# Patient Record
Sex: MALE | Race: WHITE | NOT HISPANIC OR LATINO | Employment: OTHER | URBAN - METROPOLITAN AREA
[De-identification: names, ages, dates, MRNs, and addresses within clinical notes are randomized per-mention and may not be internally consistent; named-entity substitution may affect disease eponyms.]

---

## 2017-02-13 ENCOUNTER — PATIENT MESSAGE (OUTPATIENT)
Dept: RHEUMATOLOGY | Facility: CLINIC | Age: 72
End: 2017-02-13

## 2017-02-15 ENCOUNTER — OFFICE VISIT (OUTPATIENT)
Dept: RHEUMATOLOGY | Facility: CLINIC | Age: 72
End: 2017-02-15
Payer: MEDICARE

## 2017-02-15 VITALS
WEIGHT: 195.38 LBS | DIASTOLIC BLOOD PRESSURE: 82 MMHG | HEIGHT: 65 IN | HEART RATE: 64 BPM | BODY MASS INDEX: 32.55 KG/M2 | SYSTOLIC BLOOD PRESSURE: 137 MMHG

## 2017-02-15 DIAGNOSIS — M62.830 SPASM OF LUMBAR PARASPINOUS MUSCLE: Primary | ICD-10-CM

## 2017-02-15 DIAGNOSIS — Z79.52 ON CORTICOSTEROID THERAPY: ICD-10-CM

## 2017-02-15 DIAGNOSIS — M35.3 POLYMYALGIA RHEUMATICA: ICD-10-CM

## 2017-02-15 PROCEDURE — 3079F DIAST BP 80-89 MM HG: CPT | Mod: S$GLB,,, | Performed by: INTERNAL MEDICINE

## 2017-02-15 PROCEDURE — 3075F SYST BP GE 130 - 139MM HG: CPT | Mod: S$GLB,,, | Performed by: INTERNAL MEDICINE

## 2017-02-15 PROCEDURE — 1157F ADVNC CARE PLAN IN RCRD: CPT | Mod: S$GLB,,, | Performed by: INTERNAL MEDICINE

## 2017-02-15 PROCEDURE — 99999 PR PBB SHADOW E&M-EST. PATIENT-LVL III: CPT | Mod: PBBFAC,,, | Performed by: INTERNAL MEDICINE

## 2017-02-15 PROCEDURE — 99213 OFFICE O/P EST LOW 20 MIN: CPT | Mod: S$GLB,,, | Performed by: INTERNAL MEDICINE

## 2017-02-15 PROCEDURE — 1160F RVW MEDS BY RX/DR IN RCRD: CPT | Mod: S$GLB,,, | Performed by: INTERNAL MEDICINE

## 2017-02-15 PROCEDURE — 1159F MED LIST DOCD IN RCRD: CPT | Mod: S$GLB,,, | Performed by: INTERNAL MEDICINE

## 2017-02-15 PROCEDURE — 1125F AMNT PAIN NOTED PAIN PRSNT: CPT | Mod: S$GLB,,, | Performed by: INTERNAL MEDICINE

## 2017-02-15 RX ORDER — ETODOLAC 400 MG/1
400 TABLET, FILM COATED ORAL 2 TIMES DAILY
Qty: 60 TABLET | Refills: 0 | Status: SHIPPED | OUTPATIENT
Start: 2017-02-15 | End: 2017-04-10

## 2017-02-15 RX ORDER — TIZANIDINE HYDROCHLORIDE 4 MG/1
4 CAPSULE, GELATIN COATED ORAL 2 TIMES DAILY PRN
Qty: 30 CAPSULE | Refills: 1 | Status: SHIPPED | OUTPATIENT
Start: 2017-02-15 | End: 2017-04-10

## 2017-02-15 ASSESSMENT — ROUTINE ASSESSMENT OF PATIENT INDEX DATA (RAPID3)
PAIN SCORE: 8.5
FATIGUE SCORE: 8.5
TOTAL RAPID3 SCORE: 6.72
PSYCHOLOGICAL DISTRESS SCORE: 3.3
AM STIFFNESS SCORE: 1, YES
MDHAQ FUNCTION SCORE: .8
PATIENT GLOBAL ASSESSMENT SCORE: 9

## 2017-02-15 NOTE — MR AVS SNAPSHOT
[facility]  [address]  [facility] [address]  Phone: [phone]  Fax: [phone]                  Sharita Gonzalez   2/15/2017 3:00 PM   Office Visit    Description:  Male : 1945   Provider:  Jd Nguyen MD   Department:  [facility]           Reason for Visit     Disease Management           Diagnoses this Visit        Comments    Spasm of lumbar paraspinous muscle    -  Primary            To Do List           Future Appointments        Provider Department Dept Phone    4/10/2017 8:00 AM Jd Nguyen MD [facility] - Rheumatology [phone]      Goals (5 Years of Data)     None       These Medications        Disp Refills Start End    etodolac (LODINE) 400 MG tablet 60 tablet 0 2/15/2017     Take 1 tablet (400 mg total) by mouth 2 (two) times daily. - Oral    Pharmacy: [facility] [address] [place] Ph #: [phone]       tizanidine 4 mg Cap 30 capsule 1 2/15/2017 3/17/2017    Take 4 mg by mouth 2 (two) times daily as needed (muscle pain). - Oral    Pharmacy: [facility] - [place] [address] [facility] Ph #: [phone]         [facility]     [facility] On Call Nurse Care Line -  Assistance  Registered nurses in the [facility] provide clinical advisement, health education, appointment booking, and other advisory services.  Call for this free service at [phone].             Medications           Message regarding Medications     Verify the changes and/or additions to your medication regime listed below are the same as discussed with your clinician today.  If any of these changes or additions are incorrect, please notify your healthcare provider.        START taking these NEW medications        Refills    etodolac (LODINE) 400 MG tablet 0    Sig: Take 1 tablet (400 mg total) by mouth 2 (two) times daily.    Class: Normal    Route: Oral    tizanidine 4 mg  "Cap 1    Sig: Take 4 mg by mouth 2 (two) times daily as needed (muscle pain).    Class: Normal    Route: Oral           Verify that the below list of medications is an accurate representation of the medications you are currently taking.  If none reported, the list may be blank. If incorrect, please contact your healthcare provider. Carry this list with you in case of emergency.           Current Medications     aspirin 81 mg Tab Take 81 mg by mouth. 1 Tablet Oral Every day    multivitamin (THERAGRAN) per tablet Take by mouth. 1 Tablet Oral Every day    predniSONE (DELTASONE) 5 MG tablet Take 3 tablets (15 mg total) by mouth once daily.    trandolapril (MAVIK) 4 MG Tab     verapamil (CALAN-SR) 240 MG CR tablet Take 240 mg by mouth every evening.    etodolac (LODINE) 400 MG tablet Take 1 tablet (400 mg total) by mouth 2 (two) times daily.    tizanidine 4 mg Cap Take 4 mg by mouth 2 (two) times daily as needed (muscle pain).           Clinical Reference Information           Your Vitals Were     BP Pulse Height Weight BMI    137/82 64 5' 5" (1.651 m) 88.6 kg (195 lb 6.4 oz) 32.52 kg/m2      Blood Pressure          Most Recent Value    BP  137/82      Allergies as of 2/15/2017     Adhesive    Latex, Natural Rubber      Immunizations Administered on Date of Encounter - 2/15/2017     None      Instructions    Take anti-inflammatory (etodolac) and anti-spasm (tizanidine) twice daily  Use heat and stretch gently  Call if not better by next week  Consider additional physical therapy       Smoking Cessation     If you would like to quit smoking:   You may be eligible for free services if you are a [place] resident and started smoking cigarettes before September 1, 1988.  Call the Smoking Cessation Trust (SCT) toll free at (490) 248-6515 or ([phone] 149-5588.   Call 1-921-QUIT-NOW if you do not meet the above criteria.            Language Assistance Services     ATTENTION: Language assistance services are available, free of " charge. Please call [phone].      ATENCIÓN: Si habla español, tiene a pavon disposición servicios gratuitos de asistencia lingüística. Llame al [phone].     CHÚ Ý: N?u b?n nói Ti?ng Vi?t, có các d?ch v? h? tr? ngôn ng? mi?n phí dành cho b?n. G?i s? [phone].         [facility] complies with applicable Federal civil rights laws and does not discriminate on the basis of race, color, national origin, age, disability, or sex.

## 2017-02-15 NOTE — PATIENT INSTRUCTIONS
Take anti-inflammatory (etodolac) and anti-spasm (tizanidine) twice daily  Use heat and stretch gently  Call if not better by next week  Consider additional physical therapy

## 2017-02-15 NOTE — PROGRESS NOTES
"Subjective:       Patient ID: Sharita Gonzalez is a 71 y.o. male.    Chief Complaint: Disease Management    HPI   He is seen urgently because of left lower back pain  Has a history of polymyalgia rheumatica controlled with prednisone 50 mg daily.  For the past few weeks she's had progressive pain in the lumbar spine and left lateral flank that has been disabling at times.  He thought he might have a kidney stone but has had no change in urination and nothing his past.  It is somewhat positional when he is up standing walking or twisting.  It does not bother him at night when he lies down to sleep but it's present when he gets up in the morning    Review of Systems    He denies any change in bowel or bladder habits.  No paresthesias  Objective:     Visit Vitals    /82    Pulse 64    Ht 5' 5" (1.651 m)    Wt 88.6 kg (195 lb 6.4 oz)    BMI 32.52 kg/m2        Physical Exam    Localizes pain to the lumbar spine and left flank.  There is no direct tenderness but there is pain when he actively leans or twists his spine.  Straight leg raise test is negative.  Hip range of motion is painless.  Normal muscle tone.  Assessment:       1. Spasm of lumbar paraspinous muscle        Left lumbar and flank pain that does not look like PMR   PMR that is probably stable on 15 mg prednisone  Plan:     1.  Etodolac 400 twice a day plus tizanidine 4 mg twice a day  2.  Heat and stretch  3.  Call me with a progress report.  If no better then physical therapy  4.  Continue prednisone 15 mg daily for PMR  5.  Keep March appointment        "

## 2017-03-06 DIAGNOSIS — M35.3 POLYMYALGIA RHEUMATICA: ICD-10-CM

## 2017-03-06 RX ORDER — PREDNISONE 5 MG/1
15 TABLET ORAL DAILY
Qty: 90 TABLET | Refills: 2 | Status: SHIPPED | OUTPATIENT
Start: 2017-03-06 | End: 2017-04-10 | Stop reason: SDUPTHER

## 2017-03-06 RX ORDER — PREDNISONE 5 MG/1
TABLET ORAL
Qty: 90 TABLET | Refills: 2 | Status: SHIPPED | OUTPATIENT
Start: 2017-03-06 | End: 2017-03-06 | Stop reason: SDUPTHER

## 2017-04-10 ENCOUNTER — HOSPITAL ENCOUNTER (OUTPATIENT)
Dept: RADIOLOGY | Facility: HOSPITAL | Age: 72
Discharge: HOME | End: 2017-04-10
Attending: INTERNAL MEDICINE
Payer: MEDICARE

## 2017-04-10 ENCOUNTER — OFFICE VISIT (OUTPATIENT)
Dept: RHEUMATOLOGY | Facility: CLINIC | Age: 72
End: 2017-04-10
Payer: MEDICARE

## 2017-04-10 VITALS
BODY MASS INDEX: 31.65 KG/M2 | WEIGHT: 190 LBS | DIASTOLIC BLOOD PRESSURE: 80 MMHG | HEIGHT: 65 IN | HEART RATE: 64 BPM | SYSTOLIC BLOOD PRESSURE: 131 MMHG

## 2017-04-10 DIAGNOSIS — M25.512 ACUTE PAIN OF LEFT SHOULDER: ICD-10-CM

## 2017-04-10 DIAGNOSIS — M79.661 RIGHT CALF PAIN: ICD-10-CM

## 2017-04-10 DIAGNOSIS — M35.3 POLYMYALGIA RHEUMATICA: Primary | ICD-10-CM

## 2017-04-10 DIAGNOSIS — Z79.52 ON CORTICOSTEROID THERAPY: ICD-10-CM

## 2017-04-10 PROCEDURE — 73030 X-RAY EXAM OF SHOULDER: CPT | Mod: 26,LT,, | Performed by: RADIOLOGY

## 2017-04-10 PROCEDURE — 99999 PR PBB SHADOW E&M-EST. PATIENT-LVL III: CPT | Mod: PBBFAC,,, | Performed by: INTERNAL MEDICINE

## 2017-04-10 PROCEDURE — 20610 DRAIN/INJ JOINT/BURSA W/O US: CPT | Mod: LT,S$GLB,, | Performed by: INTERNAL MEDICINE

## 2017-04-10 PROCEDURE — 3075F SYST BP GE 130 - 139MM HG: CPT | Mod: S$GLB,,, | Performed by: INTERNAL MEDICINE

## 2017-04-10 PROCEDURE — 73030 X-RAY EXAM OF SHOULDER: CPT | Mod: TC,LT

## 2017-04-10 PROCEDURE — 3079F DIAST BP 80-89 MM HG: CPT | Mod: S$GLB,,, | Performed by: INTERNAL MEDICINE

## 2017-04-10 PROCEDURE — 99213 OFFICE O/P EST LOW 20 MIN: CPT | Mod: 25,S$GLB,, | Performed by: INTERNAL MEDICINE

## 2017-04-10 PROCEDURE — 1160F RVW MEDS BY RX/DR IN RCRD: CPT | Mod: S$GLB,,, | Performed by: INTERNAL MEDICINE

## 2017-04-10 PROCEDURE — 1159F MED LIST DOCD IN RCRD: CPT | Mod: S$GLB,,, | Performed by: INTERNAL MEDICINE

## 2017-04-10 PROCEDURE — 1125F AMNT PAIN NOTED PAIN PRSNT: CPT | Mod: S$GLB,,, | Performed by: INTERNAL MEDICINE

## 2017-04-10 PROCEDURE — 1157F ADVNC CARE PLAN IN RCRD: CPT | Mod: S$GLB,,, | Performed by: INTERNAL MEDICINE

## 2017-04-10 RX ORDER — PREDNISONE 1 MG/1
4 TABLET ORAL DAILY
Qty: 120 TABLET | Refills: 2 | Status: SHIPPED | OUTPATIENT
Start: 2017-04-10 | End: 2017-05-10

## 2017-04-10 RX ORDER — TRIAMCINOLONE ACETONIDE 40 MG/ML
40 INJECTION, SUSPENSION INTRA-ARTICULAR; INTRAMUSCULAR
Status: DISCONTINUED | OUTPATIENT
Start: 2017-04-10 | End: 2017-04-10 | Stop reason: HOSPADM

## 2017-04-10 RX ORDER — PREDNISONE 5 MG/1
10 TABLET ORAL DAILY
Qty: 90 TABLET | Refills: 2
Start: 2017-04-10 | End: 2017-10-06

## 2017-04-10 RX ADMIN — TRIAMCINOLONE ACETONIDE 40 MG: 40 INJECTION, SUSPENSION INTRA-ARTICULAR; INTRAMUSCULAR at 10:04

## 2017-04-10 ASSESSMENT — ROUTINE ASSESSMENT OF PATIENT INDEX DATA (RAPID3)
PAIN SCORE: 2.5
FATIGUE SCORE: 2
MDHAQ FUNCTION SCORE: .3
PATIENT GLOBAL ASSESSMENT SCORE: 2
TOTAL RAPID3 SCORE: 1.83

## 2017-04-10 NOTE — PROCEDURES
Large Joint Aspiration/Injection  Date/Time: 4/10/2017 10:32 AM  Performed by: ALICIA DUBOSE  Authorized by: ALICIA DUBSOE     Consent Done?:  Yes (Verbal)  Indications:  Pain    Location:  Shoulder  Site:  L subacromial bursa  Prep: Patient was prepped and draped in usual sterile fashion    Ultrasonic Guidance for needle placement: No  Needle size:  22 G  Approach:  Lateral  Medications:  40 mg triamcinolone acetonide 40 mg/mL  Aspirate amount (ml):  0  Patient tolerance:  Patient tolerated the procedure well with no immediate complications

## 2017-04-10 NOTE — MR AVS SNAPSHOT
[facility]  [address]  [facility] [address]  Phone: [phone]  Fax: [phone]                  Sharita Gonzalez   4/10/2017 8:00 AM   Office Visit    Description:  Male : 1945   Provider:  Jd Nguyen MD   Department:  [facility]           Reason for Visit     Follow-up           Diagnoses this Visit        Comments    Polymyalgia rheumatica    -  Primary     Acute pain of left shoulder         On corticosteroid therapy         Right calf pain                To Do List           Future Appointments        Provider Department Dept Phone    4/10/2017  9:30 AM [facility]TH[facility] [address] [facility] [phone]      Goals (5 Years of Data)     None       These Medications        Disp Refills Start End    predniSONE (DELTASONE) 1 MG tablet 120 tablet 2 4/10/2017 5/10/2017    Take 4 tablets (4 mg total) by mouth once daily. - Oral    Pharmacy: [facility] - [place] - [address] [facility] Ph #: [phone]       predniSONE (DELTASONE) 5 MG tablet 90 tablet 2 4/10/2017     Take 2 tablets (10 mg total) by mouth once daily. - Oral    Pharmacy: [facility] - [place] Ph #: [phone]         [facility]     [facility] Nurse Care Line - 24/ Assistance  Unless otherwise directed by your provider, please contact [facility], our nurse care line that is available for / assistance.     Registered nurses in the [facility] provide: appointment scheduling, clinical advisement, health education, and other advisory services.  Call: [phone] (toll free)               Medications           Message regarding Medications     Verify the changes and/or additions to your medication regime listed below are the same as discussed with your clinician today.  If any of these changes or additions are incorrect, please notify your healthcare provider.       "  START taking these NEW medications        Refills    predniSONE (DELTASONE) 1 MG tablet 2    Sig: Take 4 tablets (4 mg total) by mouth once daily.    Class: Normal    Route: Oral      CHANGE how you are taking these medications     Start Taking Instead of    predniSONE (DELTASONE) 5 MG tablet predniSONE (DELTASONE) 5 MG tablet    Dosage:  Take 2 tablets (10 mg total) by mouth once daily. Dosage:  Take 3 tablets (15 mg total) by mouth once daily.    Reason for Change:  Reorder       STOP taking these medications     etodolac (LODINE) 400 MG tablet Take 1 tablet (400 mg total) by mouth 2 (two) times daily.    tizanidine 4 mg Cap Take 4 mg by mouth 2 (two) times daily as needed (muscle pain).           Verify that the below list of medications is an accurate representation of the medications you are currently taking.  If none reported, the list may be blank. If incorrect, please contact your healthcare provider. Carry this list with you in case of emergency.           Current Medications     aspirin 81 mg Tab Take 81 mg by mouth. 1 Tablet Oral Every day    multivitamin (THERAGRAN) per tablet Take by mouth. 1 Tablet Oral Every day    predniSONE (DELTASONE) 5 MG tablet Take 2 tablets (10 mg total) by mouth once daily.    trandolapril (MAVIK) 4 MG Tab     verapamil (CALAN-SR) 240 MG CR tablet Take 240 mg by mouth every evening.    predniSONE (DELTASONE) 1 MG tablet Take 4 tablets (4 mg total) by mouth once daily.           Clinical Reference Information           Your Vitals Were     BP Pulse Height Weight BMI    131/80 (BP Location: Left arm, Patient Position: Sitting, BP Method: Automatic) 64 5' 5" (1.651 m) 86.2 kg (190 lb) 31.62 kg/m2      Blood Pressure          Most Recent Value    BP  131/80      Allergies as of 4/10/2017     Adhesive    Latex, Natural Rubber      Immunizations Administered on Date of Encounter - 4/10/2017     None      Orders Placed During Today's Visit     Future Labs/Procedures Expected by " Expires    X-Ray Shoulder 2 or More Views Left  4/10/2017 4/10/2018    Recurring Lab Work Interval Expires    C-reactive protein  Every 12 Weeks until 4/11/2019 4/11/2019    Sedimentation rate, manual  Every 12 Weeks until 6/9/2018 6/9/2018      Instructions    Lab in June and August and return in September       Smoking Cessation     If you would like to quit smoking:   You may be eligible for free services if you are a [place] resident and started smoking cigarettes before September 1, 1988.  Call the Smoking Cessation Trust ([facility]) toll free at (405) 740-7657 or (573) 962-5224.   Call 4-889-QUIT-NOW if you do not meet the above criteria.   Contact us via email: [email or web address]   View our website for more information: [email or web address]        Language Assistance Services     ATTENTION: Language assistance services are available, free of charge. Please call [phone].      ATENCIÓN: Si habla español, tiene a pavon disposición servicios gratuitos de asistencia lingüística. Llame al [phone].     CHÚ Ý: N?u b?n nói Ti?ng Vi?t, có các d?ch v? h? tr? ngôn ng? mi?n phí dành cho b?n. G?i s? [phone].         [facility] complies with applicable Federal civil rights laws and does not discriminate on the basis of race, color, national origin, age, disability, or sex.

## 2017-04-10 NOTE — PROGRESS NOTES
"Subjective:       Patient ID: Sharita Gonzalez is a 71 y.o. male.    Chief Complaint: Follow-up    HPI     Follow-up polymyalgia rheumatica  History of PMR 2012 that went into remission. Symptoms reoccurred 2015 and pred restarted  Currently Pred 15 mg/d    L shoulder pain gamaliel. Elevating arm; awoke with pain one morning about 3 weeks ago; no antecedent trauma    R calf pain; swollen for 2 weeks; no long bus trips or plane rides; "feels like a kimberly horse  Usually walks a lot  Fishes a lot    Notes a lot of swelling since on prednisone    He notes that his blood pressure is doing well   Had lab at [facility] and all was well  Had screening colonoscopy    No giant cell arteritis symptoms including headache, jaw pain, or diplopia or visual disturbance    Review of Systems   Constitutional: Negative for fatigue, fever and unexpected weight change.   HENT: Negative for mouth sores and trouble swallowing.    Eyes: Negative for redness.   Respiratory: Negative for cough and shortness of breath.    Cardiovascular: Negative for chest pain.   Gastrointestinal: Negative for abdominal pain, constipation and diarrhea.   Genitourinary: Negative for dysuria and genital sores.   Skin: Negative for rash.   Neurological: Negative for headaches.   Hematological: Does not bruise/bleed easily.   Psychiatric/Behavioral: Negative for sleep disturbance.         Objective:     /80 (BP Location: Left arm, Patient Position: Sitting, BP Method: Automatic)  Pulse 64  Ht 5' 5" (1.651 m)  Wt 86.2 kg (190 lb)  BMI 31.62 kg/m2     Physical Exam      UE nl except L shoulder; sl decreased ER and some pain on resisted abduction with supraspinatus sign  LE normal with sl L calf tenderness but no cord, no swelling, no pain on walking on heels or toes       Assessment:       1. Acute pain of left shoulder        PMR controlled ; needs to taper prednisone  Weight gain and edema on prednisone  L shoulder pain c/w supraspinatus tendinitis  R calf " tenderness of uncertain significance  No symptoms of giant cell arteritis  Plan:     1. Xray L shoulder and return   2.  prednisone taper by 1 mg every 2-4 weeks  3. Repeat esr/crp  and Aug and RTC me Sept      later:  X-ray normal with minimal degenerative changes  Impression left shoulder supraspinatus tendinitis  Recommendation: Inject subacromial bursa with 40 mg of Kenalog  Rapid3 Question Responses and Scores 2017   Dress yourself, including tying shoelaces and doing buttons? With some difficulty   Get in and out of bed? Without any difficulty   Lift a full cup or glass to your mouth? Without any difficulty   Walk outdoors on flat ground? Without any difficulty   Wash and dry your entire body? Without any difficulty   Bend down to  clothing from the floor? With some difficulty   Turn regular faucets on and off? Without any difficulty   Get in and out of a car, bus, train, or airplane? Without any difficulty   Walk two miles or three kilometers, if you wish? With some difficulty   Participate in recreational activities and sports Without any difficulty   Get a good nights sleep? With some difficulty   Deal with feelings of anxiety or being nervous? Without any difficulty   Deal with feelings of depression or feeling blue? Without any difficulty   How much pain have you had because of your condition over the past week?  2.5   When you awakened in the morning OVER THE LAST WEEK, did you feel stiff? No   If Yes, please indicate the number of hours until you are as limber as you will be for the day. 1   How much of a problem has UNUSUAL fatigue or tiredness been for you OVER THE PAST WEEK? 2.0   Considering all the ways in which illness and health conditions may effect you at this time, please indication below how you are doin.0   MHAQ Score 0.3   Rapid 3 Functional Status (FN) 1   RAPID 3 Pain Score 5   RAPID 3 Global Health Score 4   Rapid 3 Total Score 6 (Low Severity)   Rapid 3 Final Score  2       Answers for HPI/ROS submitted by the patient on 4/8/2017   swollen glands: No  heartburn: No  tingling: No

## 2017-06-08 ENCOUNTER — LAB VISIT (OUTPATIENT)
Dept: LAB | Facility: HOSPITAL | Age: 72
End: 2017-06-08
Attending: INTERNAL MEDICINE
Payer: MEDICARE

## 2017-06-08 DIAGNOSIS — M35.3 POLYMYALGIA RHEUMATICA: ICD-10-CM

## 2017-06-08 LAB
CRP SERPL-MCNC: 2 MG/L
ERYTHROCYTE [SEDIMENTATION RATE] IN BLOOD BY WESTERGREN METHOD: 16 MM/HR

## 2017-06-08 PROCEDURE — 86140 C-REACTIVE PROTEIN: CPT

## 2017-06-08 PROCEDURE — 85651 RBC SED RATE NONAUTOMATED: CPT

## 2017-06-08 PROCEDURE — 36415 COLL VENOUS BLD VENIPUNCTURE: CPT | Mod: PO

## 2017-06-26 ENCOUNTER — HOSPITAL ENCOUNTER (OUTPATIENT)
Dept: RADIOLOGY | Facility: OTHER | Age: 72
Discharge: HOME | End: 2017-06-26
Attending: ORTHOPAEDIC SURGERY
Payer: MEDICARE

## 2017-06-26 ENCOUNTER — OFFICE VISIT (OUTPATIENT)
Dept: ORTHOPEDICS | Facility: CLINIC | Age: 72
End: 2017-06-26
Payer: MEDICARE

## 2017-06-26 VITALS
RESPIRATION RATE: 18 BRPM | HEART RATE: 72 BPM | SYSTOLIC BLOOD PRESSURE: 112 MMHG | DIASTOLIC BLOOD PRESSURE: 59 MMHG | HEIGHT: 65 IN | BODY MASS INDEX: 31.65 KG/M2 | WEIGHT: 190 LBS

## 2017-06-26 DIAGNOSIS — S46.111A STRAIN OF MUSCLE, FASCIA AND TENDON OF LONG HEAD OF BICEPS, RIGHT ARM, INITIAL ENCOUNTER: ICD-10-CM

## 2017-06-26 DIAGNOSIS — S49.91XA: ICD-10-CM

## 2017-06-26 DIAGNOSIS — S46.111A STRAIN OF MUSCLE, FASCIA AND TENDON OF LONG HEAD OF BICEPS, RIGHT ARM, INITIAL ENCOUNTER: Primary | ICD-10-CM

## 2017-06-26 PROCEDURE — 1159F MED LIST DOCD IN RCRD: CPT | Mod: S$GLB,,, | Performed by: PHYSICIAN ASSISTANT

## 2017-06-26 PROCEDURE — 73080 X-RAY EXAM OF ELBOW: CPT | Mod: 26,RT,, | Performed by: RADIOLOGY

## 2017-06-26 PROCEDURE — 99999 PR PBB SHADOW E&M-EST. PATIENT-LVL IV: CPT | Mod: PBBFAC,,, | Performed by: PHYSICIAN ASSISTANT

## 2017-06-26 PROCEDURE — 73060 X-RAY EXAM OF HUMERUS: CPT | Mod: 26,RT,, | Performed by: RADIOLOGY

## 2017-06-26 PROCEDURE — 73060 X-RAY EXAM OF HUMERUS: CPT | Mod: TC,RT

## 2017-06-26 PROCEDURE — 1126F AMNT PAIN NOTED NONE PRSNT: CPT | Mod: S$GLB,,, | Performed by: PHYSICIAN ASSISTANT

## 2017-06-26 PROCEDURE — 73080 X-RAY EXAM OF ELBOW: CPT | Mod: TC,RT

## 2017-06-26 PROCEDURE — 73030 X-RAY EXAM OF SHOULDER: CPT | Mod: 26,RT,, | Performed by: RADIOLOGY

## 2017-06-26 PROCEDURE — 99214 OFFICE O/P EST MOD 30 MIN: CPT | Mod: S$GLB,,, | Performed by: PHYSICIAN ASSISTANT

## 2017-06-26 PROCEDURE — 73030 X-RAY EXAM OF SHOULDER: CPT | Mod: TC,RT

## 2017-06-26 NOTE — PROGRESS NOTES
"Subjective:      Patient ID: Sharita Gonzalez is a 71 y.o. male.    Chief Complaint: Pain of the Left Shoulder      HPI  Sharita Gonzalez is a right hand dominant 71 y.o. male presenting today for evaluation of right upper arm pain.  There was a history of trauma- 6 weeks ago he was carrying a cooler in his right hand, lost is footing and "felt a lurch" of the right arm.  He says that the injury was similar to his injury 5 years ago when he tore his biceps tendon.  He has since been experiencing "discomfort and weakness" when he performs repetitive activities, such as fishing.  He says that some days he even has problems holding a coffee cup in the right hand due to the weakness.  He is unable to fish for more than a few hours due to arm fatigue/weakness and the discomfort.  He says that the discomfort "goes from the shoulder to the elbow."  He currently has no pain (0/10), but on occasion the discomfort ranges from mild to moderate.  It is alleviated by rest.  He is status post right proximal biceps tendon tenodesis, right shoulder mass excision, and PRP injection by Dr. Simpson in 2012.  He is also being treated for his left shoulder pain in Rheumatology, he had a kenalog injection in 4/2017.      Review of patient's allergies indicates:   Allergen Reactions    Adhesive Rash    Latex, natural rubber Rash         Current Outpatient Prescriptions   Medication Sig Dispense Refill    aspirin 81 mg Tab Take 81 mg by mouth. 1 Tablet Oral Every day      multivitamin (THERAGRAN) per tablet Take by mouth. 1 Tablet Oral Every day      predniSONE (DELTASONE) 5 MG tablet Take 2 tablets (10 mg total) by mouth once daily. 90 tablet 2    trandolapril (MAVIK) 4 MG Tab       verapamil (CALAN-SR) 240 MG CR tablet Take 240 mg by mouth every evening.       No current facility-administered medications for this visit.        Past Medical History:   Diagnosis Date    Hypertension     Polymyalgia     Prostate cancer        Past " "Surgical History:   Procedure Laterality Date    BRAIN SURGERY  age 27    subarrachnoid brain hemorhage repair    CATARACT EXTRACTION      FINGER SURGERY  9-17-13    left TFR    HEMORRHOID SURGERY      SHOULDER SURGERY           Review of Systems:  Review of Systems   Constitution: Negative for chills and fever.   HENT: Negative for headaches.    Skin: Negative for rash and suspicious lesions.   Musculoskeletal:        See HPI   Neurological: Negative for dizziness, light-headedness, numbness and paresthesias.   Psychiatric/Behavioral: Negative for depression. The patient is not nervous/anxious.          OBJECTIVE:     PHYSICAL EXAM:  Height: 5' 5" (165.1 cm) Weight: 86.2 kg (190 lb)     Vitals:    06/26/17 1329   BP: (!) 112/59   Pulse: 72   Resp: 18     General    Vitals reviewed.  Constitutional: He is oriented to person, place, and time. He appears well-developed and well-nourished.   HENT:   Head: Normocephalic and atraumatic.   Neck: Normal range of motion.   Cardiovascular: Normal rate.    Pulmonary/Chest: Effort normal. No respiratory distress.   Neurological: He is alert and oriented to person, place, and time.   Psychiatric: He has a normal mood and affect. His behavior is normal. Judgment and thought content normal.           Musculoskeletal: Mild edema of medial aspect of distal right upper arm. Scars noted on the right upper arm and shoulder. No ecchymosis. Negative prince's sign, negative reverse prince's sign.  Normal Hook's test. Tender to palpation over the biceps; tenderness noted both proximally and distally, but more significant proximally. Slight decrease in strength of right upper arm with flexion, supination, pronation against resistance. Full symmetrical ROM of shoulders, elbows, wrists, fingers.      RADIOGRAPHS:  Right Elbow X-Ray, 6/26/17  Findings: AP, lateral and oblique radiographs of the right elbow demonstrate a tiny enthesophyte at the lateral epicondyles.  Otherwise, the " osseous structures, soft tissues and joint spaces are normal.   Impression    Small lateral epicondyle enthesophyte otherwise normal exam.     Right Humerus X-Ray, 6/26/17  Findings: AP and lateral radiographs of the right humerus as well as Y-view, internal and external rotation radiographs of the right shoulder demonstrate a lucent lesion in the surgical neck of the right humerus which corresponds to previous postsurgical change as evidenced on the prior MRI.  Otherwise, osseous structures, soft tissues and joint spaces are significant for osteophyte formation at the acromioclavicular joint.  The visualized portions of the right chest are normal.   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  Otherwise normal evaluation of the right shoulder and humerus.     Right Shoulder X-Ray, 6/26/17  Findings: AP and lateral radiographs of the right humerus as well as Y-view, internal and external rotation radiographs of the right shoulder demonstrate a lucent lesion in the surgical neck of the right humerus which corresponds to previous postsurgical change as evidenced on the prior MRI.  Otherwise, osseous structures, soft tissues and joint spaces are significant for osteophyte formation at the acromioclavicular joint.  The visualized portions of the right chest are normal.   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  Otherwise normal evaluation of the right shoulder and humerus.       Comments: I have personally reviewed the imaging and I agree with the above radiologist's report.    ASSESSMENT/PLAN:   Sharita was seen today for pain.    Diagnoses and all orders for this visit:    Strain of muscle, fascia and tendon of long head of biceps, right arm, initial encounter  -     MRI Upper Extremity Joint Without Contrast Right; Future  -     Basic metabolic panel; Future    Injury of arm, right, initial encounter           - We  talked at length about the anatomy and pathophysiology of   Encounter Diagnoses   Name Primary?    Strain of muscle, fascia and tendon of long head of biceps, right arm, initial encounter Yes    Injury of arm, right, initial encounter        - Right shoulder, humerus, and elbow X-Ray  - Right upper extremity MRI   - BMP for MRI  - Therapy was discussed - will wait for MRI results  - Recommended Medications: NSAIDs for pain/discomfort  - Follow up after MRI.

## 2017-07-07 ENCOUNTER — TELEPHONE (OUTPATIENT)
Dept: ORTHOPEDICS | Facility: CLINIC | Age: 72
End: 2017-07-07

## 2017-07-07 NOTE — TELEPHONE ENCOUNTER
----- Message from Enrique Flores sent at 7/7/2017 11:02 AM CDT -----  Contact: Ellen with [facility]  _ 1st Request   _ 2nd Request   _ 3rd Request     Who: POONAM SHARPE [[id number]]    Why: Ellen is requesting a call back in reference to the orders for MRI    What Number to Call Back: [phone]    When to Expect a call back: (Before the end of the day)   -- if call after 3:00 call back will be tomorrow.

## 2017-07-11 ENCOUNTER — HOSPITAL ENCOUNTER (OUTPATIENT)
Dept: RADIOLOGY | Facility: HOSPITAL | Age: 72
Discharge: HOME | End: 2017-07-11
Attending: ORTHOPAEDIC SURGERY
Payer: MEDICARE

## 2017-07-11 DIAGNOSIS — S46.111A STRAIN OF MUSCLE, FASCIA AND TENDON OF LONG HEAD OF BICEPS, RIGHT ARM, INITIAL ENCOUNTER: ICD-10-CM

## 2017-07-11 PROCEDURE — 73218 MRI UPPER EXTREMITY W/O DYE: CPT | Mod: 26,RT,, | Performed by: RADIOLOGY

## 2017-07-11 PROCEDURE — 73218 MRI UPPER EXTREMITY W/O DYE: CPT | Mod: TC,RT

## 2017-07-13 ENCOUNTER — PATIENT MESSAGE (OUTPATIENT)
Dept: ORTHOPEDICS | Facility: CLINIC | Age: 72
End: 2017-07-13

## 2017-07-19 ENCOUNTER — TELEPHONE (OUTPATIENT)
Dept: ORTHOPEDICS | Facility: CLINIC | Age: 72
End: 2017-07-19

## 2017-07-19 NOTE — TELEPHONE ENCOUNTER
----- Message from CARMELITA Hendricks sent at 7/19/2017  1:20 PM CDT -----  Regarding: Follow up appt  I noticed today that he wasn't able to make his appt with Dr. Cutler to discuss his MRI results.  I had purposefully put him on Dr. Cutler's schedule to discuss the results and his pain. The phone room scheduled him to see me about his shoulder (which I assume is the same issue not a new one).  He needs to be on Dr. Cutler's schedule to discuss his MRI and the pain in his upper arm/shoulder, the appointment with me alone would be a wasted appointment.  Please call him to reschedule.    Thank you,  Laila

## 2017-07-25 ENCOUNTER — OFFICE VISIT (OUTPATIENT)
Dept: ORTHOPEDICS | Facility: CLINIC | Age: 72
End: 2017-07-25
Payer: MEDICARE

## 2017-07-25 VITALS
RESPIRATION RATE: 18 BRPM | HEIGHT: 65 IN | HEART RATE: 65 BPM | SYSTOLIC BLOOD PRESSURE: 116 MMHG | BODY MASS INDEX: 31.65 KG/M2 | DIASTOLIC BLOOD PRESSURE: 73 MMHG | WEIGHT: 190 LBS

## 2017-07-25 DIAGNOSIS — S46.111A STRAIN OF MUSCLE, FASCIA AND TENDON OF LONG HEAD OF BICEPS, RIGHT ARM, INITIAL ENCOUNTER: Primary | ICD-10-CM

## 2017-07-25 PROCEDURE — 1126F AMNT PAIN NOTED NONE PRSNT: CPT | Mod: S$GLB,,, | Performed by: ORTHOPAEDIC SURGERY

## 2017-07-25 PROCEDURE — 99212 OFFICE O/P EST SF 10 MIN: CPT | Mod: S$GLB,,, | Performed by: ORTHOPAEDIC SURGERY

## 2017-07-25 PROCEDURE — 1159F MED LIST DOCD IN RCRD: CPT | Mod: S$GLB,,, | Performed by: ORTHOPAEDIC SURGERY

## 2017-07-25 PROCEDURE — 99999 PR PBB SHADOW E&M-EST. PATIENT-LVL III: CPT | Mod: PBBFAC,,, | Performed by: ORTHOPAEDIC SURGERY

## 2017-07-25 PROCEDURE — 3008F BODY MASS INDEX DOCD: CPT | Mod: S$GLB,,, | Performed by: ORTHOPAEDIC SURGERY

## 2017-07-25 NOTE — PROGRESS NOTES
I have personally taken the history and examined the patient. I agree with the Hand Surgery PA's note. The plan will be PT. MRI reviewed with pt. Pt TTP over biceps muscle belly but only mild pain. Pt will start PT fro biceps strain

## 2017-07-25 NOTE — PROGRESS NOTES
"Subjective:      Patient ID: Sharita Gonzalez is a 71 y.o. male.    Chief Complaint: Pain of the Right Shoulder      HPI  Sharita Gonzalez is a right hand dominant 71 y.o. male presenting today for follow up of right upper arm pain and review of his recent MRI.  There was a history of trauma- 10 weeks ago he was carrying a cooler in his right hand, lost is footing and "felt a lurch" of the right arm- patient reports the injury was similar to his injury 5 years ago when he tore his biceps tendon.  He continues to have pain after 2-3 hours of fishing.  He says that he has no pain today due to resting that arm for the past 4 days.    He is status post right proximal biceps tendon tenodesis, right shoulder mass excision, and PRP injection by Dr. Simpson in 2012.  He is also being treated for his left shoulder pain in Rheumatology, he had a kenalog injection in 4/2017.  He is also on oral Prednisone for polymyalgia for the past 1-1.5 years, being followed by Dr. Nguyen.      Review of patient's allergies indicates:   Allergen Reactions    Adhesive Rash    Latex, natural rubber Rash         Current Outpatient Prescriptions   Medication Sig Dispense Refill    aspirin 81 mg Tab Take 81 mg by mouth. 1 Tablet Oral Every day      multivitamin (THERAGRAN) per tablet Take by mouth. 1 Tablet Oral Every day      predniSONE (DELTASONE) 5 MG tablet Take 2 tablets (10 mg total) by mouth once daily. 90 tablet 2    trandolapril (MAVIK) 4 MG Tab       verapamil (CALAN-SR) 240 MG CR tablet Take 240 mg by mouth every evening.       No current facility-administered medications for this visit.        Past Medical History:   Diagnosis Date    Hypertension     Polymyalgia     Prostate cancer        Past Surgical History:   Procedure Laterality Date    BRAIN SURGERY  age 27    subarrachnoid brain hemorhage repair    CATARACT EXTRACTION      FINGER SURGERY  9-17-13    left TFR    HEMORRHOID SURGERY      SHOULDER SURGERY   " "        Review of Systems:  Review of Systems   Constitution: Negative for chills and fever.   HENT: Negative for headaches.    Skin: Negative for rash and suspicious lesions.   Musculoskeletal:        See HPI   Neurological: Negative for dizziness, light-headedness, numbness and paresthesias.   Psychiatric/Behavioral: Negative for depression. The patient is not nervous/anxious.          OBJECTIVE:     PHYSICAL EXAM:  Height: 5' 5" (165.1 cm) Weight: 86.2 kg (190 lb)     Vitals:    07/25/17 1322   BP: 116/73   Pulse: 65   Resp: 18     General    Vitals reviewed.  Constitutional: He is oriented to person, place, and time. He appears well-developed and well-nourished.   HENT:   Head: Normocephalic and atraumatic.   Neck: Normal range of motion.   Cardiovascular: Normal rate.    Pulmonary/Chest: Effort normal. No respiratory distress.   Neurological: He is alert and oriented to person, place, and time.   Psychiatric: He has a normal mood and affect. His behavior is normal. Judgment and thought content normal.           Musculoskeletal: Mild edema of medial aspect of distal right upper arm. Scars noted on the right upper arm and shoulder. No ecchymosis.  Mildly tender to palpation over the biceps.. Slight decrease in strength of right upper arm with flexion, supination, pronation against resistance. Full symmetrical ROM of shoulders, elbows, wrists, fingers.  Negative prince's sign, negative reverse prince's sign.  Normal Hook's test.      RADIOGRAPHS:  Right Upper Extremity MRI, 7/11/17:  Findings: Status post biceps tenodesis.  Proximal biceps tendon is not well evaluated on large field-of-view examination.  Biceps muscle is unremarkable.  No evidence for strain or myotendinous injury.  The distal biceps tendon is intact to the level of the elbow joint.  Insertional tendon is not evaluated.  No fluid collections or masses.  Neurovascular structures are unremarkable.  Bone marrow signal is maintained.   Impression "    Status post biceps tenodesis.  No evidence for biceps muscle injury.  Distal insertion not visualized on MRI of humerus.         Right Elbow X-Ray, 6/26/17  Findings: AP, lateral and oblique radiographs of the right elbow demonstrate a tiny enthesophyte at the lateral epicondyles.  Otherwise, the osseous structures, soft tissues and joint spaces are normal.   Impression    Small lateral epicondyle enthesophyte otherwise normal exam.     Right Humerus X-Ray, 6/26/17  Findings: AP and lateral radiographs of the right humerus as well as Y-view, internal and external rotation radiographs of the right shoulder demonstrate a lucent lesion in the surgical neck of the right humerus which corresponds to previous postsurgical change as evidenced on the prior MRI.  Otherwise, osseous structures, soft tissues and joint spaces are significant for osteophyte formation at the acromioclavicular joint.  The visualized portions of the right chest are normal.   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  Otherwise normal evaluation of the right shoulder and humerus.     Right Shoulder X-Ray, 6/26/17  Findings: AP and lateral radiographs of the right humerus as well as Y-view, internal and external rotation radiographs of the right shoulder demonstrate a lucent lesion in the surgical neck of the right humerus which corresponds to previous postsurgical change as evidenced on the prior MRI.  Otherwise, osseous structures, soft tissues and joint spaces are significant for osteophyte formation at the acromioclavicular joint.  The visualized portions of the right chest are normal.   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  Otherwise normal evaluation of the right shoulder and humerus.           ASSESSMENT/PLAN:   Sharita was seen today for pain.    Diagnoses and all orders for this visit:    Strain of muscle, fascia and tendon of long head  of biceps, right arm, initial encounter  -     Ambulatory Referral to Physical/Occupational Therapy           - We talked at length about the anatomy and pathophysiology of   Encounter Diagnosis   Name Primary?    Strain of muscle, fascia and tendon of long head of biceps, right arm, initial encounter Yes       - reviewed MRI results with the patient  - Therapy orders placed  - Recommended Medications: NSAIDs for pain/discomfort  - Follow up after completion of PT if symptoms have not resolved

## 2017-08-08 ENCOUNTER — LAB VISIT (OUTPATIENT)
Dept: LAB | Facility: HOSPITAL | Age: 72
End: 2017-08-08
Attending: INTERNAL MEDICINE
Payer: MEDICARE

## 2017-08-08 DIAGNOSIS — M35.3 POLYMYALGIA RHEUMATICA: ICD-10-CM

## 2017-08-08 LAB
CRP SERPL-MCNC: 5.4 MG/L
ERYTHROCYTE [SEDIMENTATION RATE] IN BLOOD BY WESTERGREN METHOD: 15 MM/HR

## 2017-08-08 PROCEDURE — 85651 RBC SED RATE NONAUTOMATED: CPT

## 2017-08-08 PROCEDURE — 36415 COLL VENOUS BLD VENIPUNCTURE: CPT | Mod: PO

## 2017-08-08 PROCEDURE — 86140 C-REACTIVE PROTEIN: CPT

## 2017-08-22 ENCOUNTER — CLINICAL SUPPORT (OUTPATIENT)
Dept: REHABILITATION | Facility: HOSPITAL | Age: 72
End: 2017-08-22
Attending: ORTHOPAEDIC SURGERY
Payer: MEDICARE

## 2017-08-22 DIAGNOSIS — M79.601 RIGHT ARM PAIN: ICD-10-CM

## 2017-08-22 DIAGNOSIS — S46.111A STRAIN OF MUSCLE, FASCIA AND TENDON OF LONG HEAD OF BICEPS, RIGHT ARM, INITIAL ENCOUNTER: Primary | ICD-10-CM

## 2017-08-22 PROCEDURE — 97165 OT EVAL LOW COMPLEX 30 MIN: CPT | Mod: PN

## 2017-08-22 PROCEDURE — G8987 SELF CARE CURRENT STATUS: HCPCS | Mod: CK,PN

## 2017-08-22 PROCEDURE — G8988 SELF CARE GOAL STATUS: HCPCS | Mod: CJ,PN

## 2017-08-22 NOTE — PROGRESS NOTES
Occupational Therapy Evaluation    Patient: Sharita Gonzalez   [facility] #: [id number]  Date of evaluation: 08/22/2017     Referring Physician: Christine Simpson, *  Diagnosis:   Encounter Diagnoses   Name Primary?    Strain of muscle, fascia and tendon of long head of biceps, right arm, initial encounter Yes    Right arm pain      Referral Orders: Eval and Treat  Age: 71 y.o.  Sex: male          Hand dominance: Right    Time In: 8:00 am  Time Out: 8:50 am     1 of 20 visits exp 12/31/17    Occupation: retired  Working presently: No  Last time worked: 9 years  Workmen's Compensation: No    Date of onset: May 2017  Involved area: right  Mechanism of Injury: Pt reports that he was carrying a butane tank in his hand and tripped and jolted his right arm.    Past Medical History:   Diagnosis Date    Hypertension     Polymyalgia     Prostate cancer     previous proximal bicep tendon repair    Precautions:   Current Outpatient Prescriptions   Medication Sig    aspirin 81 mg Tab Take 81 mg by mouth. 1 Tablet Oral Every day    multivitamin (THERAGRAN) per tablet Take by mouth. 1 Tablet Oral Every day    predniSONE (DELTASONE) 5 MG tablet Take 2 tablets (10 mg total) by mouth once daily.    trandolapril (MAVIK) 4 MG Tab     verapamil (CALAN-SR) 240 MG CR tablet Take 240 mg by mouth every evening.     No current facility-administered medications for this visit.      Review of patient's allergies indicates:   Allergen Reactions    Adhesive Rash    Latex, natural rubber Rash     X-Rays/Tests: Findings: AP, lateral and oblique radiographs of the right elbow demonstrate a tiny enthesophyte at the lateral epicondyles.  Otherwise, the osseous structures, soft tissues and joint spaces are normal.    MRI: Findings: Status post biceps tenodesis.  Proximal biceps tendon is not well evaluated on large field-of-view examination.  Biceps muscle is unremarkable.  No evidence for strain or myotendinous injury.  The distal biceps  tendon is intact to the level of the elbow joint.  Insertional tendon is not evaluated.  No fluid collections or masses.  Neurovascular structures are unremarkable.  Bone marrow signal is maintained.    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  More fatigue then pain    Pain:  During no work: 2/10  While workin-2/10  Sleepin-2/10  Location of pain: right bicep/tricep areas and shoulder joint    Sharita's goals for therapy are: Get my arm to function normal and not have it fatigue    Objective:  Sensation Test: Patient denies any numbness/tingling, sensation is grossly intact    Observation/Inspection   Left Right   Anatomic Symmetry normal normal   Bony normal normal   Suparspinatus normal normal   Infraspinatus normal normal   Rhomboid normal normal     Observation/Inspection:  Good posture, no dyskinesia in scapula noted      Range of Motion:   Right: IR 65 to waist  20 h. Adduction  All others WFL, slight crepitus with end range abduction, feels bicep with supination  Left: IR 75 to mid back  30 h. adduction    Painful Arc:   Patient demonstrates no painful arc in shoulder flexion or abduction      Strength  Shoulder Flexion RUE: 4+/5   Shoulder Extension RUE: 4+/5   Shoulder Abduction RUE:  3/5   Shoulder Adduction RUE:  4+/5   Internal Rotation RUE: 4-/5   External Rotation RUE: 3+/5   Elbow flexion/ ext RUE: 4+/5 / 4+/5   Shoulder Flexion LUE: 4+/5   Shoulder Extension LUE: 4+/5   Shoulder Abduction LUE: 3+/5   Shoulder Adduction LUE: 4+/5   Internal Rotation LUE:  4+/5   External Rotation LUE:  4-/5   Elbow flexion/ extension  4+/5  / 4-/5       Pull Tests: n/t  Abduction:   Ext. Rotation:     Special Tests:  Positive: Empty can test  Negative: Neer Impingement and Speed's Test      Palpation: (for pain)     Positive: Bicipital Groove - slight     Negative: Lateral Subacromial Space, Anterior Subacromial Space, Posterior Subacromial Space, Infraspinatus  Region, AC joint and Supraspinatus Region    Cultural/Spiritual/Education Needs: none noted or reported  Barriers to Learning: none noted or reported    Limitations of Functional Status:   Self Care: no difficulty with any self care tasks  Work: able to lift and carry groceries, able to cut grass, has difficulty with the weedeater at times, has to switch hands with blowing the grass, right arm fatigues with cleaning off boat.  Able to carry ice chests  Leisure: arm fatigues with casting during bass [employer]    Treatment included: OT evaluation, the following exercises (HEP) were instructed and Sharita was able to demonstrate them prior to the end of the session. HEP are as follows:   theraband scapular retraction(horizontal rows), adduction and ER.  Pt provided with non latex theraband yellow and written instructions.  Education re: shoulder structure and pathology      Assessment  This 71 y.o. male referred to Outpatient Occupational Therapy with diagnosis of   Encounter Diagnoses   Name Primary?    Strain of muscle, fascia and tendon of long head of biceps, right arm, initial encounter Yes    Right arm pain     presents with limitations as described in problem list. Patient can benefit from Occupational Therapy services for Ultrasound, moist heat, ice, strengthening, Theraband Ex and UBE. The following goals were discussed with the patient and he is in agreement with them as to be addressed in the treatment plan.     Problem List:   Decreased function of Right UE, Increased pain, Decreased strength and difficulty with leisure activiites    Goals to be met in 8 weeks:  1) Pt will be independent and report performing HEP to maximize (R) shoulder functional capacity.  2) Pt will increase right shoulder strength shoulder to 4+/5 to engage in leisure tasks  3) Pt will report use of home modalities for pain management.  4) Pt will report 0 out of 10 with use.      G CODE TOOL: FOTO  Self care  Current Score  = 60 or 40%  impaired   Goal at Discharge Score 72 or 28% impaired   Discharge status Score =  or % impaired     Score interpretation is as follows:     TEST SCORE  Modifier  Impairment Limitation Restriction    0%  CH  0 % impaired, limited or restricted    1-19%  CI  @ least 1% but less than 20% impaired, limited or restricted    20-39%  CJ  @ least 20%<40% impaired, limited or restricted    40-59%  CK  @ least 40%<60% impaired, limited or restricted    60-79%  CL  @ least 60% <80% impaired, limited or restricted    80-99%  CM  @ least 80%<100% impaired limited or restricted    100%  CN  100% impaired, limited or restricted     History Examination Decision Making Complexity Score   Occupational Profile:   Retired     Medical and Therapy History:   Hx of bicep repair of right bicep, polymyalgia, no therapy for present condition    Brief               Performance Deficits     Physical  Weakness of right shoulder, fatigue reported, difficulty with leisure and home tasks      Cognitive intact      Psychosocial:  Pt actively engaged in leisure and care of his wife       FOTO score 40% limitation    Pt will benefit from strengthening of RUE, modalities to promote healing, progressive HEP  1 x week x 8 weeks    Pt was provided with HEP today to begin cuff strengthening.    LOW       Plan  Sharita to be treated by Occupational Therapy 1 times per week for 8weeks to achieve the established goals during certification period of 8/22/17 to 9/17/17.   Treatment to include strengthening, manual therapy, progressive HEP, modalities prn as well as any other treatments deemed necessary based on the patient's needs or progress.         I certify the need for these services furnished under this plan of treatment and while under my care    ____________________________________  Physician/Referring Practitioner    _______________  Date of Signature

## 2017-08-29 ENCOUNTER — CLINICAL SUPPORT (OUTPATIENT)
Dept: REHABILITATION | Facility: HOSPITAL | Age: 72
End: 2017-08-29
Attending: ORTHOPAEDIC SURGERY
Payer: MEDICARE

## 2017-08-29 DIAGNOSIS — S46.111A STRAIN OF MUSCLE, FASCIA AND TENDON OF LONG HEAD OF BICEPS, RIGHT ARM, INITIAL ENCOUNTER: Primary | ICD-10-CM

## 2017-08-29 DIAGNOSIS — M79.601 RIGHT ARM PAIN: ICD-10-CM

## 2017-08-29 PROCEDURE — 97110 THERAPEUTIC EXERCISES: CPT | Mod: PN

## 2017-08-29 PROCEDURE — 97035 APP MDLTY 1+ULTRASOUND EA 15: CPT | Mod: PN

## 2017-08-29 NOTE — PROGRESS NOTES
Patient:  Sharita Gonzalez  [facility] #:  [id number]   Date of Note: 08/29/2017   Referring Physician:  Christine Simpson, *  Diagnosis:    Encounter Diagnoses   Name Primary?    Strain of muscle, fascia and tendon of long head of biceps, right arm, initial encounter Yes    Right arm pain         Start Time: 7:45 am  End Time: 8:30 am  Total Time: 45 min  Group Time: -      Subjective:   My arm was sore after the first therapy session but I did go fishing after that visit  Pain: 2 out of 10     Objective:   Patient seen by OT this session. Treatment  consist of the following:  Ultrasound and Therapeutic exercises    Treatment: Ultrasound x 8 min and Therapeutic exercises x 37 min   Patient receives ultrasound  for pain control and increased inflammation @ continuous duty cycle, 1.0 Mhz, applied to right anterior and posterior shoudler, intensity = 1.0 w/cm2 for 8 minutes.  Soft tissue massage reveals no tenderness of shoulder tissue. Pt rec'd stretching of right shoulder into all planes with soft end feel noted with all motions.  Pt c/o eccentric pain from flexion.   Prone 2# extension with ER x 2 sets of 10 reps f/b prone rows with 2# resistance x 2 sets of 10 reps. Vertical rows with cable cross and 3# resistance, assistance with multiple reps for proper performance x 3 min.   ER with yellow theraband performed x 2 sets of 10 reps.   Ice applied to right shoulder after ex.  Pt instructed to perform his theraband ex with 1 set of orange band and 1 set with yellow.  Pt expressed understanding.    Plan of care rec'd from Dr. Simpson for care from 8/22/17 to 10/12/17    Assessment:  Pt with continues with discomfort in shoulder.  Some difficulty with scapular stabilization during vertical rows. Pt will continue to benefit from skilled OT intervention. Medical necessity is demonstrated by: Pt continues to be limited in functional and leisurely pursuits. Pain limits patients participation in ADL's. Pt is not able to  carryout necessary vocational tasks. Patient continues to requires cues and skilled supervision to complete HEP    Plan:  Continue with plan of care 1 x week x 8weeks  during the certification period from   8/22/17 to 10/12/17  in pursuit of  OT goals.

## 2017-09-05 ENCOUNTER — CLINICAL SUPPORT (OUTPATIENT)
Dept: REHABILITATION | Facility: HOSPITAL | Age: 72
End: 2017-09-05
Attending: ORTHOPAEDIC SURGERY
Payer: MEDICARE

## 2017-09-05 DIAGNOSIS — S46.111A STRAIN OF MUSCLE, FASCIA AND TENDON OF LONG HEAD OF BICEPS, RIGHT ARM, INITIAL ENCOUNTER: Primary | ICD-10-CM

## 2017-09-05 DIAGNOSIS — M79.601 RIGHT ARM PAIN: ICD-10-CM

## 2017-09-05 PROCEDURE — 97110 THERAPEUTIC EXERCISES: CPT | Mod: PN

## 2017-09-05 PROCEDURE — 97035 APP MDLTY 1+ULTRASOUND EA 15: CPT | Mod: PN

## 2017-09-05 NOTE — PROGRESS NOTES
Patient:  Sharita Gonzalez  [facility] #:  [id number]   Date of Note: 09/05/2017   Referring Physician:  Christine Simpson, *  Diagnosis:    Encounter Diagnoses   Name Primary?    Strain of muscle, fascia and tendon of long head of biceps, right arm, initial encounter Yes    Right arm pain         Start Time: 8:55 am  End Time: 8:30 am  Total Time: 35 min  Group Time: -    2 of 20 visits expires 12/31/17    Subjective:   I tried to trim trees and that didn't go too well, I just couldn't do it.  Pain: 0 out of 10     Objective:   Patient seen by OT this session. Treatment  consist of the following:  Ultrasound and Therapeutic exercises    Treatment: Ultrasound x 8 min and Therapeutic exercises x 27 min   Patient receives ultrasound  for pain control and increased inflammation @ continuous duty cycle, 1.0 Mhz, applied to right anterior and posterior shoudler, intensity = 1.0 w/cm2 for 8 minutes.   Pt rec'd stretching of right shoulder into all planes with soft end feel pain with eccentric pain from flexion.   Pt engaged in supine tricep curls x 20 reps with muscle trembling throughout, supine scapular protraction with hands on assist x 20 reps.  Sidelying ER x 2 sets of 10 reps with ease.  Pt engaged in gator chomp with RUE x 2 sets of 10 reps, scapular winging noted during the initiation phase of motion.  Horizontall rows with matrix using 15#resistance x 2 sets of 10 reps, verbal and manual cues to engage only in retraction and not elevation.  Pt encouraged to continue with his HEP.    Plan of care rec'd from Dr. Simpson for care from 8/22/17 to 10/12/17    Assessment:  Pt with proximal stability of RUE during tricep curls and gator chomps. Pt will continue to benefit from skilled OT intervention. Medical necessity is demonstrated by: Pt continues to be limited in functional and leisurely pursuits. Pain limits patients participation in ADL's. Pt is not able to carryout necessary vocational tasks. Patient continues  to requires cues and skilled supervision to complete HEP    Plan:  Continue with plan of care 1 x week x 8weeks  during the certification period from   8/22/17 to 10/12/17  in pursuit of  OT goals.

## 2017-09-11 ENCOUNTER — CLINICAL SUPPORT (OUTPATIENT)
Dept: REHABILITATION | Facility: HOSPITAL | Age: 72
End: 2017-09-11
Attending: ORTHOPAEDIC SURGERY
Payer: MEDICARE

## 2017-09-11 DIAGNOSIS — S46.111A STRAIN OF MUSCLE, FASCIA AND TENDON OF LONG HEAD OF BICEPS, RIGHT ARM, INITIAL ENCOUNTER: ICD-10-CM

## 2017-09-11 DIAGNOSIS — M79.601 RIGHT ARM PAIN: Primary | ICD-10-CM

## 2017-09-11 PROCEDURE — 97035 APP MDLTY 1+ULTRASOUND EA 15: CPT | Mod: PN

## 2017-09-11 PROCEDURE — 97110 THERAPEUTIC EXERCISES: CPT | Mod: PN

## 2017-09-11 NOTE — PROGRESS NOTES
Patient:  Sharita Gonzalez  [facility] #:  [id number]   Date of Note: 09/11/2017   Referring Physician:  Christine Simpson, *  Diagnosis:    Encounter Diagnoses   Name Primary?    Right arm pain Yes    Strain of muscle, fascia and tendon of long head of biceps, right arm, initial encounter         Start Time: 8:50 am  End Time: 9:30 am  Total Time: 40 min  Group Time: -    4 of 20 visits expires 12/31/17    Subjective:   I went fishing last week and didn't feel as weak as the previous time I fished.  I am still having some catching in the shoulder with movement.  Pain: 3  out of 10 pre terapy    Objective:   Patient seen by OT this session. Treatment  consist of the following:  Ultrasound and Therapeutic exercises    Treatment: Ultrasound x 8 min and Therapeutic exercises x 27 min   Patient receives ultrasound  for pain control and increased inflammation @ continuous duty cycle, 1.0 Mhz, applied to right anterior and posterior shoudler, intensity = 1.0 w/cm2 for 8 minutes.    Sidelying ER x 20 reps.  Pt engaged in gator chomp with RUE x 20 reps, scapular winging noted with eccentric and less with concentric. Supine horizontal adduction x 20 reps with RUE f/b orange theraband resisted horizontal abduction x 2 sets of 10 reps. Horizontall rows with matrix using 25#resistance x 1 sets of 10 reps f/b 30# x 10 reps.  Seated shoulder flexion with better eccentric control. Tricep curls on matrix with 40# resistance x 20 reps.  Vertical rows with 3# resistance on cable cross x 3 min interval.  Pt reported slight tiredness upon completion but no increases in pain. Pt encouraged to continue with his HEP.    Plan of care rec'd from Dr. Simpson for care from 8/22/17 to 10/12/17    Assessment:  Pt with improved control of scapula with strengthening exercises today.  Pt will continue to benefit from skilled OT intervention. Medical necessity is demonstrated by: Pt continues to be limited in functional and leisurely pursuits. Pain  limits patients participation in ADL's. Pt is not able to carryout necessary vocational tasks. Patient continues to requires cues and skilled supervision to complete HEP    Plan:  Continue with plan of care 1 x week x 8weeks  during the certification period from   8/22/17 to 10/12/17  in pursuit of  OT goals.

## 2017-09-19 ENCOUNTER — CLINICAL SUPPORT (OUTPATIENT)
Dept: REHABILITATION | Facility: HOSPITAL | Age: 72
End: 2017-09-19
Attending: ORTHOPAEDIC SURGERY
Payer: MEDICARE

## 2017-09-19 DIAGNOSIS — M79.601 RIGHT ARM PAIN: Primary | ICD-10-CM

## 2017-09-19 DIAGNOSIS — S46.111A STRAIN OF MUSCLE, FASCIA AND TENDON OF LONG HEAD OF BICEPS, RIGHT ARM, INITIAL ENCOUNTER: ICD-10-CM

## 2017-09-19 PROCEDURE — 97110 THERAPEUTIC EXERCISES: CPT | Mod: PN

## 2017-09-19 PROCEDURE — 97035 APP MDLTY 1+ULTRASOUND EA 15: CPT | Mod: PN

## 2017-09-19 PROCEDURE — G8988 SELF CARE GOAL STATUS: HCPCS | Mod: CJ,PN

## 2017-09-19 PROCEDURE — G8987 SELF CARE CURRENT STATUS: HCPCS | Mod: CJ,PN

## 2017-09-19 NOTE — PROGRESS NOTES
Progress Note    Patient:  Sharita Gonzalez  [facility] #:  [id number]   Date of Note: 2017   Referring Physician:  Christine Simpson, *  Diagnosis:    Encounter Diagnoses   Name Primary?    Right arm pain Yes    Strain of muscle, fascia and tendon of long head of biceps, right arm, initial encounter         Start Time: 7:45 am  End Time: 8:35 am  Total Time: 50 min  Group Time: -    5 of 20 visits expires 17    Subjective:   I went fishing and changed how I casted and my arm really didn't hurt.  We caught a lot of fish. Most of the time my arm doesn't hurt at all.  Pain:  During no work: -2/10  While workin-2/10  Sleepin/10    Objective:   Patient seen by OT this session. Treatment  consist of the following:  Ultrasound and Therapeutic exercises    Treatment: Ultrasound x 8 min and Therapeutic exercises x 34 min, ice x 8 min    Range of Motion:   Right: IR 70(+5) to T-12,   30(+10) h. Adduction  All others WFL, slight crepitus with end range abduction, feels bicep with supination  Left: IR 75 to mid back  30 h. adduction    Painful Arc:   Patient demonstrates no painful arc in shoulder flexion or abduction      Strength  Shoulder Flexion RUE: 4+/5   Shoulder Extension RUE: 4+/5   Shoulder Abduction RUE:  3+/5   Shoulder Adduction RUE:  4+/5   Internal Rotation RUE: 4+/5   External Rotation RUE: 4/5   Elbow flexion/ ext RUE: 4+/5 / 4+/5   Shoulder Flexion LUE: 4+/5   Shoulder Extension LUE: 4+/5   Shoulder Abduction LUE: 3+/5   Shoulder Adduction LUE: 4+/5   Internal Rotation LUE:  4+/5   External Rotation LUE:  4-/5   Elbow flexion/ extension  4+/5  / 4-/5        Patient receives ultrasound  for pain control and increased inflammation @ continuous duty cycle, 1.0 Mhz, applied to right anterior and posterior shoudler, intensity = 1.0 w/cm2 for 8 minutes.  Supine scapular protraction x 20 reps.   Sidelying ER x 20 reps.  2# resisted supine tricep curls x 2 sets of 10 reps.  Sidelying ER with 1#  resistance x 2 sets of 10 reps.  Pt engaged in gator chomp with RUE x 2 sets 10 reps minimal winging noted.  Prone h.abduction performed with RUE x 20 reps.  Prone extension with ER using 2# resistance x 20 rep.  Horizontal rows with matrix using 25#resistance x 2 sets of 10 reps .  Tricep curls on matrix with 40# resistance x 20 reps.  Pt encouraged to continue with his HEP.  Ice applied to right shoulder x 8 min post therapy.    Plan of care rec'd from Dr. Noel for care from 8/22/17 to 10/12/17    Assessment:  Pt is demonstrating increased AROM of right shoulder as well as improvement in right shoulder strength.  Less overall scapular winging noted with active motion..     Goals to be met in 8 weeks:  1) Pt will be independent and report performing HEP to maximize (R) shoulder functional capacity-continuous  2) Pt will increase right shoulder strength shoulder to 4+/5 to engage in leisure tasks- progressing  3) Pt will report use of home modalities for pain management - continues  4) Pt will report 0 out of 10 with use - improvment    G CODE TOOL: FOTO  Self care  Current Score  = 65 or 35% impaired    Goal at Discharge Score 72 or 28% impaired  progressing  Discharge status Score =  or % impaired      Pt will continue to benefit from skilled OT intervention. Medical necessity is demonstrated by: Pt continues to be limited in functional and leisurely pursuits. Pain limits patients participation in ADL's. Pt is not able to carryout necessary vocational tasks. Patient continues to requires cues and skilled supervision to complete HEP    Plan:  Continue with plan of care 1 x week x 8weeks  during the certification period from   8/22/17 to 10/12/17  in pursuit of  OT goals.

## 2017-09-29 ENCOUNTER — CLINICAL SUPPORT (OUTPATIENT)
Dept: REHABILITATION | Facility: HOSPITAL | Age: 72
End: 2017-09-29
Attending: ORTHOPAEDIC SURGERY
Payer: MEDICARE

## 2017-09-29 DIAGNOSIS — S46.111A STRAIN OF MUSCLE, FASCIA AND TENDON OF LONG HEAD OF BICEPS, RIGHT ARM, INITIAL ENCOUNTER: ICD-10-CM

## 2017-09-29 DIAGNOSIS — M79.601 RIGHT ARM PAIN: Primary | ICD-10-CM

## 2017-09-29 PROCEDURE — 97110 THERAPEUTIC EXERCISES: CPT | Mod: PN

## 2017-09-29 NOTE — PROGRESS NOTES
Progress Note    Patient:  Sharita Gonzalez  [facility] #:  [id number]   Date of Note: 09/29/2017   Referring Physician:  Christine Simpson, *  Diagnosis:    Encounter Diagnoses   Name Primary?    Right arm pain Yes    Strain of muscle, fascia and tendon of long head of biceps, right arm, initial encounter         Start Time: 8:40 am  End Time: 9:30 am  Total Time: 50 min  Group Time: -    6 of 20 visits expires 12/31/17    Subjective:   Pt states he went fishing with his son in a tournament and his arm was sore at night but more fatigued.   Pain: 0 out of 10     Objective:   Patient seen by OT this session. Treatment  consist of the following:  Ultrasound and Therapeutic exercises    Treatment:  Therapeutic exercises x 40 min, ice x 10 min      Pt engaged in sci fit with BUE maintaining speed and verbal cues for directional changes.  Doorway stretch for ER x 10 reps holding x 5 seconds each.  Matrix equipment ex as follows:  Chest press with 10# resistance x 2 sets of 10 reps.   Horizontal rows with 30# resistance x 3 sets of 10 reps.  Tricep curls 40#  x 2 sets of 10 reps.  Prabha yellow theraband loop wall step outs 3 positions alternating arms x 5 cycles.   Rebounder chest pass x 30 reps.  Red theratubing with ER step outs x 10 reps.  Pt attempted scapular protraction with theraband and on wall with difficulty.  Ice applied to right shoulder x 8 min post therapy. Pt encouraged to continue with his HEP.    Plan of care rec'd from Dr. Simpson for care from 8/22/17 to 10/12/17    Assessment:  Pt with poor scapular control in standing with and without resistance.  Pt will continue to benefit from skilled OT intervention. Medical necessity is demonstrated by: Pt continues to be limited in functional and leisurely pursuits. Pain limits patients participation in ADL's. Pt is not able to carryout necessary vocational tasks. Patient continues to requires cues and skilled supervision to complete HEP    Plan:  Continue  with plan of care 1 x week x 8weeks  during the certification period from   8/22/17 to 10/12/17  in pursuit of  OT goals.

## 2017-10-02 ENCOUNTER — CLINICAL SUPPORT (OUTPATIENT)
Dept: REHABILITATION | Facility: HOSPITAL | Age: 72
End: 2017-10-02
Attending: ORTHOPAEDIC SURGERY
Payer: MEDICARE

## 2017-10-02 ENCOUNTER — TELEPHONE (OUTPATIENT)
Dept: FAMILY MEDICINE | Facility: CLINIC | Age: 72
End: 2017-10-02

## 2017-10-02 ENCOUNTER — CLINICAL SUPPORT (OUTPATIENT)
Dept: FAMILY MEDICINE | Facility: CLINIC | Age: 72
End: 2017-10-02
Payer: MEDICARE

## 2017-10-02 DIAGNOSIS — Z23 NEED FOR INFLUENZA VACCINATION: Primary | ICD-10-CM

## 2017-10-02 DIAGNOSIS — M79.601 RIGHT ARM PAIN: Primary | ICD-10-CM

## 2017-10-02 DIAGNOSIS — S46.111A STRAIN OF MUSCLE, FASCIA AND TENDON OF LONG HEAD OF BICEPS, RIGHT ARM, INITIAL ENCOUNTER: ICD-10-CM

## 2017-10-02 PROCEDURE — 97110 THERAPEUTIC EXERCISES: CPT | Mod: PN

## 2017-10-02 PROCEDURE — 90662 IIV NO PRSV INCREASED AG IM: CPT | Mod: S$GLB,,, | Performed by: INTERNAL MEDICINE

## 2017-10-02 PROCEDURE — G0008 ADMIN INFLUENZA VIRUS VAC: HCPCS | Mod: S$GLB,,, | Performed by: INTERNAL MEDICINE

## 2017-10-02 NOTE — PROGRESS NOTES
Influenza vaccine administered, dalton well. Instructed to wait 15mins for observation, no reaction noted @ time of discharge.

## 2017-10-06 ENCOUNTER — OFFICE VISIT (OUTPATIENT)
Dept: PODIATRY | Facility: CLINIC | Age: 72
End: 2017-10-06
Payer: MEDICARE

## 2017-10-06 VITALS
WEIGHT: 190 LBS | HEART RATE: 67 BPM | SYSTOLIC BLOOD PRESSURE: 126 MMHG | BODY MASS INDEX: 31.65 KG/M2 | HEIGHT: 65 IN | DIASTOLIC BLOOD PRESSURE: 78 MMHG

## 2017-10-06 DIAGNOSIS — L60.0 INGROWN NAIL: Primary | ICD-10-CM

## 2017-10-06 PROCEDURE — 99999 PR PBB SHADOW E&M-EST. PATIENT-LVL III: CPT | Mod: PBBFAC,,, | Performed by: PODIATRIST

## 2017-10-06 PROCEDURE — 99202 OFFICE O/P NEW SF 15 MIN: CPT | Mod: S$GLB,,, | Performed by: PODIATRIST

## 2017-10-06 RX ORDER — CEPHALEXIN 500 MG/1
500 CAPSULE ORAL EVERY 12 HOURS
Qty: 10 CAPSULE | Refills: 0 | Status: SHIPPED | OUTPATIENT
Start: 2017-10-06 | End: 2017-10-16

## 2017-10-06 NOTE — PATIENT INSTRUCTIONS
Ingrown Toenail (Excised)  An ingrown toenail occurs when the nail grows sideways into the skin next to the nail. This can cause pain and may lead to an infection with redness, swelling, and sometimes drainage.  The most common cause of an ingrown toenail is trimming your toenails wrong. Most people trim the nails too close to the skin and try to round the nail too tightly around the shape of the toe. When you do this, the nail can grow into the skin of the toe. While it may look nice, your toenail can grow into the skin and cause infection. It is safer to trim the nail to end in a straight line rather than a curve.  Other causes include injury or wearing shoes that are too short or tight. This can cause the same problem that happens when trimming your toenails. Sometimes you are born with a toenail that grows too large for your toe.  The most common symptoms of an ingrown toenail include:  · Pain  · Redness  · Swelling  · Drainage  Treatment  It's important to treat an ingrown toenail as soon as you notice there is a problem. If the infection is mild, you may be able to take care of it at home. Home care includes:  · Frequent warm water soaks  · Keeping the nail clean  · Wearing loose, comfortable shoes or open toe sandals  Another method to help the toe heal is to use a small piece of cotton or waxed dental floss to gently lift the corner of the problem nail. Change the cotton or floss frequently, especially if it gets dirty.  If your infection is mild but home care isn't working, or the toenail is getting worse, see your healthcare provider. Signs of worsening infection include:  · Swelling  · Redness   · Pus drainage  In some cases, part of the toenail needs to be removed by your healthcare provider so that the infection can be drained.  If there is a lot of redness and swelling, then an antibiotic may also be used. The redness and pain should go away within 48 hours. It will take about 2 weeks for the exposed  nail bed to become dry and for the swelling to go down.  If only the side of the nail was removed, it will begin to grow back in a few months. To prevent recurrence, sometimes the side of the nail bed may be treated with a strong chemical to prevent the nail from growing back.  Home care  Wound care  · Twice a day for the first 3 days, clean and soak the toe as follows:  ¨ Soak your foot in a tub of warm water for 5 minutes. Or, hold your toe under a faucet of warm running water for 5 minutes.  ¨ Clean any remaining crust away with soap and water using a cotton swab.  ¨ Put a small amount of antibiotic ointment on the infected area.  ¨ Cover with a bandage until the exposed nail bed is dry and there is no more drainage.  · Change the dressing or bandage every time you soak or clean it, or whenever it becomes wet or dirty.  · If you were prescribed antibiotics, take them as directed until they are all gone.  · While your toe is healing wear comfortable shoes with a lot of toe room. Or wear open-toe sandals.  Medicines  · You can take over-the-counter medicine for pain, unless you were given a different pain medicine to use. Note: Talk with your healthcare provider before using these medicines if you have chronic liver or kidney disease, have ever had a stomach ulcer or GI (gastrointestinal) bleeding, or are taking blood thinner medicines.  · If you were given antibiotics, take them until they are all gone. It is important to finish the antibiotics even if the wound looks better. This ensures that the infection clears.  Prevention  To prevent ingrown toenails:  · Wear shoes that fit well. Avoid shoes that pinch the toes together.  · When you trim your toenails, dont cut them too short. Cut straight across at the top and dont round the edges.  · Dont use a sharp object to clean under your nail since this might cause an infection.  · If the toenail starts to grow into the skin again, put a small piece of cotton under  that side of the nail to help it grow out straight.  Follow-up care  Follow up as advised by your healthcare provider. If the ingrown toenail recurs, follow up with a foot specialist (podiatrist) for nail bed ablation.  When to seek medical care  Call your healthcare provider right away if any of these occur:  · Increasing redness, pain, or swelling of the toe  · Red streaks in the skin leading away from the wound  · Continued pus or fluid drainage for more than 24 hours  · Fever of 100.4°F (38°C) or higher, or as directed by your provider  Date Last Reviewed: 11/1/2016 © 2000-2017 [email or web address]. [address] [place] [address], [place] [address]. All rights reserved. This information is not intended as a substitute for professional medical care. Always follow your healthcare professional's instructions.        Understanding Ingrown Toenails    An ingrown nail is the result of a nail growing into the skin that surrounds it. This often occurs at either edge of the big toe. Ingrown nails may be caused by improper trimming, inherited nail deformities, injuries, fungal infections, or pressure.  Symptoms  Ingrown nails may cause pain at the tip of the toe or all the way to the base of the toe. The pain is often worse while walking. An ingrown nail may also lead to infection, inflammation, or a more serious condition. If its infected, you might see pus or redness.  Evaluation  To determine the extent of your problem, your healthcare provider examines and possibly presses the painful area. If other problems are suspected, blood tests, cultures, and X-rays may be done as well.  Treatment  If the nail isnt infected, your healthcare provider may trim the corner of it to help relieve your symptoms. He or she may need to remove one side of your nail back to the cuticle. The base of the nail may then be treated with a chemical to keep the ingrown part from growing back. Severe infections or ingrown nails may require  antibiotics and temporary or permanent removal of a portion of the nail. To prevent pain, a local anesthetic may be used in these procedures. This treatment is usually done at your healthcare provider's office.  Prevention  Many nail problems can be prevented by wearing the right shoes and trimming your nails properly. To help avoid infection, keep your feet clean and dry. If you have diabetes, talk with your healthcare provider before doing any foot self-care.  · The right shoes: Get your feet measured (your size may change as you age). Wear shoes that are supportive and roomy enough for your toes to wiggle. Look for shoes made of natural materials such as leather, which allow your feet to breathe.  · Proper trimming: To avoid problems, trim your toenails straight across without cutting down into the corners. If you cant trim your own nails, ask your healthcare provider to do so for you.  Date Last Reviewed: 10/1/2016  © 2222-0488 [employer]. [address] [place] [address], [place] [address]. All rights reserved. This information is not intended as a substitute for professional medical care. Always follow your healthcare professional's instructions.

## 2017-10-06 NOTE — PROCEDURES
Nail Removal  Date/Time: 10/7/2017 11:32 AM  Performed by: ANÍBAL RIZZO  Authorized by: ANÍBAL RIZZO     Consent Done?:  Yes (Written)    Location:  Left foot  Location detail:  Left big toe  Anesthesia:  Digital block  Local anesthetic: lidocaine 2% without epinephrine  Anesthetic total (ml):  3  Preparation:  Skin prepped with alcohol    Amount removed:  1/5  Side:  Radial  Wedge excision of skin of nail fold: No    Nail bed sutured?: No    Nail matrix removed:  None  Removed nail replaced and anchored: No    Dressing applied:  4x4, antibiotic ointment and dressing applied  Patient tolerance:  Patient tolerated the procedure well with no immediate complications

## 2017-10-06 NOTE — PROGRESS NOTES
Subjective:      Patient ID: Sharita Gonzalez is a 72 y.o. male.    Chief Complaint: PCP (nevin 09/2017) and Ingrown Toenail    Sharita is a 72 y.o. male who presents to the clinic complaining of painful ingrown toenail on the left foot. Denies injury or signs of infection.  Pain worsening for about 2 months.     Review of Systems   Constitution: Negative for chills, fever, weakness, malaise/fatigue and night sweats.   Cardiovascular: Negative for chest pain, leg swelling, orthopnea and palpitations.   Respiratory: Negative for cough, shortness of breath and wheezing.    Skin: Negative for color change, itching, poor wound healing and rash.   Musculoskeletal: Negative for arthritis, gout, joint pain, joint swelling, muscle weakness and myalgias.   Gastrointestinal: Negative for abdominal pain, constipation and nausea.   Neurological: Negative for disturbances in coordination, dizziness, focal weakness, numbness and tremors.           Objective:      Physical Exam   Constitutional: He is oriented to person, place, and time. Vital signs are normal. He appears well-developed. He is cooperative. No distress.   Cardiovascular: Intact distal pulses.    Pulses:       Dorsalis pedis pulses are 2+ on the right side, and 2+ on the left side.        Posterior tibial pulses are 2+ on the right side, and 2+ on the left side.   Musculoskeletal:        Right ankle: Normal.        Left ankle: Normal.        Right foot: There is normal range of motion, no bony tenderness, normal capillary refill, no crepitus and no deformity.        Left foot: There is tenderness. There is normal range of motion, no bony tenderness, normal capillary refill, no crepitus and no deformity.          Neurological: He is alert and oriented to person, place, and time. He has normal strength. No sensory deficit. He exhibits normal muscle tone. Gait normal.   Reflex Scores:       Achilles reflexes are 2+ on the right side and 2+ on the left side.  Skin: Skin is  intact. Capillary refill takes 2 to 3 seconds. No abrasion, no ecchymosis, no lesion and no rash noted. No erythema. Nails show no clubbing.   Inflamed cryptotic border of nail with no drainage, pus nor malodor but mild erythema and edema of the associated nail fold, medial border, left hallux             Assessment:       Encounter Diagnosis   Name Primary?    Ingrown nail - Left Foot Yes         Plan:       Sharita was seen today for pcp and ingrown toenail.    Diagnoses and all orders for this visit:    Ingrown nail - Left Foot  -     cephALEXin (KEFLEX) 500 MG capsule; Take 1 capsule (500 mg total) by mouth every 12 (twelve) hours.      I counseled the patient on his conditions, their implications and medical management.    Discussed conservative vs surgical treatment of recurrent painful ingrown toenails with associated risks and benefits.    Discussed importance of supportive shoes with accommodative toe box to reduce pressure and irritation to forefoot    Ingrown nail plate removed but nail matrix left intact. See procedure note.  .

## 2017-10-07 PROCEDURE — 11730 AVULSION NAIL PLATE SIMPLE 1: CPT | Mod: TA,S$GLB,, | Performed by: PODIATRIST

## 2017-10-09 ENCOUNTER — CLINICAL SUPPORT (OUTPATIENT)
Dept: REHABILITATION | Facility: HOSPITAL | Age: 72
End: 2017-10-09
Attending: ORTHOPAEDIC SURGERY
Payer: MEDICARE

## 2017-10-09 DIAGNOSIS — S46.111A STRAIN OF MUSCLE, FASCIA AND TENDON OF LONG HEAD OF BICEPS, RIGHT ARM, INITIAL ENCOUNTER: ICD-10-CM

## 2017-10-09 DIAGNOSIS — M79.601 RIGHT ARM PAIN: Primary | ICD-10-CM

## 2017-10-09 PROCEDURE — 97110 THERAPEUTIC EXERCISES: CPT | Mod: PN
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
## 2017-10-16 ENCOUNTER — CLINICAL SUPPORT (OUTPATIENT)
Dept: REHABILITATION | Facility: HOSPITAL | Age: 72
End: 2017-10-16
Attending: ORTHOPAEDIC SURGERY
Payer: MEDICARE

## 2017-10-16 DIAGNOSIS — S46.111A STRAIN OF MUSCLE, FASCIA AND TENDON OF LONG HEAD OF BICEPS, RIGHT ARM, INITIAL ENCOUNTER: ICD-10-CM

## 2017-10-16 DIAGNOSIS — M79.601 RIGHT ARM PAIN: Primary | ICD-10-CM

## 2017-10-16 PROCEDURE — 97110 THERAPEUTIC EXERCISES: CPT | Mod: PN

## 2017-10-16 NOTE — PROGRESS NOTES
Patient:  Sharita Gonzalez  [facility] #:  [id number]   Date of Note: 10/16/2017   Referring Physician:  Christine Simpson, *  Diagnosis:    Encounter Diagnoses   Name Primary?    Right arm pain Yes    Strain of muscle, fascia and tendon of long head of biceps, right arm, initial encounter         Start Time: 9:00 am  End Time: 9;45 am  Total Time: 45 min  35 direct care  Group Time: -    9 of 20 visits expires 12/31/17    Subjective:   Pt states he went fishing on Thursday, cut the grass at his house and his neighbors on Friday and felt fine but then slept bad on Saturday night and soreness on Sunday while eating lunch.   Pain: 0 out of 10 pre therapy    Objective:   Patient seen by OT this session. Treatment  consist of the following:  Therapeutic exercises    Treatment:  Therapeutic exercises x 35 min, ice x 10 min      Pt engaged in sci fit with BUE maintaining speed and verbal cues for directional changes.  Doorway stretch for ER x 10 reps holding x 5 seconds each.  Pt also performed IR stretch with non resistive pulley x 10 reps holding x 5 seconds.  Pt engaged in varinder scapular stabilization with step outs on wall  3 positions x 10 cycles.  Matrix tricep curls with 40# x 2 sets of 10 reps- good eccentric control.   Varinder ball on wall scapular stabilization CCW/CW rotations x 2 cycles of 10 reps.   Matrix equipment ex as follows:  Horizontal rows with 30# resistance x 2 sets of 10 reps.  Chest press with 10# resistance x 2 sets of 10 reps.   Ice applied to right shoulder x 8 min post therapy.  Pt encouraged to continue with her HEP.    Plan of care rec'd from Dr. Simpson for care from 10/9/17 to 11/20/17    Assessment:  Pt with good quality motion of during resistive ex.  Pt will continue to benefit from skilled OT intervention. Medical necessity is demonstrated by: Pt continues to be limited in functional and leisurely pursuits. Pain limits patients participation in ADL's. Pt is not able to carryout  necessary vocational tasks. Patient continues to requires cues and skilled supervision to complete HEP    Plan:  Continue with plan of care 1 x week x 8weeks  during the certification period from   8/22/17 to 10/12/17  in pursuit of  OT goals.

## 2017-10-23 ENCOUNTER — CLINICAL SUPPORT (OUTPATIENT)
Dept: REHABILITATION | Facility: HOSPITAL | Age: 72
End: 2017-10-23
Attending: ORTHOPAEDIC SURGERY
Payer: MEDICARE

## 2017-10-23 DIAGNOSIS — S46.111A STRAIN OF MUSCLE, FASCIA AND TENDON OF LONG HEAD OF BICEPS, RIGHT ARM, INITIAL ENCOUNTER: ICD-10-CM

## 2017-10-23 DIAGNOSIS — M79.601 RIGHT ARM PAIN: Primary | ICD-10-CM

## 2017-10-23 PROCEDURE — 97110 THERAPEUTIC EXERCISES: CPT | Mod: PN

## 2017-10-23 PROCEDURE — G8988 SELF CARE GOAL STATUS: HCPCS | Mod: CJ,PN

## 2017-10-23 PROCEDURE — G8989 SELF CARE D/C STATUS: HCPCS | Mod: CK,PN

## 2017-10-23 NOTE — PROGRESS NOTES
Progress Note/ D/C Summary    Patient:  Sharita Gonzalez  [facility] #:  [id number]   Date of Note: 10/23/2017   Referring Physician:  Christine Simpson, *  Diagnosis:    Encounter Diagnoses   Name Primary?    Right arm pain Yes    Strain of muscle, fascia and tendon of long head of biceps, right arm, initial encounter       Start Time: 8:55 am  End Time: 9:40 am  Total Time: 45 min  37 min direct care  Group Time: -    10 of 20 visits expires 12/31/17    Subjective:   Pt reports he feels like it is just not getting better.  Pt states he went fishing last week and his arm was sore for 2 days.   Pain: 3-4 out of 10  When it hurts, sometimes sleeping, sometimes after use, or just sitting    Objective:   Patient seen by OT this session. Treatment  consist of the following:  Ultrasound and Therapeutic exercises    Treatment:  Therapeutic exercises x 37 min, ice x 8 min      Pt engaged in sci fit with BUE maintaining speed and verbal cues for directional changes.  Re-Assessment as follows:    Range of Motion:   Right: IR 65(=) to waist  h. Adduction 20 (=)  All others WFL, slight crepitus eccentric abduction  Left: IR 75 to mid back  30 h. adduction    Strength- only RUE retested.  Shoulder Flexion RUE: 4+/5   Shoulder Extension RUE: 4+/5   Shoulder Abduction RUE:  3+/5 unchange   Shoulder Adduction RUE:  4+/5   Internal Rotation RUE: 4/5   External Rotation RUE: 4-/5 unchanged   Elbow flexion/ ext RUE: 4+/5 / 4+/5   Shoulder Flexion LUE: 4+/5   Shoulder Extension LUE: 4+/5   Shoulder Abduction LUE: 3+/5   Shoulder Adduction LUE: 4+/5   Internal Rotation LUE:  4+/5   External Rotation LUE:  4-/5   Elbow flexion/ extension  4+/5  / 4-/5         Matrix equipment chest press with 10# resistance and horizontal rows with 35# resistance alternating with 2 sets of chest press and 3 sets of rows. Matrix Tricep curls with 50# resistance x 2 sets of 10 reps. Pt engaged in varinder scapular stabilization with step outs on wall x 2  sets of 10 steps. [place] ball on wall vertical step up and down with ER x 10 reps, increase muscle burning with performance.  Pt engaged in rebounder chest pass with 4# ball x 30 reps, unilateral right throwing 2# ball x 20 reps.     Ice applied to right shoulder x 8 min post therapy.    Patient is a 72 y.o. male referred to Occupational Therapy by  with a referral for eval and treat.  Patient received initial evaluation on 8/22/17 and returned for 9 treatment sessions. Patient had 1 missed visits. Patient's treatment included education re: shoulder structure and pathology, US, ice,  stretching, strengthening of upper quadrant, and Home ex program     Assessment:  Pt is demonstrating small changes in strength of right shoulder and overall decrease in shoulder pain. Pt has intermittent pain with heavy use and  Nightly.     Goals to be met in 8 weeks:  1) Pt will be independent and report performing HEP to maximize (R) shoulder functional capacity - progressing.  2) Pt will increase right shoulder strength shoulder to 4+/5 to engage in leisure tasks-not met  3) Pt will report use of home modalities for pain management-prn  4) Pt will report 0 out of 10 with use- not met at all times  G CODE TOOL: FOTO  Self care    Goal at Discharge Score 72 or 28% impaired  not met  Discharge status Score =  60 or 40 % impaired      Pt will continue to benefit from skilled OT intervention. Medical necessity is demonstrated by: Pt continues to be limited in functional and leisurely pursuits. Pain limits patients participation in ADL's. Pt is not able to carryout necessary vocational tasks. Patient continues to requires cues and skilled supervision to complete HEP    Plan:  Pt will be d/c from formal occupation therapy at this time. Pt will return to doctor for further medical assessment.

## 2017-11-09 ENCOUNTER — OFFICE VISIT (OUTPATIENT)
Dept: ORTHOPEDICS | Facility: CLINIC | Age: 72
End: 2017-11-09
Payer: MEDICARE

## 2017-11-09 VITALS
HEIGHT: 65 IN | DIASTOLIC BLOOD PRESSURE: 89 MMHG | RESPIRATION RATE: 18 BRPM | BODY MASS INDEX: 30.16 KG/M2 | WEIGHT: 181 LBS | SYSTOLIC BLOOD PRESSURE: 153 MMHG | HEART RATE: 72 BPM

## 2017-11-09 DIAGNOSIS — M75.100 TEAR OF ROTATOR CUFF, UNSPECIFIED LATERALITY, UNSPECIFIED TEAR EXTENT: Primary | ICD-10-CM

## 2017-11-09 DIAGNOSIS — M25.511 RIGHT SHOULDER PAIN, UNSPECIFIED CHRONICITY: ICD-10-CM

## 2017-11-09 PROCEDURE — 99999 PR PBB SHADOW E&M-EST. PATIENT-LVL III: CPT | Mod: PBBFAC,,, | Performed by: ORTHOPAEDIC SURGERY

## 2017-11-09 PROCEDURE — 99214 OFFICE O/P EST MOD 30 MIN: CPT | Mod: S$GLB,,, | Performed by: ORTHOPAEDIC SURGERY

## 2017-11-09 RX ORDER — AMOXICILLIN 500 MG
2 CAPSULE ORAL DAILY
Status: ON HOLD | COMMUNITY
End: 2018-01-03 | Stop reason: HOSPADM

## 2017-11-09 RX ORDER — DICLOFENAC SODIUM 10 MG/G
2 GEL TOPICAL DAILY
Qty: 1 TUBE | Refills: 2 | Status: CANCELLED | OUTPATIENT
Start: 2017-11-09

## 2017-11-09 NOTE — PROGRESS NOTES
CHIEF COMPLAINT: Right Shoulder pain.                                             HISTORY OF PRESENT ILLNESS:  The patient is a 72 y.o. male  who presents for evaluation of his right shoulder pain. This all started after picking up a butane tank and stepping off a curb funny, causing his arm to jerk. Last time he was here we prescribed PT/OT and had an MRI of his biceps. He has pain in his right shoulder when he sleeps and when he does overhead activities. He also sometimes feels a catching sensation when he brings his arm down. He goes fishing a lot and finds that his right arm tires more quickly than it used to. His pain is over the AC joint and posterior shoulder. He occasionally has pain over the distal end of his biceps. The pain is mostly constant dull, achy pain (5/10), but occasionally is sharp (8/10). Physical therapy has improved his strength, but he continues to have shoulder pain. He has been taking aleve for the past 3-4 weeks which has helped some at night. He has had an injection in his left shoulder, but not his left.    PAST MEDICAL HISTORY:   Past Medical History:   Diagnosis Date    Hypertension     Polymyalgia     Prostate cancer      PAST SURGICAL HISTORY:   Past Surgical History:   Procedure Laterality Date    BRAIN SURGERY  age 27    subarrachnoid brain hemorhage repair    CATARACT EXTRACTION      FINGER SURGERY  9-17-13    left TFR    HEMORRHOID SURGERY      SHOULDER SURGERY       FAMILY HISTORY:   Family History   Problem Relation Age of Onset    Anesthesia problems Neg Hx     Broken bones Neg Hx     Cancer Neg Hx     Clotting disorder Neg Hx     Collagen disease Neg Hx     Diabetes Neg Hx     Dislocations Neg Hx     Osteoporosis Neg Hx     Rheumatologic disease Neg Hx     Scoliosis Neg Hx     Severe sprains Neg Hx      SOCIAL HISTORY:   Social History     Social History    Marital status:      Spouse name: N/A    Number of children: N/A    Years of education:  "N/A     Occupational History    Not on file.     Social History Main Topics    Smoking status: Light Tobacco Smoker     Types: Cigars    Smokeless tobacco: Never Used      Comment: smokes a cigar once a month    Alcohol use No    Drug use: No    Sexual activity: Not on file     Other Topics Concern    Not on file     Social History Narrative    No narrative on file       MEDICATIONS:   Current Outpatient Prescriptions:     aspirin 81 mg Tab, Take 81 mg by mouth. 1 Tablet Oral Every day, Disp: , Rfl:     fish oil-omega-3 fatty acids 300-1,000 mg capsule, Take 2 g by mouth once daily., Disp: , Rfl:     multivitamin (THERAGRAN) per tablet, Take by mouth. 1 Tablet Oral Every day, Disp: , Rfl:     trandolapril (MAVIK) 4 MG Tab, , Disp: , Rfl:     verapamil (CALAN-SR) 240 MG CR tablet, Take 240 mg by mouth every evening., Disp: , Rfl:   ALLERGIES:   Review of patient's allergies indicates:   Allergen Reactions    Adhesive Rash    Latex, natural rubber Rash       VITAL SIGNS: BP (!) 153/89   Pulse 72   Resp 18   Ht 5' 5" (1.651 m)   Wt 82.1 kg (181 lb)   BMI 30.12 kg/m²      Review of Systems   Constitution: Negative for chills, fever, and weight loss.   HENT: Negative for congestion.   Cardiovascular: Negative for chest pain and dyspnea on exertion.   Respiratory: Negative for cough and shortness of breath.   Hematologic/Lymphatic: Does not bruise/bleed easily.   Skin: Negative for rash and suspicious lesions.   Musculoskeletal: see HPI  Gastrointestinal: Negative for bowel incontinence, constipation,diarrhea, vomiting.   Genitourinary: Negative for bladder incontinence.   Neurological: Negative for numbness, paresthesias and sensory change.       PHYSICAL EXAMINATION:  General:  The patient is alert and oriented x 3.  Mood is pleasant.  Observation of ears, eyes and nose reveal no gross abnormalities.  No labored breathing observed.  Gait is coordinated.       right Shoulder / Upper Extremity " Exam    OBSERVATION:     Swelling  none  Deformity  none   Discoloration  none   Scapular winging none   Scars   none  Atrophy  none    TENDERNESS / CREPITUS (T/C):  No tenderness or cerpitus        ROM: Full ROM, but pain with FF and abduction    STRENGTH:       RIGHT SHOULDER  LEFT SHOULDER   SCAPTION   5/5    5/5    IR    5/5    5/5   ER    5/5    5/5   BICEPS   5/5    5/5   Deltoid    5/5    5/5     SIGNS:  Right side      Positive Speed's   Negative Neer's    XRAYS:  X-rays reviewed    ASSESSMENT:   R shoulder pain    PLAN:  MRI right shoulder  F/u after MRI

## 2017-11-10 NOTE — PROGRESS NOTES
I have personally taken the history and examined this patient. I agree with the resident's note as stated above. Pt now has shoulder pain- pt has pain with motion. No pain at rest. Pt has 5/5 strength when examin ing rotator cuff muscles, pain with speeds but no impingement. Plan for MRI of shoulder

## 2017-11-15 ENCOUNTER — HOSPITAL ENCOUNTER (OUTPATIENT)
Dept: RADIOLOGY | Facility: HOSPITAL | Age: 72
Discharge: HOME | End: 2017-11-15
Attending: ORTHOPAEDIC SURGERY
Payer: MEDICARE

## 2017-11-15 DIAGNOSIS — M75.100 TEAR OF ROTATOR CUFF, UNSPECIFIED LATERALITY, UNSPECIFIED TEAR EXTENT: ICD-10-CM

## 2017-11-15 PROCEDURE — 73221 MRI JOINT UPR EXTREM W/O DYE: CPT | Mod: TC,RT

## 2017-11-15 PROCEDURE — 73221 MRI JOINT UPR EXTREM W/O DYE: CPT | Mod: 26,RT,, | Performed by: RADIOLOGY

## 2017-11-23 ENCOUNTER — PATIENT MESSAGE (OUTPATIENT)
Dept: ORTHOPEDICS | Facility: CLINIC | Age: 72
End: 2017-11-23

## 2017-12-12 ENCOUNTER — OFFICE VISIT (OUTPATIENT)
Dept: ORTHOPEDICS | Facility: CLINIC | Age: 72
End: 2017-12-12
Payer: MEDICARE

## 2017-12-12 VITALS
HEIGHT: 65 IN | WEIGHT: 181 LBS | SYSTOLIC BLOOD PRESSURE: 134 MMHG | RESPIRATION RATE: 18 BRPM | DIASTOLIC BLOOD PRESSURE: 83 MMHG | HEART RATE: 67 BPM | BODY MASS INDEX: 30.16 KG/M2

## 2017-12-12 DIAGNOSIS — M75.100 TEAR OF ROTATOR CUFF, UNSPECIFIED LATERALITY, UNSPECIFIED TEAR EXTENT: Primary | ICD-10-CM

## 2017-12-12 PROCEDURE — 99214 OFFICE O/P EST MOD 30 MIN: CPT | Mod: S$GLB,,, | Performed by: ORTHOPAEDIC SURGERY

## 2017-12-12 PROCEDURE — 99999 PR PBB SHADOW E&M-EST. PATIENT-LVL III: CPT | Mod: PBBFAC,,, | Performed by: ORTHOPAEDIC SURGERY

## 2017-12-12 NOTE — PROGRESS NOTES
The patient is here for MRI results. The patient does complain of continued shoulder pain and functional loss..    The MRI ispositive for RTC tear.    Plan:  Conservative versus surgical treatment was discussed. The patient is electing for surgery. The patient is not electing for conservative treatment.

## 2017-12-18 ENCOUNTER — INITIAL CONSULT (OUTPATIENT)
Dept: INTERNAL MEDICINE | Facility: CLINIC | Age: 72
End: 2017-12-18
Payer: MEDICARE

## 2017-12-18 VITALS
HEIGHT: 65 IN | HEART RATE: 71 BPM | OXYGEN SATURATION: 97 % | DIASTOLIC BLOOD PRESSURE: 82 MMHG | WEIGHT: 187.5 LBS | SYSTOLIC BLOOD PRESSURE: 137 MMHG | TEMPERATURE: 98 F | BODY MASS INDEX: 31.24 KG/M2

## 2017-12-18 DIAGNOSIS — R06.83 SNORING: ICD-10-CM

## 2017-12-18 DIAGNOSIS — I60.9 SUBARACHNOID HEMORRHAGE: ICD-10-CM

## 2017-12-18 DIAGNOSIS — C61 PROSTATE CANCER: ICD-10-CM

## 2017-12-18 DIAGNOSIS — I10 HYPERTENSION, UNSPECIFIED TYPE: ICD-10-CM

## 2017-12-18 DIAGNOSIS — Z72.0 TOBACCO USE: ICD-10-CM

## 2017-12-18 DIAGNOSIS — M35.3 POLYMYALGIA RHEUMATICA: ICD-10-CM

## 2017-12-18 DIAGNOSIS — Z01.818 PREOP EXAMINATION: Primary | ICD-10-CM

## 2017-12-18 DIAGNOSIS — R60.9 EDEMA, UNSPECIFIED TYPE: ICD-10-CM

## 2017-12-18 DIAGNOSIS — E66.9 CLASS 1 OBESITY WITH BODY MASS INDEX (BMI) OF 31.0 TO 31.9 IN ADULT, UNSPECIFIED OBESITY TYPE, UNSPECIFIED WHETHER SERIOUS COMORBIDITY PRESENT: ICD-10-CM

## 2017-12-18 DIAGNOSIS — M75.101 TEAR OF RIGHT ROTATOR CUFF, UNSPECIFIED TEAR EXTENT: Primary | ICD-10-CM

## 2017-12-18 DIAGNOSIS — R01.1 CARDIAC MURMUR: ICD-10-CM

## 2017-12-18 PROBLEM — E66.811 CLASS 1 OBESITY WITH BODY MASS INDEX (BMI) OF 31.0 TO 31.9 IN ADULT: Status: ACTIVE | Noted: 2017-12-18

## 2017-12-18 PROCEDURE — 99999 PR PBB SHADOW E&M-EST. PATIENT-LVL IV: CPT | Mod: PBBFAC,,, | Performed by: HOSPITALIST

## 2017-12-18 PROCEDURE — 99204 OFFICE O/P NEW MOD 45 MIN: CPT | Mod: S$GLB,,, | Performed by: HOSPITALIST

## 2017-12-18 RX ORDER — DICLOFENAC SODIUM 10 MG/G
GEL TOPICAL DAILY PRN
Status: ON HOLD | COMMUNITY
Start: 2017-11-09 | End: 2018-01-03 | Stop reason: HOSPADM

## 2017-12-18 RX ORDER — CHOLECALCIFEROL (VITAMIN D3) 125 MCG
CAPSULE ORAL NIGHTLY PRN
Status: ON HOLD | COMMUNITY
End: 2018-01-03 | Stop reason: HOSPADM

## 2017-12-18 NOTE — LETTER
December 18, 2017      Christine Simpson MD  [address]  [address]  [facility] [place] [address]           [facility] - Pre Op Consult  [address] [facility] [address]  Phone: [phone]          Patient: Sharita Gonzalez   MR Number: [id number]   YOB: 1945   Date of Visit: 12/18/2017       Dear Dr. Christine Simpson:    Thank you for referring Sharita Gonzalez to me for evaluation. Attached you will find relevant portions of my assessment and plan of care.    If you have questions, please do not hesitate to call me. I look forward to following Sharita Gonzalez along with you.    Sincerely,    Radha Clark MD    Enclosure  CC:  MD Jd Rayo MD Heather Michelle Taillac, MD    If you would like to receive this communication electronically, please contact [email or web address] or [phone] to request more information on [employer] access.    For providers and/or their staff who would like to refer a patient to [facility], please contact us through our one-stop-shop provider referral line, [facility], at [phone].    If you feel you have received this communication in error or would no longer like to receive these types of communications, please e-mail [email or web address]

## 2017-12-18 NOTE — HPI
History of present illness- I had the pleasure of meeting this pleasant 72 y.o. gentleman in the pre op clinic prior to his elective Orthopedic surgery. The patient is new to me .     I have obtained the history by speaking to the patient and by reviewing the electronic health records.    Events leading up to surgery / History of presenting illness -    He has been troubled with moderate-severe  Rt shoulder  pain for about 7 months . Pain increases with lifting arm up  and activity and decreases with resting.   This all started after picking up a butane tank and stepping off a curb funny, causing his arm to jerk   Had a similar incident when had biceps injury 5-6 years ago     Relevant health conditions of significance for the perioperative period/ History of presenting illness -    Subjectively describes health as pretty good   Stays active     Hypertension ,On medication  Home  machine readings -  120-130/80's    Polymyalgia rheumatica  History of PMR 2012 that went into remission. Symptoms reoccurred 2015 and pred restarted   Took Prednisone for 1.5 years   Went into remission earlier earlier 2017      Obesity   Lost 12 pounds since off of Steroid 4 months ago    ASA- prevention    Not known to have heart disease , Diabetes Mellitus, Lung disease , Liver disease

## 2017-12-18 NOTE — OUTPATIENT SUBJECTIVE & OBJECTIVE
Outpatient Subjective & Objective     Chief complaint-Preoperative evaluation, Perioperative Medical management, complication reduction plan     Active cardiac conditions- none    Revised cardiac risk index predictors- none    Functional capacity -Examples of physical activity, lives in a raised house , 9 feet off the ground , has 12 steps to get up, does walking with his cat ,  can take 1 flight of stairs and cutting the grass with a mower----- He can undertake all the above activities without  chest pain,chest tightness, Shortness of breath ,dizziness,lightheadedness making his exercise tolerance more  than 4 Mets.       Review of Systems   Constitutional: Negative for chills and fever.   HENT:        Snoring   Witnessed stopping of breathing   Elevated BP  Age over 50 years  Neck size over 40 CM  Male gender   Eyes:        No unusual vision changes   Respiratory:        Cough   Clear Phlegm   Going through sinus problem   Does not feel sick   No fever   No hemoptysis    Cardiovascular:        As noted   Gastrointestinal:        Bowels- Regular   No overt GI/ blood losses   Endocrine:        Recent steroid use, prednisone- None   Genitourinary: Negative for dysuria.        Urinary frequency- drinks water before going to bed    Musculoskeletal:        As above      Skin: Negative for rash.   Neurological: Negative for syncope.        No unilateral weakness   Hematological:        Current use of Anticoagulants- none   Psychiatric/Behavioral:        No Depression  Anxiety     No vascular stenting   No past medical history pertinent negatives.    Past Surgical History:   Procedure Laterality Date    BRAIN SURGERY  age 27    subarrachnoid brain hemorhage repair    CATARACT EXTRACTION      FINGER SURGERY  9-17-13    left TFR    HEMORRHOID SURGERY      SHOULDER SURGERY         No anesthesia, bleeding, cardiac problems, PONV with previous surgeries/procedures.  Medications and Allergies reviewed in epic.   FH- No  "anesthesia, thrombosis  , early onset heart disease in family   Lives with wife , help is available     Physical Exam  Blood pressure 137/82, pulse 71, temperature 97.7 °F (36.5 °C), temperature source Oral, height 5' 5" (1.651 m), weight 85 kg (187 lb 8 oz), SpO2 97 %.    Physical Exam  Constitutional- Vitals - Body mass index is 31.2 kg/m².,   Vitals:    12/18/17 0851   BP: 137/82   Pulse: 71   Temp: 97.7 °F (36.5 °C)     General appearance-Conscious,Coherent  Eyes- No conjunctival icterus,pupils  round  and  bilateral intra ocular lenses  ENT-Oral cavity- moist  , Hearing grossly normal   Neck- No thyromegaly ,Trachea -central, No jugular venous distension,   No Carotid Bruit   Cardiovascular -Heart Sounds-Sitting - Aortic murmur, Reclining- aortic,Pulmonary,  Normal ,  systolic murmur aortic area  and  systolic murmur pulmonary area   , No gallop rhythm   Respiratory - Normal Respiratory Effort, Normal breath sounds, crepitations bases,  no wheeze  and  no forced expiratory wheeze  - follow up suggested  Peripheral pitting pedal edema-- mild and  bilateral lower extremity telangiectasia , no calf pain   Gastrointestinal -Soft abdomen, No palpable masses, Non Tender,Liver,Spleen not palpable. No-- free fluid and shifting dullness  Musculoskeletal- No finger Clubbing. Strength grossly normal   Lymphatic-No Palpable cervical, axillary,Inguinal lymphadenopathy   Psychiatric - normal effect,Orientation  Rt Dorsalis pedis pulses-palpable    Lt Dorsalis pedis pulses- palpable   Rt Posterior tibial pulses -palpable   Left posterior tibial pulses -palpable   Miscellaneous -  no asterixis,  no dupuytren's contracture and  no renal bruit    Investigations  Lab and Imaging have been reviewed in [facility].    Review of Medicine tests    EKG- I had independently reviewed the EKG from--3/28/2017   It was reported to be showing     Normal sinus rhythm  Normal ECG    Review of clinical lab tests-Date---6/26/2017  " Creatinine-1.2  Date--3/2/2016Hemoglobin-N- Platelet count--n   Discussed with patient about labs-deferred at this time      Review of old records- Was done and information gathered regards to events leading to surgery and health conditions of significance in the perioperative period.    Outpatient Subjective & Objective

## 2017-12-18 NOTE — ASSESSMENT & PLAN NOTE
No suggestion of severe valve disease based on symptoms, auscultation   Suggested looking out of exertional Chest pain, SOB, Passing out   Offered Cardiac US that  He deferred for now

## 2017-12-18 NOTE — PATIENT INSTRUCTIONS
Your surgery has been scheduled for:___Wed 1/3___     You should report to:  __X__[facility], located on the [place] side of the first floor of the [facility] ([phone])  ______[facility], located on the [facility] side of the Second floor of the [facility] ([phone])  ______3rd Floor [facility] located on the [facility] side of the [facility] [phone]    Please Note  -Tell your doctors if you take asprin, products containing aspirin, herbal medications or blood thinners, such as Coumadin, Ticlid, or Plavix. (Consult your provider regarding holding or stopping before surgery).  -Arrange for someone to drive you home following surgery. You will not be allowed to leave the surgical facility alone or drive yourself home following sedation and anesthesia.      Outpatient Encounter Prescriptions as of 12/18/2017   Medication Sig Note Dispense Refill    aspirin 81 mg Tab Take 81 mg by mouth. 1 Tablet Oral Every day 12/18/2017: Hold 1 wk prior to surgery      fish oil-omega-3 fatty acids 300-1,000 mg capsule Take 2 g by mouth once daily. 12/18/2017: Hold 1 wk prior to surgery      multivitamin (THERAGRAN) per tablet Take by mouth. 1 Tablet Oral Every day 12/18/2017: Hold 1 wk prior to surgery      naproxen sodium (ALEVE) 220 mg Cap Take by mouth nightly as needed. 12/18/2017: Hold 1 wk prior to surgery      trandolapril (MAVIK) 4 MG Tab 4 mg every morning.  12/18/2017: Hold and bring AM of surgery      verapamil (CALAN-SR) 240 MG CR tablet Take 240 mg by mouth every morning.  12/18/2017: Take AM of surgery      VOLTAREN 1 % Gel daily as needed. 12/18/2017: Hold 1 wk prior to surgery       No facility-administered encounter medications on file as of 12/18/2017.          Please review the instructions regarding your above listed medications.    Before Surgery  -Stop taking all herbal medications 14 days prior to  surgery  -Stop taking asprin, products containing asprin 7 days before surgery  -Stop taking blood thinners   days before surgery  -Refrain from drinking alcoholic beverages for 24 hours before and after surgery  -Stop or limit smoking   days before surgery    Night before Surgery  -DO NOT EAT OR DRINK ANYTHING AFTER MIDNIGHT, INCLUDING GUM, HARD CANDY, MINTS, OR CHEWING TOBACCO  -Take a shower or bath (shower is recommended). Bathe with Hibiciens soap or an antibacterial soap from the neck down. If not supplied by your surgeon, Hibiciens soap will need to be purchased over the counter in the pharmacy. Rinse soap off thoroughly.  -Shampoo your hair with your regular shampoo    The Day of Surgery  -Take another bath or shower with Hibiciens or any antibacterial soap, to reduce the chance of infection.  -Take heart and blood pressure medications with a small sip of water, as advised by the perioperative team.  -Do not take fluid pills  -You may brush your teeth and rinse your mouth, but do not swallow any additional water.  -Do not apply perfumes, powder, body lotions, or deodorant on the day of surgery.  -Nail polish should be removed  -Do not wear makeup or moisturizer  -Wear comfortable clothes, such as a button front shirt and loose fitting pants.  -Leave all jewelry, including body piercings, and valuables at home.  -Bring any devices you will need after surgery such as crutches or canes.  -If you have sleep apnea, please bring your CPAP machine    In the event of your physical conditions including the onset of a cold or respiratory illness, or if you have to delay or cancel your surgery, please notify your surgeon.     If you have any questions or concerns, please don't hesitate to call.    Sincerely,    Leilani [phone]

## 2017-12-18 NOTE — ASSESSMENT & PLAN NOTE
Tobacco use-I  Informed about risk of wound healing problem ,infection,lung complications,thrombosis with tobacco use   I suggested to consider stopping  smoking tobacco for its benefits in the narcisa operative period and in the long term

## 2017-12-18 NOTE — ASSESSMENT & PLAN NOTE
Possible sleep apnea- I suggested a sleep study and suggest caution with usage of medication that can cause respiratory suppression in the perioperative period  potential ramifications of untreated sleep apnea, which could include daytime sleepiness, hypertension, heart disease and stroke were discussed

## 2017-12-18 NOTE — ASSESSMENT & PLAN NOTE
Edema- I suggested avoidance of added salt,avoidance of NSAID's, except ASA and suggested Limb elevation and clyde hose use

## 2017-12-18 NOTE — ASSESSMENT & PLAN NOTE
At age 27 years , while weeding his garden ( Not strenuous )   No injury   Resolved spontaneously ( no clipping , coiling , aneurysm history )

## 2017-12-18 NOTE — ASSESSMENT & PLAN NOTE
Aged about 60's   Had Brachy therapy   subjectively feel like he is emptying well   No rectal/ urinary bleeding

## 2018-01-02 ENCOUNTER — TELEPHONE (OUTPATIENT)
Dept: ORTHOPEDICS | Facility: CLINIC | Age: 73
End: 2018-01-02

## 2018-01-02 NOTE — TELEPHONE ENCOUNTER
Sharita Gonzalez's wife notified of arrival time 1030 for surgery on 1/3/18 with Dr. LUZ Simpson. At [facility]. Post Op appointment made, slip in mail.

## 2018-01-03 ENCOUNTER — ANESTHESIA EVENT (OUTPATIENT)
Dept: SURGERY | Facility: HOSPITAL | Age: 73
End: 2018-01-03
Payer: MEDICARE

## 2018-01-03 ENCOUNTER — HOSPITAL ENCOUNTER (OUTPATIENT)
Facility: HOSPITAL | Age: 73
Discharge: HOME | End: 2018-01-03
Attending: ORTHOPAEDIC SURGERY | Admitting: ORTHOPAEDIC SURGERY
Payer: MEDICARE

## 2018-01-03 ENCOUNTER — ANESTHESIA (OUTPATIENT)
Dept: SURGERY | Facility: HOSPITAL | Age: 73
End: 2018-01-03
Payer: MEDICARE

## 2018-01-03 VITALS
HEART RATE: 78 BPM | BODY MASS INDEX: 30.16 KG/M2 | SYSTOLIC BLOOD PRESSURE: 118 MMHG | RESPIRATION RATE: 16 BRPM | OXYGEN SATURATION: 99 % | TEMPERATURE: 98 F | HEIGHT: 65 IN | DIASTOLIC BLOOD PRESSURE: 67 MMHG | WEIGHT: 181 LBS

## 2018-01-03 DIAGNOSIS — M12.811 ROTATOR CUFF ARTHROPATHY, RIGHT: Primary | ICD-10-CM

## 2018-01-03 PROCEDURE — 63600175 PHARM REV CODE 636 W HCPCS

## 2018-01-03 PROCEDURE — 25000003 PHARM REV CODE 250: Performed by: ORTHOPAEDIC SURGERY

## 2018-01-03 PROCEDURE — 63600175 PHARM REV CODE 636 W HCPCS: Performed by: STUDENT IN AN ORGANIZED HEALTH CARE EDUCATION/TRAINING PROGRAM

## 2018-01-03 PROCEDURE — 63600175 PHARM REV CODE 636 W HCPCS: Performed by: ANESTHESIOLOGY

## 2018-01-03 PROCEDURE — 25000003 PHARM REV CODE 250: Performed by: NURSE ANESTHETIST, CERTIFIED REGISTERED

## 2018-01-03 PROCEDURE — 36000710: Performed by: ORTHOPAEDIC SURGERY

## 2018-01-03 PROCEDURE — 29827 SHO ARTHRS SRG RT8TR CUF RPR: CPT | Mod: RT,,, | Performed by: ORTHOPAEDIC SURGERY

## 2018-01-03 PROCEDURE — C1713 ANCHOR/SCREW BN/BN,TIS/BN: HCPCS | Performed by: ORTHOPAEDIC SURGERY

## 2018-01-03 PROCEDURE — 63600175 PHARM REV CODE 636 W HCPCS: Performed by: NURSE ANESTHETIST, CERTIFIED REGISTERED

## 2018-01-03 PROCEDURE — S0077 INJECTION, CLINDAMYCIN PHOSP: HCPCS | Performed by: NURSE ANESTHETIST, CERTIFIED REGISTERED

## 2018-01-03 PROCEDURE — 27201423 OPTIME MED/SURG SUP & DEVICES STERILE SUPPLY: Performed by: ORTHOPAEDIC SURGERY

## 2018-01-03 PROCEDURE — 71000015 HC POSTOP RECOV 1ST HR: Performed by: ORTHOPAEDIC SURGERY

## 2018-01-03 PROCEDURE — 37000009 HC ANESTHESIA EA ADD 15 MINS: Performed by: ORTHOPAEDIC SURGERY

## 2018-01-03 PROCEDURE — 76942 ECHO GUIDE FOR BIOPSY: CPT | Mod: 26,,, | Performed by: ANESTHESIOLOGY

## 2018-01-03 PROCEDURE — D9220A PRA ANESTHESIA: Mod: ANES,,, | Performed by: ANESTHESIOLOGY

## 2018-01-03 PROCEDURE — 37000008 HC ANESTHESIA 1ST 15 MINUTES: Performed by: ORTHOPAEDIC SURGERY

## 2018-01-03 PROCEDURE — 71000044 HC DOSC ROUTINE RECOVERY FIRST HOUR: Performed by: ORTHOPAEDIC SURGERY

## 2018-01-03 PROCEDURE — D9220A PRA ANESTHESIA: Mod: CRNA,,, | Performed by: NURSE ANESTHETIST, CERTIFIED REGISTERED

## 2018-01-03 PROCEDURE — 71000016 HC POSTOP RECOV ADDL HR: Performed by: ORTHOPAEDIC SURGERY

## 2018-01-03 PROCEDURE — 63600175 PHARM REV CODE 636 W HCPCS: Performed by: ORTHOPAEDIC SURGERY

## 2018-01-03 PROCEDURE — 27800517 HC TRAY,EPIDURAL-CONTINUOUS: Performed by: ANESTHESIOLOGY

## 2018-01-03 PROCEDURE — 71000045 HC DOSC ROUTINE RECOVERY EA ADD'L HR: Performed by: ORTHOPAEDIC SURGERY

## 2018-01-03 PROCEDURE — 64416 NJX AA&/STRD BRCH PL NFS IMG: CPT | Performed by: ANESTHESIOLOGY

## 2018-01-03 PROCEDURE — 36000711: Performed by: ORTHOPAEDIC SURGERY

## 2018-01-03 DEVICE — ALLOPATCH HD THCK 4 X 8: Type: IMPLANTABLE DEVICE | Site: SHOULDER | Status: FUNCTIONAL

## 2018-01-03 DEVICE — ANCHOR SUT BC CRKSCR 4.5X14MM: Type: IMPLANTABLE DEVICE | Site: SHOULDER | Status: FUNCTIONAL

## 2018-01-03 DEVICE — ANCHOR SUT BIOCOM 5.5X19.1MM: Type: IMPLANTABLE DEVICE | Site: SHOULDER | Status: FUNCTIONAL

## 2018-01-03 RX ORDER — BACITRACIN 500 [USP'U]/G
OINTMENT TOPICAL
Status: DISCONTINUED
Start: 2018-01-03 | End: 2018-01-03 | Stop reason: HOSPADM

## 2018-01-03 RX ORDER — MIDAZOLAM HYDROCHLORIDE 1 MG/ML
0.5 INJECTION INTRAMUSCULAR; INTRAVENOUS EVERY 5 MIN PRN
Status: DISCONTINUED | OUTPATIENT
Start: 2018-01-03 | End: 2018-01-03 | Stop reason: HOSPADM

## 2018-01-03 RX ORDER — ONDANSETRON 2 MG/ML
4 INJECTION INTRAMUSCULAR; INTRAVENOUS DAILY PRN
Status: DISCONTINUED | OUTPATIENT
Start: 2018-01-03 | End: 2018-01-03 | Stop reason: HOSPADM

## 2018-01-03 RX ORDER — SODIUM CHLORIDE 0.9 % (FLUSH) 0.9 %
3 SYRINGE (ML) INJECTION
Status: DISCONTINUED | OUTPATIENT
Start: 2018-01-03 | End: 2018-01-03 | Stop reason: HOSPADM

## 2018-01-03 RX ORDER — ROCURONIUM BROMIDE 10 MG/ML
INJECTION, SOLUTION INTRAVENOUS
Status: DISCONTINUED | OUTPATIENT
Start: 2018-01-03 | End: 2018-01-03

## 2018-01-03 RX ORDER — OXYCODONE AND ACETAMINOPHEN 10; 325 MG/1; MG/1
1 TABLET ORAL EVERY 4 HOURS PRN
Status: DISCONTINUED | OUTPATIENT
Start: 2018-01-03 | End: 2018-01-03 | Stop reason: HOSPADM

## 2018-01-03 RX ORDER — MIDAZOLAM HYDROCHLORIDE 1 MG/ML
INJECTION INTRAMUSCULAR; INTRAVENOUS
Status: DISCONTINUED | OUTPATIENT
Start: 2018-01-03 | End: 2018-01-03

## 2018-01-03 RX ORDER — BACITRACIN ZINC 500 UNIT/G
OINTMENT (GRAM) TOPICAL
Status: DISCONTINUED | OUTPATIENT
Start: 2018-01-03 | End: 2018-01-03 | Stop reason: HOSPADM

## 2018-01-03 RX ORDER — FENTANYL CITRATE 50 UG/ML
25 INJECTION, SOLUTION INTRAMUSCULAR; INTRAVENOUS EVERY 5 MIN PRN
Status: DISCONTINUED | OUTPATIENT
Start: 2018-01-03 | End: 2018-01-03 | Stop reason: HOSPADM

## 2018-01-03 RX ORDER — PHENYLEPHRINE HYDROCHLORIDE 10 MG/ML
INJECTION INTRAVENOUS
Status: DISCONTINUED | OUTPATIENT
Start: 2018-01-03 | End: 2018-01-03

## 2018-01-03 RX ORDER — GLYCOPYRROLATE 0.2 MG/ML
INJECTION INTRAMUSCULAR; INTRAVENOUS
Status: DISCONTINUED | OUTPATIENT
Start: 2018-01-03 | End: 2018-01-03

## 2018-01-03 RX ORDER — NEOSTIGMINE METHYLSULFATE 1 MG/ML
INJECTION, SOLUTION INTRAVENOUS
Status: DISCONTINUED | OUTPATIENT
Start: 2018-01-03 | End: 2018-01-03

## 2018-01-03 RX ORDER — HYDROMORPHONE HYDROCHLORIDE 2 MG/ML
0.2 INJECTION, SOLUTION INTRAMUSCULAR; INTRAVENOUS; SUBCUTANEOUS EVERY 5 MIN PRN
Status: DISCONTINUED | OUTPATIENT
Start: 2018-01-03 | End: 2018-01-03 | Stop reason: HOSPADM

## 2018-01-03 RX ORDER — OXYCODONE AND ACETAMINOPHEN 10; 325 MG/1; MG/1
1 TABLET ORAL EVERY 4 HOURS PRN
Qty: 90 TABLET | Refills: 0 | Status: SHIPPED | OUTPATIENT
Start: 2018-01-03 | End: 2018-01-19

## 2018-01-03 RX ORDER — FENTANYL CITRATE 50 UG/ML
INJECTION, SOLUTION INTRAMUSCULAR; INTRAVENOUS
Status: DISCONTINUED | OUTPATIENT
Start: 2018-01-03 | End: 2018-01-03

## 2018-01-03 RX ORDER — FENTANYL CITRATE 50 UG/ML
INJECTION, SOLUTION INTRAMUSCULAR; INTRAVENOUS
Status: COMPLETED
Start: 2018-01-03 | End: 2018-01-03

## 2018-01-03 RX ORDER — LIDOCAINE HCL/PF 100 MG/5ML
SYRINGE (ML) INTRAVENOUS
Status: DISCONTINUED | OUTPATIENT
Start: 2018-01-03 | End: 2018-01-03

## 2018-01-03 RX ORDER — CEFAZOLIN SODIUM 1 G/3ML
2 INJECTION, POWDER, FOR SOLUTION INTRAMUSCULAR; INTRAVENOUS
Status: DISCONTINUED | OUTPATIENT
Start: 2018-01-03 | End: 2018-01-03 | Stop reason: HOSPADM

## 2018-01-03 RX ORDER — EPINEPHRINE 1 MG/ML
INJECTION INTRAMUSCULAR; INTRAVENOUS; SUBCUTANEOUS
Status: DISCONTINUED
Start: 2018-01-03 | End: 2018-01-03 | Stop reason: HOSPADM

## 2018-01-03 RX ORDER — MIDAZOLAM HYDROCHLORIDE 1 MG/ML
INJECTION INTRAMUSCULAR; INTRAVENOUS
Status: COMPLETED
Start: 2018-01-03 | End: 2018-01-03

## 2018-01-03 RX ORDER — ONDANSETRON HYDROCHLORIDE 8 MG/1
8 TABLET, FILM COATED ORAL EVERY 8 HOURS PRN
Qty: 30 TABLET | Refills: 0 | Status: SHIPPED | OUTPATIENT
Start: 2018-01-03 | End: 2018-03-28

## 2018-01-03 RX ORDER — ONDANSETRON HYDROCHLORIDE 2 MG/ML
INJECTION, SOLUTION INTRAMUSCULAR; INTRAVENOUS
Status: DISCONTINUED | OUTPATIENT
Start: 2018-01-03 | End: 2018-01-03

## 2018-01-03 RX ORDER — EPINEPHRINE 1 MG/ML
INJECTION, SOLUTION INTRACARDIAC; INTRAMUSCULAR; INTRAVENOUS; SUBCUTANEOUS
Status: DISCONTINUED | OUTPATIENT
Start: 2018-01-03 | End: 2018-01-03 | Stop reason: HOSPADM

## 2018-01-03 RX ORDER — PROPOFOL 10 MG/ML
VIAL (ML) INTRAVENOUS
Status: DISCONTINUED | OUTPATIENT
Start: 2018-01-03 | End: 2018-01-03

## 2018-01-03 RX ORDER — SODIUM CHLORIDE 9 MG/ML
INJECTION, SOLUTION INTRAVENOUS CONTINUOUS PRN
Status: DISCONTINUED | OUTPATIENT
Start: 2018-01-03 | End: 2018-01-03

## 2018-01-03 RX ORDER — VASOPRESSIN 20 [USP'U]/ML
INJECTION, SOLUTION INTRAMUSCULAR; SUBCUTANEOUS
Status: DISCONTINUED | OUTPATIENT
Start: 2018-01-03 | End: 2018-01-03

## 2018-01-03 RX ORDER — ASPIRIN 325 MG
325 TABLET, DELAYED RELEASE (ENTERIC COATED) ORAL 2 TIMES DAILY
Qty: 60 TABLET | Refills: 0 | Status: SHIPPED | OUTPATIENT
Start: 2018-01-03 | End: 2018-01-03 | Stop reason: HOSPADM

## 2018-01-03 RX ORDER — CLINDAMYCIN PHOSPHATE 900 MG/50ML
INJECTION, SOLUTION INTRAVENOUS
Status: DISCONTINUED | OUTPATIENT
Start: 2018-01-03 | End: 2018-01-03

## 2018-01-03 RX ORDER — DOCUSATE SODIUM 100 MG/1
100 CAPSULE, LIQUID FILLED ORAL 2 TIMES DAILY
Qty: 60 CAPSULE | Refills: 0 | Status: SHIPPED | OUTPATIENT
Start: 2018-01-03 | End: 2018-03-28

## 2018-01-03 RX ADMIN — PHENYLEPHRINE HYDROCHLORIDE 200 MCG: 10 INJECTION INTRAVENOUS at 01:01

## 2018-01-03 RX ADMIN — FENTANYL CITRATE 100 MCG: 50 INJECTION, SOLUTION INTRAMUSCULAR; INTRAVENOUS at 12:01

## 2018-01-03 RX ADMIN — PROPOFOL 150 MG: 10 INJECTION, EMULSION INTRAVENOUS at 01:01

## 2018-01-03 RX ADMIN — VASOPRESSIN 2 UNITS: 20 INJECTION INTRAVENOUS at 01:01

## 2018-01-03 RX ADMIN — FENTANYL CITRATE 25 MCG: 50 INJECTION, SOLUTION INTRAMUSCULAR; INTRAVENOUS at 03:01

## 2018-01-03 RX ADMIN — CEFAZOLIN 2 G: 330 INJECTION, POWDER, FOR SOLUTION INTRAMUSCULAR; INTRAVENOUS at 01:01

## 2018-01-03 RX ADMIN — MIDAZOLAM HYDROCHLORIDE 2 MG: 1 INJECTION, SOLUTION INTRAMUSCULAR; INTRAVENOUS at 01:01

## 2018-01-03 RX ADMIN — GLYCOPYRROLATE 0.6 MG: 0.2 INJECTION, SOLUTION INTRAMUSCULAR; INTRAVENOUS at 03:01

## 2018-01-03 RX ADMIN — NEOSTIGMINE METHYLSULFATE 5 MG: 1 INJECTION INTRAVENOUS at 03:01

## 2018-01-03 RX ADMIN — ROCURONIUM BROMIDE 10 MG: 10 INJECTION, SOLUTION INTRAVENOUS at 01:01

## 2018-01-03 RX ADMIN — VASOPRESSIN 7 UNITS: 20 INJECTION INTRAVENOUS at 01:01

## 2018-01-03 RX ADMIN — CLINDAMYCIN PHOSPHATE 900 MG: 18 INJECTION, SOLUTION INTRAVENOUS at 01:01

## 2018-01-03 RX ADMIN — ONDANSETRON 4 MG: 2 INJECTION, SOLUTION INTRAMUSCULAR; INTRAVENOUS at 02:01

## 2018-01-03 RX ADMIN — SODIUM CHLORIDE, SODIUM GLUCONATE, SODIUM ACETATE, POTASSIUM CHLORIDE, MAGNESIUM CHLORIDE, SODIUM PHOSPHATE, DIBASIC, AND POTASSIUM PHOSPHATE: .53; .5; .37; .037; .03; .012; .00082 INJECTION, SOLUTION INTRAVENOUS at 01:01

## 2018-01-03 RX ADMIN — SODIUM CHLORIDE: 0.9 INJECTION, SOLUTION INTRAVENOUS at 01:01

## 2018-01-03 RX ADMIN — ROCURONIUM BROMIDE 40 MG: 10 INJECTION, SOLUTION INTRAVENOUS at 01:01

## 2018-01-03 RX ADMIN — ROPIVACAINE HYDROCHLORIDE: 2 INJECTION, SOLUTION EPIDURAL; INFILTRATION at 04:01

## 2018-01-03 RX ADMIN — GLYCOPYRROLATE 0.2 MG: 0.2 INJECTION, SOLUTION INTRAMUSCULAR; INTRAVENOUS at 01:01

## 2018-01-03 RX ADMIN — VASOPRESSIN 3 UNITS: 20 INJECTION INTRAVENOUS at 01:01

## 2018-01-03 RX ADMIN — EPHEDRINE SULFATE 25 MG: 50 INJECTION, SOLUTION INTRAMUSCULAR; INTRAVENOUS; SUBCUTANEOUS at 01:01

## 2018-01-03 RX ADMIN — FENTANYL CITRATE 50 MCG: 50 INJECTION, SOLUTION INTRAMUSCULAR; INTRAVENOUS at 01:01

## 2018-01-03 RX ADMIN — LIDOCAINE HYDROCHLORIDE 100 MG: 20 INJECTION, SOLUTION INTRAVENOUS at 01:01

## 2018-01-03 RX ADMIN — MIDAZOLAM HYDROCHLORIDE 2 MG: 1 INJECTION, SOLUTION INTRAMUSCULAR; INTRAVENOUS at 12:01

## 2018-01-03 RX ADMIN — VASOPRESSIN 5 UNITS: 20 INJECTION INTRAVENOUS at 01:01

## 2018-01-03 NOTE — ANESTHESIA PROCEDURE NOTES
Interscalene Brachial Plexus Catheter    Patient location during procedure: pre-op   Block not for primary anesthetic.  Reason for block: at surgeon's request and post-op pain management   Post-op Pain Location: right rotator cuff tear  Start time: 1/3/2018 12:23 PM  Timeout: 1/3/2018 12:22 PM   End time: 1/3/2018 12:48 PM  Staffing  Anesthesiologist: REKHA JUAREZ  Resident/CRNA: EMMA BRIONES  Performed: anesthesiologist and resident/CRNA   Preanesthetic Checklist  Completed: patient identified, site marked, surgical consent, pre-op evaluation, timeout performed, IV checked, risks and benefits discussed and monitors and equipment checked  Peripheral Block  Patient position: sitting  Prep: ChloraPrep and site prepped and draped  Patient monitoring: heart rate, cardiac monitor, continuous pulse ox, continuous capnometry and frequent blood pressure checks  Block type: interscalene  Laterality: right  Injection technique: continuous  Needle  Needle type: Tuohy   Needle gauge: 18 G  Needle length: 2 in  Needle localization: anatomical landmarks and ultrasound guidance  Catheter type: non-stimulating  Catheter size: 20 G  Catheter at skin depth: 5 cm  Test dose: lidocaine 1.5% with Epi 1-to-200,000 and negative   -ultrasound image captured on disc.  Assessment  Injection assessment: negative aspiration, negative parasthesia and local visualized surrounding nerve  Paresthesia pain: none  Heart rate change: no  Slow fractionated injection: yes  Medications:  Bolus administered: 30 mL of 0.25 ropivacaine  Epinephrine added: 3.75 mcg/mL (1/300,000)  Additional Notes  VSS.  [facility] RN monitoring vitals throughout procedure.  Patient tolerated procedure well.

## 2018-01-03 NOTE — DISCHARGE INSTRUCTIONS
· After surgery, rest your arm and relax for the rest of day.  · If you had general anesthesia (were put to sleep for the procedure), dont operate power tools or machinery, drink alcohol, or make any major decisions for at least 24 hours after surgery.  · Wear your sling, brace, or immobilizer, as directed.  · Dont drive a car until your doctor says its OK. And never drive while taking opioid pain medicine.  · Flex your wrist and wiggle your fingers often to help blood flow.  ·   Incision care  · Check your incision daily for redness, tenderness, or drainage.  · Dont soak in a bathtub, hot tub, or pool until your doctor says its OK.  · Your incision was closed using sutures, staples, or strips of tape. If you have sutures or staples, they may need to be removed up to 2 to 3 weeks after surgery. Allow the strips of tape to fall off on their own.  Home care  · Use pain medicine as directed by your doctor.  · Apply an ice pack or bag of frozen peas--or something similar--wrapped in a thin towel on your shoulder to reduce swelling for the first 48 hours. Leave the ice pack on for 20 minutes; then take it off for 20 minutes. Repeat as needed.  · Take your temperature daily for 7 days after your surgery. Report a fever above 100.4°F (38°C)  to your doctor. Fever may be a sign of infection.  Follow-up care  Follow up with your healthcare provider, or as advised.      When to call your healthcare provider  Call 911 right away if you have any of the following:  · Chest pain  · Shortness of breath  Otherwise, call your healthcare provider right away if you have any of these:  · Increasing shoulder pain or pain not relieved by medicine  · Pain or swelling in the arm on the side of your surgery  · Numbness, tingling, coolness, or blue-gray color of your arm or fingers on the side of your surgery  · Fever above 100.4°F (38°C), or as advised  · Shaking chills  · Drainage or oozing, redness, or warmth at the  incision  · Nausea or vomiting   Date Last Reviewed: 7/1/2016  © 1579-0412 [employer]. [address] [place] [address], [place] [address]. All rights reserved. This information is not intended as a substitute for professional medical care. Always follow your healthcare professional's instructions.

## 2018-01-03 NOTE — PROGRESS NOTES
Qball connected to nerve block catheter. Aspirate prior to connection negative. Patient AAOx4, family at bedside. Patient denies pain or nausea, tolerating po.

## 2018-01-03 NOTE — BRIEF OP NOTE
[facility]  Brief Operative Note     SUMMARY     Surgery Date: 1/3/2018     Surgeon(s) and Role:     * Christine Simpson MD - Primary     * Otis Cummins MD - Resident - Assisting     * Sacha Arriaga MD - Resident - Assisting        Pre-op Diagnosis:  Tear of right rotator cuff, unspecified tear extent [M75.101]    Post-op Diagnosis:  Post-Op Diagnosis Codes:     * Tear of right rotator cuff, unspecified tear extent [M75.101]    Procedure(s) (LRB):  REPAIR ROTATOR CUFF ARTHROSCOPIC right (Right)    Anesthesia: General    Description of the findings of the procedure: see op note    Findings/Key Components: see op note      Estimated Blood Loss: * No values recorded between 1/3/2018  1:47 PM and 1/3/2018  3:17 PM *         Specimens:   Specimen (12h ago through future)    None          Discharge Note    SUMMARY     Admit Date: 1/3/2018    Discharge Date and Time:  01/03/2018 3:28 PM    Hospital Course (synopsis of major diagnoses, care, treatment, and services provided during the course of the hospital stay): Patient presented for outpatient procedure, tolerated well, and was discharged home after appropriate recovery from anesthesia.      Final Diagnosis: Post-Op Diagnosis Codes:     * Tear of right rotator cuff, unspecified tear extent [M75.101]    Disposition: Home or Self Care    Follow Up/Patient Instructions:     Medications:  Reconciled Home Medications:   Current Discharge Medication List      CONTINUE these medications which have NOT CHANGED    Details   trandolapril (MAVIK) 4 MG Tab 4 mg every morning.       verapamil (CALAN-SR) 240 MG CR tablet Take 240 mg by mouth every morning.          STOP taking these medications       aspirin 81 mg Tab Comments:   Reason for Stopping:         fish oil-omega-3 fatty acids 300-1,000 mg capsule Comments:   Reason for Stopping:         multivitamin (THERAGRAN) per tablet Comments:   Reason for Stopping:         naproxen sodium (ALEVE)  220 mg Cap Comments:   Reason for Stopping:         VOLTAREN 1 % Gel Comments:   Reason for Stopping:               Discharge Procedure Orders  Call MD for:  temperature >100.4     Call MD for:  persistent nausea and vomiting or diarrhea     Call MD for:  redness, tenderness, or signs of infection (pain, swelling, redness, odor or green/yellow discharge around incision site)     Call MD for:  difficulty breathing or increased cough     Call MD for:  severe persistent headache     Call MD for:  worsening rash     Call MD for:  persistent dizziness, light-headedness, or visual disturbances     Call MD for:  increased confusion or weakness     Leave dressing on - Keep it clean, dry, and intact until clinic visit     Weight bearing restrictions (specify)   Order Comments: Nonweight bearing right upper extremity     Sponge bath only until clinic visit       Follow-up Information     La Gallegos PA-C. Go on 1/17/2018.    Specialties:  Hand Surgery, Orthopedic Surgery  Why:  For wound re-check  Contact information:  [address]  [facility] [address]  [phone]

## 2018-01-03 NOTE — H&P (VIEW-ONLY)
[facility] - Pre Op Consult  Progress Note    Patient Name: Sharita Gonzalez  MRN: [id number]  Date of Evaluation- 12/18/2017  PCP- Dawood Leo MD        HPI:  History of present illness- I had the pleasure of meeting this pleasant 72 y.o. gentleman in the pre op clinic prior to his elective Orthopedic surgery. The patient is new to me .     I have obtained the history by speaking to the patient and by reviewing the electronic health records.    Events leading up to surgery / History of presenting illness -    He has been troubled with moderate-severe  Rt shoulder  pain for about 7 months . Pain increases with lifting arm up  and activity and decreases with resting.   This all started after picking up a butane tank and stepping off a curb funny, causing his arm to jerk   Had a similar incident when had biceps injury 5-6 years ago     Relevant health conditions of significance for the perioperative period/ History of presenting illness -    Subjectively describes health as pretty good   Stays active     Hypertension ,On medication  Home  machine readings -  120-130/80's    Polymyalgia rheumatica  History of PMR 2012 that went into remission. Symptoms reoccurred 2015 and pred restarted   Took Prednisone for 1.5 years   Went into remission earlier earlier 2017      Obesity   Lost 12 pounds since off of Steroid 4 months ago    ASA- prevention    Not known to have heart disease , Diabetes Mellitus, Lung disease           Subjective/ Objective:          Chief complaint-Preoperative evaluation, Perioperative Medical management, complication reduction plan     Active cardiac conditions- none    Revised cardiac risk index predictors- none    Functional capacity -Examples of physical activity, lives in a raised house , 9 feet off the ground , has 12 steps to get up, does walking with his cat ,  can take 1 flight of stairs and cutting the grass with a mower----- He can undertake all the above activities without  chest pain,chest  "tightness, Shortness of breath ,dizziness,lightheadedness making his exercise tolerance more  than 4 Mets.       Review of Systems   Constitutional: Negative for chills and fever.   HENT:        Snoring   Witnessed stopping of breathing   Elevated BP  Age over 50 years  Neck size over 40 CM  Male gender   Eyes:        No unusual vision changes   Respiratory:        Cough   Clear Phlegm   Going through sinus problem   Does not feel sick   No fever   No hemoptysis    Cardiovascular:        As noted   Gastrointestinal:        Bowels- Regular   No overt GI/ blood losses   Endocrine:        Recent steroid use, prednisone- None   Genitourinary: Negative for dysuria.        Urinary frequency- drinks water before going to bed    Musculoskeletal:        As above      Skin: Negative for rash.   Neurological: Negative for syncope.        No unilateral weakness   Hematological:        Current use of Anticoagulants- none   Psychiatric/Behavioral:        No Depression  Anxiety     No vascular stenting   No past medical history pertinent negatives.    Past Surgical History:   Procedure Laterality Date    BRAIN SURGERY  age 27    subarrachnoid brain hemorhage repair    CATARACT EXTRACTION      FINGER SURGERY  9-17-13    left TFR    HEMORRHOID SURGERY      SHOULDER SURGERY         No anesthesia, bleeding, cardiac problems, PONV with previous surgeries/procedures.  Medications and Allergies reviewed in epic.   FH- No anesthesia, thrombosis  , early onset heart disease in family   Lives with wife , help is available     Physical Exam  Blood pressure 137/82, pulse 71, temperature 97.7 °F (36.5 °C), temperature source Oral, height 5' 5" (1.651 m), weight 85 kg (187 lb 8 oz), SpO2 97 %.      Investigations  Lab and Imaging have been reviewed in [facility].    Review of Medicine tests    EKG- I had independently reviewed the EKG from--3/28/2017   It was reported to be showing     Normal sinus rhythm  Normal ECG    Review of clinical lab " "tests-Date---6/26/2017  Creatinine-1.2  Date--3/2/2016Hemoglobin-N- Platelet count--n   Discussed with patient about labs-deferred at this time      Review of old records- Was done and information gathered regards to events leading to surgery and health conditions of significance in the perioperative period.        Preoperative cardiac risk assessment-  The patient does not have any active cardiac conditions . Revised cardiac risk index predictors-1 ---.Functional capacity is more than 4 Mets. He will be undergoing a Orthopedic procedure that carries a intermediate risk     The estimated risk of the rate of adverse cardiac outcomes  0.4%    No further cardiac work up is indicated prior to proceeding with the surgery          American Society of Anesthesiologists Physical status classification ( ASA ) class: 3     Postoperative pulmonary complication risk assessment:    /82 Comment: lt  Pulse 71   Temp 97.7 °F (36.5 °C) (Oral)   Ht 5' 5" (1.651 m)   Wt 85 kg (187 lb 8 oz)   SpO2 97%   BMI 31.20 kg/m²      ARISCAT ( Canet) risk index- risk class -  Low, if duration of surgery is under 3 hours, intermediate, if duration of surgery is over 3 hours        Assessment/Plan:     Polymyalgia rheumatica  In Remission    Hypertension  Hypertension-  Blood pressure is acceptable . I suggest continuation of -Verapamil- during the entire perioperative period. I suggest holding Trandolapril  on the morning of the surgery and can continue that  post operatively under blood pressure, electrolyte and renal function monitoring as long as they are acceptable.I suggest addressing pain control as uncontrolled pain can increased blood pressure     Class 1 obesity with body mass index (BMI) of 31.0 to 31.9 in adult  Encouraged on going weight loss    Prostate cancer  Aged about 60's   Had Brachy therapy   subjectively feel like he is emptying well   No rectal/ urinary bleeding     Subarachnoid hemorrhage  At age 27 years , " while weeding his garden ( Not strenuous )   No injury   Resolved spontaneously ( no clipping , coiling , aneurysm history )    Snoring    Possible sleep apnea- I suggested a sleep study and suggest caution with usage of medication that can cause respiratory suppression in the perioperative period  potential ramifications of untreated sleep apnea, which could include daytime sleepiness, hypertension, heart disease and stroke were discussed          Tobacco use  Tobacco use-I  Informed about risk of wound healing problem ,infection,lung complications,thrombosis with tobacco use   I suggested to consider stopping  smoking tobacco for its benefits in the narcisa operative period and in the long term        Preventive perioperative care    Thromboembolic prophylaxis:  His risk factors for thrombosis include obesity and surgical procedure.I suggest  thromboembolic prophylaxis ( mechanical/pharmacological, weighing the risk benefits of pharmacological agent use considering narcisa procedural bleeding )  during the perioperative period.I suggested being active in the post operative period.      Postoperative pulmonary complication prophylaxis-Risk factors for post operative pulmonary complications include obesity, Possible sleep apnea  age over 65 years, ASA class >2 and proximity of the surgical site to the lungs- I suggest incentive spirometry use, early ambulation, end tidal carbon dioxide monitoring and pain control so as to avoid diaphragmatic splinting , oral care , head end of bed elevation      Renal complication prophylaxis-Risk factors for renal complications include hypertension . I suggest keeping him well hydrated .I suggested drinking 2 litre's of water a day      Surgical site Infection Prophylaxis-I  suggest appropriate antibiotic for Prophylaxis against Surgical site infections     In view of urinary frequency ,the patient  is at risk of postoperative urinary retention.  I suggest avoidance / minimizing the of   Benzodiazepines,Anticholinergic medication,antihistamines ( Benadryl) , if possible in the perioperative period. I suggest using the minimum possible use of opioids for the minimum period of time in the perioperative period. Benadryl avoidance suggested      This visit was focused on Preoperative evaluation, Perioperative Medical management, complication reduction plans. I suggest that the patient follows up with primary care or relevant sub specialists for ongoing health care.    I appreciate the opportunity to be involved in this patients care. Please feel free to contact me if there were any questions about this consultation.    Patient is optimized    Radha Clark MD  Perioperative Medicine  [facility]   Pager [phone]    Patient was instructed to call and update me about any changes to health, changes to medication, office visits , hospitalizations between now and surgery   -----    1/2- 1900    Called to follow up  Spoke to patient   Doing fine , ready for surgery  No changes to health and Medication  ASA - for prevention reasons- holding 1 week pre op  Holding Mavik - mis understanding - held 1 week pre op   Suggested to take Mavik now  Does not feel like BP is up   No headache , lightheadedness

## 2018-01-03 NOTE — INTERVAL H&P NOTE
The patient has been examined and the H&P has been reviewed:    I concur with the findings and no changes have occurred since H&P was written.    Anesthesia/Surgery risks, benefits and alternative options discussed and understood by patient/family.          Active Hospital Problems    Diagnosis  POA    Rotator cuff arthropathy, right [M12.811]  Yes      Resolved Hospital Problems    Diagnosis Date Resolved POA   No resolved problems to display.

## 2018-01-03 NOTE — H&P
CHIEF COMPLAINT: Right Shoulder pain.                                              HISTORY OF PRESENT ILLNESS:  The patient is a 72 y.o. male  who presents for evaluation of his right shoulder pain. This all started after picking up a butane tank and stepping off a curb funny, causing his arm to jerk. Last time he was here we prescribed PT/OT and had an MRI of his biceps. He has pain in his right shoulder when he sleeps and when he does overhead activities. He also sometimes feels a catching sensation when he brings his arm down. He goes fishing a lot and finds that his right arm tires more quickly than it used to. His pain is over the AC joint and posterior shoulder. He occasionally has pain over the distal end of his biceps. The pain is mostly constant dull, achy pain (5/10), but occasionally is sharp (8/10). Physical therapy has improved his strength, but he continues to have shoulder pain. He has been taking aleve for the past 3-4 weeks which has helped some at night. He has had an injection in his left shoulder, but not his left.     PAST MEDICAL HISTORY:        Past Medical History:   Diagnosis Date    Hypertension      Polymyalgia      Prostate cancer        PAST SURGICAL HISTORY:         Past Surgical History:   Procedure Laterality Date    BRAIN SURGERY   age 27     subarrachnoid brain hemorhage repair    CATARACT EXTRACTION        FINGER SURGERY   9-17-13     left TFR    HEMORRHOID SURGERY        SHOULDER SURGERY          FAMILY HISTORY:         Family History   Problem Relation Age of Onset    Anesthesia problems Neg Hx      Broken bones Neg Hx      Cancer Neg Hx      Clotting disorder Neg Hx      Collagen disease Neg Hx      Diabetes Neg Hx      Dislocations Neg Hx      Osteoporosis Neg Hx      Rheumatologic disease Neg Hx      Scoliosis Neg Hx      Severe sprains Neg Hx        SOCIAL HISTORY:   Social History   Social History            Social History    Marital status:         "Spouse name: N/A    Number of children: N/A    Years of education: N/A          Occupational History    Not on file.             Social History Main Topics    Smoking status: Light Tobacco Smoker       Types: Cigars    Smokeless tobacco: Never Used         Comment: smokes a cigar once a month    Alcohol use No    Drug use: No    Sexual activity: Not on file           Other Topics Concern    Not on file          Social History Narrative    No narrative on file            MEDICATIONS:   Current Outpatient Prescriptions:     aspirin 81 mg Tab, Take 81 mg by mouth. 1 Tablet Oral Every day, Disp: , Rfl:     fish oil-omega-3 fatty acids 300-1,000 mg capsule, Take 2 g by mouth once daily., Disp: , Rfl:     multivitamin (THERAGRAN) per tablet, Take by mouth. 1 Tablet Oral Every day, Disp: , Rfl:     trandolapril (MAVIK) 4 MG Tab, , Disp: , Rfl:     verapamil (CALAN-SR) 240 MG CR tablet, Take 240 mg by mouth every evening., Disp: , Rfl:   ALLERGIES:        Review of patient's allergies indicates:   Allergen Reactions    Adhesive Rash    Latex, natural rubber Rash         VITAL SIGNS: BP (!) 153/89   Pulse 72   Resp 18   Ht 5' 5" (1.651 m)   Wt 82.1 kg (181 lb)   BMI 30.12 kg/m²       Review of Systems   Constitution: Negative for chills, fever, and weight loss.   HENT: Negative for congestion.   Cardiovascular: Negative for chest pain and dyspnea on exertion.   Respiratory: Negative for cough and shortness of breath.   Hematologic/Lymphatic: Does not bruise/bleed easily.   Skin: Negative for rash and suspicious lesions.   Musculoskeletal: see HPI  Gastrointestinal: Negative for bowel incontinence, constipation,diarrhea, vomiting.   Genitourinary: Negative for bladder incontinence.   Neurological: Negative for numbness, paresthesias and sensory change.         Physical Exam   Constitutional: Oriented to person, place, and time. Appears well-developed and well-nourished.   Head: Normocephalic and " atraumatic.   Nose: Nose normal.   Eyes: No scleral icterus.   Neck: Normal range of motion. Neck supple.   Cardiovascular: Normal rate and regular rhythm.    Pulses:Radial pulses/ DP are 2+ on the right side, and 2+ on the left side.   Pulmonary/Chest: Effort normal and breath sounds normal.   Abdominal: Soft.   Neurological: Alert and oriented to person, place, and time.   Skin: Skin is warm.   Psychiatric: Normal mood and affect.   Musc:        RIGHT SHOULDER / UPPER EXTREMITY EXAM       OBSERVATION:    Swelling                       none                Deformity                     none  Discoloration               none                Scapular winging        none  Scars                           none                Atrophy                        none     TENDERNESS / CREPITUS (T/C):  No tenderness or cerpitus                                        ROM:   Full ROM, but pain with FF and abduction     STRENGTH:                RIGHT SHOULDER               LEFT SHOULDER  SCAPTION                              5/5                                           5/5         IR                                             5/5                                           5/5  ER                                           5/5                                           5/5  BICEPS                                   5/5                                           5/5  Deltoid                                     5/5                                           5/5  SIGNS:  Right side                                           Positive Speed's              Negative Neer's     XRAYS:  X-rays reviewed     ASSESSMENT:   R shoulder pain     PLAN:  Plan for right shoulder arthroscopic.    This procedure has been fully reviewed with the patient and written informed consent has been obtained.

## 2018-01-04 ENCOUNTER — NURSE TRIAGE (OUTPATIENT)
Dept: ADMINISTRATIVE | Facility: CLINIC | Age: 73
End: 2018-01-04

## 2018-01-04 NOTE — ANESTHESIA POST-OP PAIN MANAGEMENT
Regional Progress Note  Called Mr. Gonzalez to check on how he was doing post-op. Stated that pain was well controlled with On-Q infusion running. Pain ball is decreasing in size, as expected. No complaints/concerns at this time. Will call tomorrow to ensure that catheter was removed without difficulty.

## 2018-01-04 NOTE — ANESTHESIA POSTPROCEDURE EVALUATION
"Anesthesia Post Evaluation    Patient: Sharita Gonzalez    Procedure(s) Performed: Procedure(s) (LRB):  REPAIR ROTATOR CUFF ARTHROSCOPIC right (Right)    Final Anesthesia Type: general  Patient location during evaluation: PACU  Patient participation: Yes- Able to Participate  Level of consciousness: awake and alert  Post-procedure vital signs: reviewed and stable  Pain management: adequate  Airway patency: patent  PONV status at discharge: No PONV  Anesthetic complications: no      Cardiovascular status: blood pressure returned to baseline  Respiratory status: unassisted, spontaneous ventilation and room air  Hydration status: euvolemic  Follow-up not needed.        Visit Vitals  /67   Pulse 78   Temp 36.7 °C (98 °F) (Temporal)   Resp 16   Ht 5' 5" (1.651 m)   Wt 82.1 kg (181 lb)   SpO2 99%   BMI 30.12 kg/m²       Pain/Felicita Score: Pain Assessment Performed: Yes (1/3/2018  5:55 PM)  Presence of Pain: denies (1/3/2018  5:55 PM)  Pain Rating Prior to Med Admin: 0 (1/3/2018  4:33 PM)  Felicita Score: 10 (1/3/2018  5:55 PM)      "

## 2018-01-05 ENCOUNTER — PATIENT MESSAGE (OUTPATIENT)
Dept: ORTHOPEDICS | Facility: CLINIC | Age: 73
End: 2018-01-05

## 2018-01-05 NOTE — PROGRESS NOTES
Called the patient via telephone number provided. Patient reports that his pain is well controlled. Patient expressed his understanding the the On Q catheter is to be removed today. All questions answered.     Yanelis Starr MD  PGY-4  Anesthesiology

## 2018-01-05 NOTE — TELEPHONE ENCOUNTER
"  Reason for Disposition   Fever > 100.5 F (38.1 C)    Answer Assessment - Initial Assessment Questions  1. SYMPTOM: "What's the main symptom you're concerned about?" (e.g., pain, fever, vomiting)      ^temp   2. ONSET: "When did ________  start?"      This evening   3. SURGERY: "What surgery was performed?"      Rotary cuff repair   4. DATE of SURGERY: "When was surgery performed?"       1/3  5. ANESTHESIA: " What type of anesthesia did you have?" (e.g., general, spinal, epidural, local)      General   6. PAIN: "Is there any pain?" If so, ask: "How bad is it?"  (Scale 1-10; or mild, moderate, severe)      No   7. FEVER: "Do you have a fever?" If so, ask: "What is your temperature, how was it measured, and when did it start?"      100.6 repeated 100.8   8. VOMITING: "Is there any vomiting?" If yes, ask: "How many times?"      No   9. BLEEDING: "Is there any bleeding?" If so, ask: "How much?" and "Where?"      No   10. OTHER SYMPTOMS: "Do you have any other symptoms?" (e.g., drainage from wound, painful urination, constipation)        No    Protocols used: ST POST-OP SYMPTOMS AND [id number]    "

## 2018-01-05 NOTE — TELEPHONE ENCOUNTER
Additional Information   Commented on: Fever > 100.5 F (38.1 C)     On-call provider paged for notification/advisement.    Protocols used: ST POST-OP SYMPTOMS AND [id number]

## 2018-01-17 NOTE — PROGRESS NOTES
DISCHARGE PLANNING - CARE MANAGEMENT     Discharge to post-acute care or home with appropriate resources Progressing        GASTROINTESTINAL - ADULT     Minimal or absence of nausea and/or vomiting Progressing     Maintains or returns to baseline bowel function Progressing     Maintains adequate nutritional intake Progressing        METABOLIC, FLUID AND ELECTROLYTES - ADULT     Electrolytes maintained within normal limits Progressing     Fluid balance maintained Progressing        Nutrition/Hydration-ADULT     Nutrient/Hydration intake appropriate for improving, restoring or maintaining nutritional needs Progressing        PAIN - ADULT     Verbalizes/displays adequate comfort level or baseline comfort level Progressing Progress Note    Patient:  Sharita Gonzalez  [facility] #:  [id number]   Date of Note: 10/02/2017   Referring Physician:  Christine Simpson, *  Diagnosis:    Encounter Diagnoses   Name Primary?    Right arm pain Yes    Strain of muscle, fascia and tendon of long head of biceps, right arm, initial encounter         Start Time: 9:00 am  End Time: 9:40 am  Total Time: 40 min  Group Time: -    7 of 20 visits expires 12/31/17    Subjective:   Pt states his doing okay still with some soreness in his arm.   Pain: 2 out of 10     Objective:   Patient seen by OT this session. Treatment  consist of the following:  Ultrasound and Therapeutic exercises    Treatment:  Therapeutic exercises x 30 min, ice x 10 min      Pt engaged in sci fit with BUE maintaining speed and verbal cues for directional changes.  Doorway stretch for ER x 5 reps holding x 10 seconds each.  Pt engaged in varinder scapular stabilization with step outs on wall x 2 sets  5 cycles. Pt engaged in scapular stabilization with wall lifts x 10 reps. Blue theraband horizontal rows for scapular strengthening x 2 sets of 10 reps.  [place] ball on wall scapular stabilization CCW/CW rotations x 2 cycles of 10 reps.   Matrix equipment ex as follows:  Tricep curls 40#  x 20 reps, Chest press with 10# resistance x 2 sets of 10 reps.   Ice applied to right shoulder x 8 min post therapy.  Education re: how to progress his exercises at home.    Plan of care rec'd from Dr. Simpson for care from 8/22/17 to 10/12/17    Assessment:  Pt performed ex well, with appropriate tiredness after ex.  Pt will continue to benefit from skilled OT intervention. Medical necessity is demonstrated by: Pt continues to be limited in functional and leisurely pursuits. Pain limits patients participation in ADL's. Pt is not able to carryout necessary vocational tasks. Patient continues to requires cues and skilled supervision to complete HEP    Plan:  Continue with plan of care 1 x week x 8weeks   during the certification period from   8/22/17 to 10/12/17  in pursuit of  OT goals.

## 2018-01-19 ENCOUNTER — OFFICE VISIT (OUTPATIENT)
Dept: ORTHOPEDICS | Facility: CLINIC | Age: 73
End: 2018-01-19
Payer: MEDICARE

## 2018-01-19 VITALS
WEIGHT: 181 LBS | SYSTOLIC BLOOD PRESSURE: 121 MMHG | BODY MASS INDEX: 30.16 KG/M2 | HEART RATE: 76 BPM | HEIGHT: 65 IN | DIASTOLIC BLOOD PRESSURE: 72 MMHG | RESPIRATION RATE: 18 BRPM

## 2018-01-19 DIAGNOSIS — M75.101 TEAR OF RIGHT ROTATOR CUFF, UNSPECIFIED TEAR EXTENT: Primary | ICD-10-CM

## 2018-01-19 PROCEDURE — 99999 PR PBB SHADOW E&M-EST. PATIENT-LVL III: CPT | Mod: PBBFAC,,, | Performed by: PHYSICIAN ASSISTANT

## 2018-01-19 PROCEDURE — 99024 POSTOP FOLLOW-UP VISIT: CPT | Mod: S$GLB,,, | Performed by: PHYSICIAN ASSISTANT

## 2018-01-19 NOTE — PROGRESS NOTES
"Mr. Gonzalez is here today for a post-operative visit.  He is 16 days status post right shoulder arthroscopy, rotator cuff repair, and debridement of the rotator cuff by Dr. Simpson on 1/3/18. He reports that he is doing well.  Pain is minimal.  Percocet caused rash, he is taking Tylenol prn.  He denies fever, chills, and sweats since the time of the surgery.     Physical exam:    Vitals:    01/19/18 1313   BP: 121/72   Pulse: 76   Resp: 18   Weight: 82.1 kg (181 lb)   Height: 5' 5" (1.651 m)   PainSc:   2     Vital signs are stable, patient is afebrile.  Patient is well dressed and well groomed, no acute distress.  Alert and oriented to person, place, and time.  Post op dressing taken down.  Incision is clean, dry and intact.  There is no erythema or exudate.  There is no sign of any infection. He is NVI. Sutures removed without difficulty. Sling and pillow in place. Good ROM of the elbow, wrist, and fingers.     Assessment: 16 days status post right shoulder arthroscopy, rotator cuff repair, and debridement of the rotator cuff    Plan:  Sharita was seen today for post-op evaluation.    Diagnoses and all orders for this visit:    Tear of right rotator cuff, unspecified tear extent  -     Ambulatory Referral to Physical/Occupational Therapy      -PO instruction reviewed and provided to patient  -PT ordered  -Full time use of sling and pillow for the next 4 weeks  -RTC 4 weeks      La Gallegos PA-C  [facility]   [facility]    "

## 2018-01-29 ENCOUNTER — CLINICAL SUPPORT (OUTPATIENT)
Dept: REHABILITATION | Facility: HOSPITAL | Age: 73
End: 2018-01-29
Payer: MEDICARE

## 2018-01-29 DIAGNOSIS — Z98.890 S/P RIGHT ROTATOR CUFF REPAIR: ICD-10-CM

## 2018-01-29 DIAGNOSIS — M25.60 RANGE OF MOTION DEFICIT: ICD-10-CM

## 2018-01-29 DIAGNOSIS — M79.601 RIGHT ARM PAIN: Primary | ICD-10-CM

## 2018-01-29 PROCEDURE — G8987 SELF CARE CURRENT STATUS: HCPCS | Mod: CL,PN

## 2018-01-29 PROCEDURE — 97165 OT EVAL LOW COMPLEX 30 MIN: CPT | Mod: PN

## 2018-01-29 PROCEDURE — G8988 SELF CARE GOAL STATUS: HCPCS | Mod: CK,PN

## 2018-01-29 NOTE — PATIENT INSTRUCTIONS
ROM: Pendulum (Flexion / Extension)        NO SWINGING ARM OR CASTING WITH FISHING POLE    Let right arm hang by your side with left hand supported on table or sofa/chair, lean over from waist allowing arm to dangle.  Repeat __10__ times per set. Do __3__ sessions per day.     [email or web address]     Copyright © I. All rights reserved.

## 2018-01-30 NOTE — PLAN OF CARE
Occupational Therapy Evaluation    Patient: Sharita Gonzalez   [facility] #: [id number]  Date of evaluation: 2018     Referring Physician: La Gallegos PA-C  Diagnosis:   Encounter Diagnoses   Name Primary?    Right arm pain Yes    S/P right rotator cuff repair     Range of motion deficit      Referral Orders: Eval and Treat  Age: 72 y.o.  Sex: male          Hand dominance: Right    Time In: 2:10 pm  Time Out: 2:55 pm    Occupation: retired  Working presently: No  Last time worked:   Workmen's Compensation: No    Date of onset: surgery 18  Involved area: right  Mechanism of Injury: pt lifted a butane bottle in .    Past Medical History:   Diagnosis Date    Hypertension     Polymyalgia     Prostate cancer      Precautions:   Current Outpatient Prescriptions   Medication Sig    docusate sodium (COLACE) 100 MG capsule Take 1 capsule (100 mg total) by mouth 2 (two) times daily.    ondansetron (ZOFRAN) 8 MG tablet Take 1 tablet (8 mg total) by mouth every 8 (eight) hours as needed for Nausea.    trandolapril (MAVIK) 4 MG Tab 4 mg every morning.     verapamil (CALAN-SR) 240 MG CR tablet Take 240 mg by mouth every morning.      No current facility-administered medications for this visit.      Review of patient's allergies indicates:   Allergen Reactions    Statins-hmg-coa reductase inhibitors      Back ache with Lipitor     Adhesive Rash     Pulls skin off    Latex, natural rubber Rash    Percocet [oxycodone-acetaminophen] Rash     X-Rays/Tests: Complete tear of the supraspinatus tendon.  High-grade tear of the anterior and mid fibers of the infraspinatus.  Prior biceps tenodesis.    Subjective:  I am doing okay,  Sleeping is some difficult.  I have been doing my dangling ex.  Pain:  During no work: 0/10  While workin-3/10  Sleepin/10  Location of pain: lateral shoulder    Sharita's goals for therapy are: To get better quick and to be able to use it.  I want to go fishing.    Objective:  Sensation  Test: sensation grossly intact but does report intermittent numbness in index and long fingers    Observation/Inspection  Healed anterior and posterior port holes with lateral mid deltoid incision    Range of Motion:      (L) UE (R) UE     PROM PROM Norm   Shoulder Flexion   0-180   Shoulder Flexion  80 180   Shoulder Abduction   45 0-180   Shoulder Extension  n/t 0-50   Shoulder Internal Rotation  45 0-90   Shoulder External Rotation  25 0-90   Horz Shoulder Adduction  neutral 0-40     ROM Comments:   Soft end feel    Strength deferred until appropriate    Pull Tests: n/t  Abduction:  Ext. Rotation:     Special Tests: n/t  Positive:   Negative:     Palpation: (for pain)     Positive: no specific areas noted       Cultural/Spiritual/Education Needs: none noted or reported  Barriers to Learning: none noted or reported    Limitations of Functional Status:   Self Care: requires increase time to don clothes with left hand, wife helps with washing left side of his body  Work: not doing any physical activities with RUEf  Leisure: fishing    Treatment included: OT evaluation, the following exercises (HEP) were instructed and Sharita was able to demonstrate them prior to the end of the session. HEP are as follows:   Pendulum dangle.  Pt educated re: the process/protocol from passive to assistive to active then resistive ex and the importance of avoiding active motion of shoulder too early.  Pt expressed understanding of information and was able to demonstrate proper pendulum dangle in therapy.    Assessment  This 72 y.o. male referred to Outpatient Occupational Therapy with diagnosis of   Encounter Diagnoses   Name Primary?    Right arm pain Yes    S/P right rotator cuff repair     Range of motion deficit     presents with limitations as described in problem list. Patient can benefit from Occupational Therapy services for PROM and home exercise program provied with written instructions progressing to AAROM, AROM and  strengthening.  The following goals were discussed with the patient and he is in agreement with them as to be addressed in the treatment plan.     Problem List:   Decreased function of Right UE, Decreased ROM, Increased pain, Decreased strength, Inability to perform work/tasks, Inability to perform leisure activiites and difficulty with self care tasks    Goals to be met in 8 weeks:  1) Pt will be independent and report performing HEP to maximize (R) shoulder functional capacity.  2) Pt will increase shoulder PROM in all measured planes of motion by 20 degrees to A with daily task.  3) Pt will report use of home modalities for pain management.  4) Pt will report 1 of 10 pain with use of RUE.  5) Pt will report interrupted sleep no more than twice a week  6) Assess AROM when appropriate    G CODE TOOL: FOTO  Self care  Current Score  = 27 or 73% impaired   Goal at Discharge Score 57 or 43% impaired   Discharge status Score = - or -% impaired     Score interpretation is as follows:     TEST SCORE  Modifier  Impairment Limitation Restriction    0%  CH  0 % impaired, limited or restricted    1-19%  CI  @ least 1% but less than 20% impaired, limited or restricted    20-39%  CJ  @ least 20%<40% impaired, limited or restricted    40-59%  CK  @ least 40%<60% impaired, limited or restricted    60-79%  CL  @ least 60% <80% impaired, limited or restricted    80-99%  CM  @ least 80%<100% impaired limited or restricted    100%  CN  100% impaired, limited or restricted     Profile and History Assessment of Occupational Performance Level of Clinical Decision Making Complexity Score   Occupational Profile:   Sharita Gonzalez is a 72 y.o. male who lives with their spouse and is currently retired  Sharita Gonzalez has difficulty with  feeding, bathing, grooming and dressing  housework/household chores and leisure  affecting his/her daily functional abilities. His/her main goal for therapy is to go fishing.      Comorbidities:   polymyalgia    Medical and Therapy History Review:   Brief               Performance Deficits    Physical:  Joint Mobility  Muscle Power/Strength  Pain  ROM    Cognitive:  No Deficits    Psychosocial:    No Deficits     Clinical Decision Making:  low    Assessment Process:  Problem-Focused Assessments    Modification/Need for Assistance:  Not Necessary    Intervention Selection:  Several Treatment Options       low  Based on PMHX, co morbidities , data from assessments and functional level of assistance required with task and clinical presentation directly impacting function.         Plan  Sharita to be treated by Occupational Therapy 1-2 times per week(due to finances) for 8 weeks to achieve the established goals during certification period of 1/29/18 to 3/26/18   Treatment to include progression through protocol from passive to strengthening, modalities for pain, education as well as any other treatments deemed necessary based on the patient's needs or progress.         I certify the need for these services furnished under this plan of treatment and while under my care    ____________________________________  Physician/Referring Practitioner    _______________  Date of Signature

## 2018-02-05 ENCOUNTER — CLINICAL SUPPORT (OUTPATIENT)
Dept: REHABILITATION | Facility: HOSPITAL | Age: 73
End: 2018-02-05
Payer: MEDICARE

## 2018-02-05 DIAGNOSIS — M25.60 RANGE OF MOTION DEFICIT: ICD-10-CM

## 2018-02-05 DIAGNOSIS — M79.601 RIGHT ARM PAIN: Primary | ICD-10-CM

## 2018-02-05 DIAGNOSIS — Z98.890 S/P RIGHT ROTATOR CUFF REPAIR: ICD-10-CM

## 2018-02-05 PROCEDURE — 97110 THERAPEUTIC EXERCISES: CPT | Mod: PN

## 2018-02-05 NOTE — PROGRESS NOTES
Patient:  Sharita Gonzalez  [facility] #:  [id number]   Date of Note: 2/5/2018  Referring Physician: La Gallegos PA-C    Diagnosis:    Encounter Diagnoses   Name Primary?    Right arm pain Yes    S/P right rotator cuff repair     Range of motion deficit        Start Time: 1:00 pm  End Time: 1:30 pm  Total Time: 30 min  Group Time: -    2 of 20 visits expires 12/31/18    Subjective:   Pt reports that he has been taking his sling off a little more, brushing his teeth and pulling up his pants. Pt reports he has no pain in his shoulder/arm he is just a little sore.  Pain: 0 out of 10, pt did not rate his soreness.    Objective:   Patient seen by OT this session. Treatment  consist of the following:  Therapeutic exercises    Treatment: Therapeutic exercises x 30 min    Plan of care rec'd from Dr. Simpson for care from 1/29/18 to 3/26/18    Scapular mobilization performed in supine.  Pt rec'd passive ROM into all planes with minimal to no c/o discomfort. Shoulder flexion achieved passively to ~110, ER ~40, IR ~40, abduction to ~60.  Pt was engaged in scapular retraction x 10 reps with manual cues to avoid engagement of shoulder.  Pt performed scapular elevation retraction protraction in counter/clockwise directions x 10 reps. Attempts to perform pendulum exercise with his right arm with unsuccessful.   Pt was educated on the importance of keeping his arm in the sling and avoiding any active reaching of his right shoulder.    Plan of care rec'd from Dr. Simpson for care from 1/29/18 to 3/26/18.    Assessment:  Pt is progressing with his passive ROM, minimal pain in right shoulder reported during care.   Pt will continue to benefit from skilled OT intervention. Medical necessity is demonstrated by: Pt continues to be limited in functional and leisurely pursuits. Pain limits patients participation in ADL's. Pt is not able to carryout necessary vocational tasks. Patient continues to requires cues and skilled supervision to  complete HEP      Plan:  Continue with plan of care 1-2 x week x 8 weeks  during the certification period from  1/29/18 to 3/26/18  in pursuit of  OT goals.

## 2018-02-07 ENCOUNTER — CLINICAL SUPPORT (OUTPATIENT)
Dept: REHABILITATION | Facility: HOSPITAL | Age: 73
End: 2018-02-07
Payer: MEDICARE

## 2018-02-07 DIAGNOSIS — M79.601 RIGHT ARM PAIN: Primary | ICD-10-CM

## 2018-02-07 DIAGNOSIS — Z98.890 S/P RIGHT ROTATOR CUFF REPAIR: ICD-10-CM

## 2018-02-07 DIAGNOSIS — M25.60 RANGE OF MOTION DEFICIT: ICD-10-CM

## 2018-02-07 PROCEDURE — 97110 THERAPEUTIC EXERCISES: CPT | Mod: PN

## 2018-02-07 NOTE — PATIENT INSTRUCTIONS
ROM: Flexion - Wand (Supine)        Lie on back holding wand. Raise arms over head.   Repeat ____ times per set. Do ____ sets per session. Do ____ sessions per day.     [email or web address]     Copyright © [employer]. All rights reserved.

## 2018-02-07 NOTE — PROGRESS NOTES
Patient:  Sharita Gonzalez  [facility] #:  [id number]   Date of Note: 2/7/2018  Referring Physician: La Gallegos PA-C    Diagnosis:    Encounter Diagnoses   Name Primary?    Right arm pain Yes    S/P right rotator cuff repair     Range of motion deficit        Start Time:  8:35 am  End Time: 9:05 am  Total Time: 30 min  Group Time: -    3 of 20 visits expires 12/31/18  5 weeks post op    Subjective:   Pt reports that his shoulder is a little sore today  Pain: 3-4 out of 10, pt did not rate his soreness.    Objective:   Patient seen by OT this session. Treatment  consist of the following:  Therapeutic exercises    Treatment: Therapeutic exercises x 30 min    Plan of care rec'd from Dr. Simpson for care from 1/29/18 to 3/26/18    Scapular mobilization performed in supine.  Pt rec'd passive ROM into all planes with minimal to mild discomfort with end range flexion. Shoulder flexion achieved passively to 120, ER 40, IR 45. Passive stretching and mobilization x 20 min  Pt was instructed and performed supine AA dowel flexion.  Pt able to achieve 120*.  Pt performed 2 sets of 10 reps of supine flexion.  Ice applied to right shoulder to assist in mitigation of pain.  Pt provided with instructions for supine dowel flexion via my [employer] account.    Plan of care rec'd from Dr. Simpson for care from 1/29/18 to 3/26/18.    Assessment:  Pt demonstrating proper performance of ex for his HEP. Pt is progressing with passive ROM of right shoulder with minimal discomfort in shoulder.  Pt will continue to benefit from skilled OT intervention. Medical necessity is demonstrated by: Pt continues to be limited in functional and leisurely pursuits. Pain limits patients participation in ADL's. Pt is not able to carryout necessary vocational tasks. Patient continues to requires cues and skilled supervision to complete HEP      Plan:  Continue with plan of care 1-2 x week x 8 weeks  during the certification period from  1/29/18 to 3/26/18   in pursuit of  OT goals.

## 2018-02-15 ENCOUNTER — CLINICAL SUPPORT (OUTPATIENT)
Dept: REHABILITATION | Facility: HOSPITAL | Age: 73
End: 2018-02-15
Payer: MEDICARE

## 2018-02-15 DIAGNOSIS — M79.601 RIGHT ARM PAIN: Primary | ICD-10-CM

## 2018-02-15 DIAGNOSIS — M25.60 RANGE OF MOTION DEFICIT: ICD-10-CM

## 2018-02-15 DIAGNOSIS — Z98.890 S/P RIGHT ROTATOR CUFF REPAIR: ICD-10-CM

## 2018-02-15 PROCEDURE — 97110 THERAPEUTIC EXERCISES: CPT | Mod: PN

## 2018-02-15 NOTE — PROGRESS NOTES
Patient:  Sharita Gonzalez  [facility] #:  [id number]   Date of Note: 2/15/2018  Referring Physician: La Gallegos PA-C    Diagnosis:    Encounter Diagnoses   Name Primary?    Right arm pain Yes    S/P right rotator cuff repair     Range of motion deficit        Start Time: 10:00 am  End Time: 10:45 am  Total Time: 45 min 37 direct care  Group Time: -    4 of 20 visits expires 12/31/18  6 weeks post op    Subjective:   Pt reports that his shoulder is doing well.   Pain: 1-2 out of 10 with use, no pain at rest    Objective:   Patient seen by OT this session. Treatment  consist of the following:  Therapeutic exercises    Treatment: Therapeutic exercises x 37 min  Pt was instructed and performed self assisted pulley for flexion with gradual increase in motion with time.  Pt rec'd passive ROM into all planes with minimal to mild discomfort with end range flexion. Shoulder flexion achieved passively to 130, ER 50, IR 50.  Pt performed supine flexion and scapular protraction x 2 sets of 10 reps - 2 bouts.  Isolated left sidelying scapular retraction x 10 reps manual cues required.  Prone rows x 2 sets 10 reps.       Right shoulder AROM  Flexion 75  Abduction 65  ER 30  IR 50  H. Adduction 15     Ice applied to right shoulder to assist in mitigation of pain.      Plan of care rec'd from Dr. Simpson for care from 1/29/18 to 3/26/18.    Assessment:  Pt is demonstrating good control of active flexion to 120-130*. Poor scapular stabilization during up right active shoulder motion.  Pt has minimal discomfort during therapy and at rest.   Pt will continue to benefit from skilled OT intervention. Medical necessity is demonstrated by: Pt continues to be limited in functional and leisurely pursuits. Pain limits patients participation in ADL's. Pt is not able to carryout necessary vocational tasks. Patient continues to requires cues and skilled supervision to complete HEP      Plan:  Continue with plan of care 1-2 x week x 8 weeks   during the certification period from  1/29/18 to 3/26/18  in pursuit of  OT goals.  Pt to f/u with Dr. Simpson 2/16/18.

## 2018-02-16 ENCOUNTER — OFFICE VISIT (OUTPATIENT)
Dept: ORTHOPEDICS | Facility: CLINIC | Age: 73
End: 2018-02-16
Payer: MEDICARE

## 2018-02-16 VITALS
SYSTOLIC BLOOD PRESSURE: 122 MMHG | WEIGHT: 181 LBS | HEART RATE: 69 BPM | BODY MASS INDEX: 30.16 KG/M2 | DIASTOLIC BLOOD PRESSURE: 71 MMHG | HEIGHT: 65 IN

## 2018-02-16 DIAGNOSIS — M75.101 TEAR OF RIGHT ROTATOR CUFF, UNSPECIFIED TEAR EXTENT: Primary | ICD-10-CM

## 2018-02-16 DIAGNOSIS — Z98.890 POST-OPERATIVE STATE: ICD-10-CM

## 2018-02-16 PROCEDURE — 99024 POSTOP FOLLOW-UP VISIT: CPT | Mod: S$GLB,,, | Performed by: PHYSICIAN ASSISTANT

## 2018-02-16 PROCEDURE — 99999 PR PBB SHADOW E&M-EST. PATIENT-LVL III: CPT | Mod: PBBFAC,,, | Performed by: PHYSICIAN ASSISTANT

## 2018-02-16 NOTE — PROGRESS NOTES
"Mr. Gonzalez is here today for a post-operative visit.  He is 44 days status post right shoulder arthroscopy, rotator cuff repair, and debridement of the rotator cuff by Dr. Simpson on 1/3/18. He reports that he is doing well. Does not complain of pain, does have occasional soreness, not taking pain medication. He reports improvement in ROM with therapy. This week he has started to come out of the sling some. He denies fever, chills, and sweats since the time of the surgery.      Pt notes intermittent numbness and tingling of bl hands in the median nerve distribution. He has not tried anything for this. He feels in the past week symptoms have actually somewhat improved.     Physical exam:    Vitals:    02/16/18 1506   BP: 122/71   Pulse: 69   Weight: 82.1 kg (181 lb)   Height: 5' 5" (1.651 m)   PainSc: 0-No pain   PainLoc: Shoulder     Vital signs are stable, patient is afebrile.  Patient is well dressed and well groomed, no acute distress.  Alert and oriented to person, place, and time.  Incision is clean, dry and intact. There is no sign of any infection. He is NVI. Sling in place, taken down. He is able to touch his chin but not the top of his head. Good ROM of the elbow, wrist, and fingers. He has positive durkans bilaterally.    Assessment: 44 days status post right shoulder arthroscopy, rotator cuff repair, and debridement of the rotator cuff    Plan:  Sharita was seen today for pain.    Diagnoses and all orders for this visit:    Tear of right rotator cuff, unspecified tear extent    Post-operative state      -PO instruction reviewed and provided to patient  -can wean from sling   -NWB (12+ wks PO)  -he does not wish to investigate/treat possible bl CTS at this time, he will monitor symptoms   -RTC 6 wks       La Gallegos PA-C  [facility]   [facility]    "

## 2018-02-19 ENCOUNTER — CLINICAL SUPPORT (OUTPATIENT)
Dept: REHABILITATION | Facility: HOSPITAL | Age: 73
End: 2018-02-19
Payer: MEDICARE

## 2018-02-19 DIAGNOSIS — Z98.890 S/P RIGHT ROTATOR CUFF REPAIR: ICD-10-CM

## 2018-02-19 DIAGNOSIS — M79.601 RIGHT ARM PAIN: Primary | ICD-10-CM

## 2018-02-19 DIAGNOSIS — M25.60 RANGE OF MOTION DEFICIT: ICD-10-CM

## 2018-02-19 PROCEDURE — G8988 SELF CARE GOAL STATUS: HCPCS | Mod: CK,PN

## 2018-02-19 PROCEDURE — 97110 THERAPEUTIC EXERCISES: CPT | Mod: PN

## 2018-02-19 PROCEDURE — G8987 SELF CARE CURRENT STATUS: HCPCS | Mod: CK,PN

## 2018-02-19 NOTE — PROGRESS NOTES
Patient:  Sharita Gonzalez  [facility] #:  [id number]   Date of Note: 2/19/2018  Referring Physician: La Gallegos PA-C    Diagnosis:    Encounter Diagnoses   Name Primary?    Right arm pain Yes    S/P right rotator cuff repair     Range of motion deficit        Start Time: 8:55 am  End Time: 9:40 am  Total Time: 45 min   Group Time: -    5 of 20 visits expires 12/31/18  6 weeks 5 days post op    Subjective:   Pt reports the orthopedic PA wanted him to use his sling for about 2 more weeks. Pt reports he did not do his exercises over the weekend and was really sore after his Friday treatment.  Pain: 2-3 out of 10 with use, no pain at rest    Objective:   Patient seen by OT this session. Treatment  consist of the following:  Therapeutic exercises    Treatment: Therapeutic exercises x 39 min  Pt was instructed and performed self assisted pulley for flexion with gradual increase in motion with time with a slight decrease in discomfort with increased reps.  Pt rec'd passive ROM into all planes with minimal to mild discomfort with end range flexion. Soft end feel noted at all end ranges. Scapular protraction, with hands on to control elevation of scapula, x 20 reps. Pt performed supine dowel flexion and scapular protraction x 1 sets of 10 reps. Supine active right shoulder flexion x 10 reps .  Isolated left sidelying scapular retraction x 10 reps axilla towel roll. Seated ER x 10 reps with towel roll. Ice to right shoulder x 6 min. Pt to begin with supine dowel flexion and scapular protraction at home as well as supine active flexion with RUE.  Pt expressed understanding.    Plan of care rec'd from Dr. Simpson for care from 1/29/18 to 3/26/18.    Assessment:  Pt with better control of his scapula during exercise.  Pt continues with soft end feel of shoulder joint during motion.      G CODE TOOL: FOTO  Self care  Current Score  = 48 or 52 % impaired  improving  Goal at Discharge Score 57 or 43% impaired    Discharge status Score = - or -% impaired     Pt will continue to benefit from skilled OT intervention. Medical necessity is demonstrated by: Pt continues to be limited in functional and leisurely pursuits. Pain limits patients participation in ADL's. Pt is not able to carryout necessary vocational tasks. Patient continues to requires cues and skilled supervision to complete HEP      Plan:  Continue with plan of care 1-2 x week x 8 weeks  during the certification period from  1/29/18 to 3/26/18  in pursuit of  OT goals.  Pt to f/u with Dr. Simpson 2/16/18.

## 2018-02-21 ENCOUNTER — CLINICAL SUPPORT (OUTPATIENT)
Dept: REHABILITATION | Facility: HOSPITAL | Age: 73
End: 2018-02-21
Payer: MEDICARE

## 2018-02-21 DIAGNOSIS — Z98.890 S/P RIGHT ROTATOR CUFF REPAIR: ICD-10-CM

## 2018-02-21 DIAGNOSIS — M79.601 RIGHT ARM PAIN: Primary | ICD-10-CM

## 2018-02-21 DIAGNOSIS — M25.60 RANGE OF MOTION DEFICIT: ICD-10-CM

## 2018-02-21 PROCEDURE — 97110 THERAPEUTIC EXERCISES: CPT | Mod: PN

## 2018-02-21 NOTE — PROGRESS NOTES
Patient:  Sharita Gonzalez  [facility] #:  [id number]   Date of Note: 2/21/2018  Referring Physician: La Gallegos PA-C    Diagnosis:    Encounter Diagnoses   Name Primary?    Right arm pain Yes    S/P right rotator cuff repair     Range of motion deficit        Start Time: 8:30 am  End Time: 9:20 am  Total Time: 50 min  40 min direct care  Group Time: -    6 of 20 visits expires 12/31/18  Surgery 1/3/18  7 weeks days post op    Subjective:   Pt reports his shoulder is a little sore but no pain.  Pain: 3 out of 10 soreness    Objective:   Patient seen by OT this session. Treatment  consist of the following:  Therapeutic exercises    Treatment: Therapeutic exercises x 40 min  Pt performed self assisted pulley for scaption and flexion  with gradual increase in motion x 3 min each.  Pt rec'd passive ROM into all planes with soft end feel noted at all end ranges. Scapular mobilization performed.  Scapular protraction, with hands on to control elevation of scapula, x 20 reps. Active supine ER to IR x 10 reps.  Supine active right shoulder flexion x 10 reps. Prone rows performed x 2 sets of 10 reps.  Prone shoulder extension with ER of arm for activation of scapula x 2 sets of 10 reps.  Sidelying eccentric abduction-therapist assist to 90* abduction x 10. Isolated left sidelying scapular retraction x 20 reps axilla towel roll. Seated dowel flexion with scapular setting x 2 sets of 10 achieving approximately  60-70* flexion,  ER x 20 reps with towel roll. Ice to right shoulder x 8 min.     Plan of care rec'd from Dr. Simpson for care from 1/29/18 to 3/26/18.    Assessment:  Pt progressing well with exercises, pt has typical scapular compensation with elevation of arm against gravity.  No increase in shoulder pain after ex.  Pt will continue to benefit from skilled OT intervention. Medical necessity is demonstrated by: Pt continues to be limited in functional and leisurely pursuits. Pain limits patients participation in  ADL's. Pt is not able to carryout necessary vocational tasks. Patient continues to requires cues and skilled supervision to complete HEP      Plan:  Continue with plan of care 1-2 x week x 8 weeks  during the certification period from  1/29/18 to 3/26/18  in pursuit of  OT goals.  Pt to f/u with Dr. Simpson 2/16/18.

## 2018-02-26 ENCOUNTER — CLINICAL SUPPORT (OUTPATIENT)
Dept: REHABILITATION | Facility: HOSPITAL | Age: 73
End: 2018-02-26
Payer: MEDICARE

## 2018-02-26 DIAGNOSIS — M25.60 RANGE OF MOTION DEFICIT: ICD-10-CM

## 2018-02-26 DIAGNOSIS — M79.601 RIGHT ARM PAIN: Primary | ICD-10-CM

## 2018-02-26 DIAGNOSIS — Z98.890 S/P RIGHT ROTATOR CUFF REPAIR: ICD-10-CM

## 2018-02-26 PROCEDURE — 97110 THERAPEUTIC EXERCISES: CPT | Mod: PN

## 2018-02-26 NOTE — PROGRESS NOTES
Patient:  Sharita Gonzalez  [facility] #:  [id number]   Date of Note: 2/26/2018  Referring Physician: La Gallegos PA-C    Diagnosis:    Encounter Diagnoses   Name Primary?    Right arm pain Yes    S/P right rotator cuff repair     Range of motion deficit      Start Time: 9:35  am  End Time: 10:15 am  Total Time: 40 min  32 min direct care  Group Time: -    7 of 20 visits expires 12/31/18  Surgery 1/3/18  7 weeks days post op    Subjective:   Pt reports he is not using his sling anymore it feels okay without it. Pt states he is shaving with his right hand now.  Pain: 2-3 out of 10 soreness    Objective:   Patient seen by OT this session. Treatment  consist of the following:  Therapeutic exercises    Treatment: Therapeutic exercises x 32 min  Pt rec'd passive ROM into all planes with soft end feel noted at all end ranges, achievement of approx 130 flexion, ER 45-50, IR 45.  Scapular protraction in supine x 20 reps f/b ER/IR in supine x 20 reps Supine active right shoulder flexion x 10 reps. ER in sidelying x 2 sets of 20 reps.  Sidelying eccentric abduction-therapist assist to 90* abduction x 10. Prone shoulder extension with ER of arm for activation of scapula x 20. Prone rows x 10 reps.  Pt engaged in scapular stabilization with varinder writing numbers 1-20 on table. Ice to right shoulder x 8 min.     Plan of care rec'd from Dr. Simpson for care from 1/29/18 to 3/26/18.    Assessment:  Pt tolerated exercises well.   Pt will continue to benefit from skilled OT intervention. Medical necessity is demonstrated by: Pt continues to be limited in functional and leisurely pursuits. Pain limits patients participation in ADL's. Pt is not able to carryout necessary vocational tasks. Patient continues to requires cues and skilled supervision to complete HEP      Plan:  Continue with plan of care 1-2 x week x 8 weeks  during the certification period from  1/29/18 to 3/26/18  in pursuit of  OT goals.  Pt to f/u with Dr. Simpson  2/16/18.

## 2018-02-28 ENCOUNTER — CLINICAL SUPPORT (OUTPATIENT)
Dept: REHABILITATION | Facility: HOSPITAL | Age: 73
End: 2018-02-28
Payer: MEDICARE

## 2018-02-28 DIAGNOSIS — M79.601 RIGHT ARM PAIN: Primary | ICD-10-CM

## 2018-02-28 DIAGNOSIS — M25.60 RANGE OF MOTION DEFICIT: ICD-10-CM

## 2018-02-28 DIAGNOSIS — Z98.890 S/P RIGHT ROTATOR CUFF REPAIR: ICD-10-CM

## 2018-02-28 PROCEDURE — 97110 THERAPEUTIC EXERCISES: CPT | Mod: PN

## 2018-02-28 NOTE — PLAN OF CARE
Progress Note/ Plan of Care    Patient:  Sharita Gonzalez  [facility] #:  [id number]   Date of Note: 2/28/2018  Referring Physician: La Gallegos PA-C    Diagnosis:    Encounter Diagnoses   Name Primary?    Right arm pain Yes    S/P right rotator cuff repair     Range of motion deficit       Start Time: 9:10 am  End Time: 9:59 am  Total Time: 49 min   Group Time: -    8 of 20 visits expires 12/31/18  Surgery 1/3/18  8 weeks days post op    Subjective:   Pt reports his shoulder is feeling sore. Pt states he is able to put on a slip over shirt now, shaving with his right hand as well.  Pain: 3-4 out of 10 soreness pre therapy      Objective:   Patient seen by OT this session. Treatment  consist of the following:  Therapeutic exercises    Treatment: Therapeutic exercises x 41 min    Pt rec'd passive ROM into all planes with soft end feel noted at all end ranges.  Scapular protraction in supine x 20 reps f/b supine flexion  x 20 reps.  Supine active right shoulder flexion x 10 reps. ER in sidelying x 2 sets of 20 reps.  Sidelying eccentric abduction-therapist assist to 90* abduction x 10. Prone shoulder extension with ER of arm for activation of scapula x 20. Prone rows x 10 reps.    Re-Assessment:    Right shoulder AROM  Flexion 70(-5)  Abduction 75(+10  ER 40(+10)  IR 60(+10) to glut  H. Adduction 20(+5)      Pt engaged in scapular stabilization with varinder writing the alphabe on table. Ice to right shoulder x 8 min.   Pt instructed to begin to increase his reps with supine flexion up to 40 as well as to perform wall scapular retractions keeping in mind to retract down and not up with upper traps.      Assessment:  Pt is reporting increased functional use of right hand with self care tasks    Goals to be met in 8 weeks:  1) Pt will be independent and report performing HEP to maximize (R) shoulder functional capacity- continuous.  2) Pt will increase shoulder PROM in all measured planes of motion by 20 degrees to A with  daily task - met  3) Pt will report use of home modalities for pain management - continuous  4) Pt will report 1 of 10 pain with use of RUE- not met  5) Pt will report interrupted sleep no more than twice a week- not met  6) Assess AROM when appropriate- met  Additional goals:  Pt will increase active right shoulder elevation to 120* to allow for overhead reaching  Assess strength when appropriate    Pt will continue to benefit from skilled OT intervention. Medical necessity is demonstrated by: Pt continues to be limited in functional and leisurely pursuits. Pain limits patients participation in ADL's. Pt is not able to carryout necessary vocational tasks. Patient continues to requires cues and skilled supervision to complete HEP      Plan:  Sharita to be treated by Occupational Therapy 1-2 times per week(due to finances) for 8 weeks to achieve the established goals during certification period of 2/28/18 to 4/25/18.   Treatment to include progression through protocol from passive to strengthening, modalities for pain, education as well as any other treatments deemed necessary based on the patient's needs or progress.            I certify the need for these services furnished under this plan of treatment and while under my care     ____________________________________  Physician/Referring Practitioner     _______________  Date of Signature

## 2018-03-02 ENCOUNTER — TELEPHONE (OUTPATIENT)
Dept: ORTHOPEDICS | Facility: CLINIC | Age: 73
End: 2018-03-02

## 2018-03-02 DIAGNOSIS — R20.0 NUMBNESS: Primary | ICD-10-CM

## 2018-03-02 NOTE — TELEPHONE ENCOUNTER
Spoke w/pt. He states the numbness wakes him up about 10-12 times a night. He thinks it may be coming from his neck but not sure. He states he is willing to do the EMG La mentioned at his last visit. Will contact neuro to see if EMG can be done before his next appt.     Msg sent to Bryon at [facility] and she states she will check to see if she can schedule EMG before 3/28/18.

## 2018-03-02 NOTE — TELEPHONE ENCOUNTER
----- Message from Valorie Martines sent at 3/2/2018 11:31 AM CST -----  Contact: POONAM SHARPE [[id number]]  _x  1st Request  _  2nd Request  _  3rd Request        Who:  POONAM SHARPE [[id number]]    Why: Patient would like to speak with someone regarding numbness in both hands states the numbness keeps him up at night and he is unable to sleep. Please return the call at earliest convenience. Thanks!    What Number to Call Back: [phone]      When to Expect a call back: (Within 24 hours)

## 2018-03-05 ENCOUNTER — CLINICAL SUPPORT (OUTPATIENT)
Dept: REHABILITATION | Facility: HOSPITAL | Age: 73
End: 2018-03-05
Payer: MEDICARE

## 2018-03-05 DIAGNOSIS — M79.601 RIGHT ARM PAIN: Primary | ICD-10-CM

## 2018-03-05 DIAGNOSIS — Z98.890 S/P RIGHT ROTATOR CUFF REPAIR: ICD-10-CM

## 2018-03-05 DIAGNOSIS — M25.60 RANGE OF MOTION DEFICIT: ICD-10-CM

## 2018-03-05 PROCEDURE — 97110 THERAPEUTIC EXERCISES: CPT | Mod: PN

## 2018-03-07 ENCOUNTER — CLINICAL SUPPORT (OUTPATIENT)
Dept: REHABILITATION | Facility: HOSPITAL | Age: 73
End: 2018-03-07
Payer: MEDICARE

## 2018-03-07 DIAGNOSIS — M25.60 RANGE OF MOTION DEFICIT: ICD-10-CM

## 2018-03-07 DIAGNOSIS — Z98.890 S/P RIGHT ROTATOR CUFF REPAIR: ICD-10-CM

## 2018-03-07 DIAGNOSIS — M79.601 RIGHT ARM PAIN: Primary | ICD-10-CM

## 2018-03-07 PROCEDURE — 97110 THERAPEUTIC EXERCISES: CPT | Mod: PN

## 2018-03-07 PROCEDURE — G8988 SELF CARE GOAL STATUS: HCPCS | Mod: CK,PN

## 2018-03-07 PROCEDURE — G8987 SELF CARE CURRENT STATUS: HCPCS | Mod: CK,PN

## 2018-03-07 NOTE — PROGRESS NOTES
Patient:  Sharita Gonzalez  Clinic #:  [id number]   Date of Note: 3/7/2018  Referring Physician: La Gallegos PA-C    Diagnosis:    Encounter Diagnoses   Name Primary?    Right arm pain Yes    S/P right rotator cuff repair     Range of motion deficit         Start Time: 9:12  am  End Time: 9:54 am  Total Time: 39 min 31*min direct care  Group Time: -    10 of 20 visits expires 12/31/18  Surgery 1/3/18  9 weeks days post op    Subjective:   Pt reports his shoulder is sore today not sure if it was the exercises or the weather.  Pain: 4 out of 10 soreness  2 out of 10 post therapy.    Objective:   Patient seen by OT this session. Treatment  consist of the following:  Therapeutic exercises    Treatment: Therapeutic exercises x 31 min  Pt engaged in therapy ball rotations in CCW/CW x 3 cycles. Pt rec'd passive ROM into all planes with soft end feel noted at all end ranges. Initiation of IR /ER isometrics x 10 reps.   Active scapular protraction x 10 reps f/b sidelying scapular retraction.  ER performed in sidelying x 10 reps.  Prone extension with ER alternating prone rows x 10 reps each x 2 sets.   Pt engaged in non resistive pulley for scaption. Ice to right shoulder x 8 min.  Pt encouraged not to overdo his HEP    Plan of care rec'd from Dr. Simpson for care from 1/29/18 to 3/26/18.    Assessment:  Pt experiencing some soreness in the right shoulder but resolves with ice application.   G CODE TOOL: FOTO  Self care  Current Score  = 50 or 50 % impaired  improving  Goal at Discharge Score 57 or 43% impaired   Discharge status Score = - or -% impaired      Pt will continue to benefit from skilled OT intervention. Medical necessity is demonstrated by: Pt continues to be limited in functional and leisurely pursuits. Pain limits patients participation in ADL's. Pt is not able to carryout necessary vocational tasks. Patient continues to requires cues and skilled supervision to complete HEP       Plan:  Continue with plan of care 1-2 x week x 8 weeks  during the certification period from  2/28/18 to 4/25/18  in pursuit of  OT goals.  Pt to f/u with Dr. Simspon 2/16/18.

## 2018-03-12 ENCOUNTER — CLINICAL SUPPORT (OUTPATIENT)
Dept: REHABILITATION | Facility: HOSPITAL | Age: 73
End: 2018-03-12
Payer: MEDICARE

## 2018-03-12 DIAGNOSIS — M79.601 RIGHT ARM PAIN: Primary | ICD-10-CM

## 2018-03-12 DIAGNOSIS — M25.60 RANGE OF MOTION DEFICIT: ICD-10-CM

## 2018-03-12 DIAGNOSIS — Z98.890 S/P RIGHT ROTATOR CUFF REPAIR: ICD-10-CM

## 2018-03-12 PROCEDURE — 97110 THERAPEUTIC EXERCISES: CPT | Mod: PN

## 2018-03-12 NOTE — PROGRESS NOTES
Patient:  Sharita Gonzalez  [facility] #:  [id number]   Date of Note: 3/12/2018  Referring Physician: La Gallegos PA-C    Diagnosis:    Encounter Diagnoses   Name Primary?    Right arm pain Yes    S/P right rotator cuff repair     Range of motion deficit       Start Time: 9:01 am  End Time: 9:45 am  Total Time: 44 min    Group Time: -    11 of 20 visits expires 12/31/18  Surgery 1/3/18  9 weeks days post op    Subjective:   Pt reports his shoulder is still really sore today.  Pt states his shoulder, bicep and shoulder blade are sore.  Pain: 5 out of 10 soreness  Improved  post therapy no rating reported.    Objective:   Patient seen by OT this session. Treatment  consist of the following:  Therapeutic exercises    Treatment: Therapeutic exercises x 36 min  Pt engaged in non resistive pulley stretch for scaption x 5 min. Right scapular mobilization performed as well as brief soft tissue to rhomboids.   Passsive shoulder ROM into all planes with slight discomfort end range ER.   Pt engaged in supine dowel flexion x 15 reps. Active supine flexion with mid range pain improved with reps but still remained. Eccentric lowering arm of right arm from 90* x 10 reps.  Prabha scapular stabilization with large therapy ball standing in forward flexed position x 10 CCW/CW circles. Pt rec'd ice x 8 min after ex.     Pt was instructed to perform only 20 reps of his exercise and only supine exercises f/b ice application. Pt expressed understanding of HEP changes.    Plan of care rec'd from Dr. Simpson for care from 1/29/18 to 3/26/18.    Assessment:  Pt with significant increase in shoulder/scapular pain, pt possibly over exercising at home with minimal ice application.      Pt will continue to benefit from skilled OT intervention. Medical necessity is demonstrated by: Pt continues to be limited in functional and leisurely pursuits. Pain limits patients participation in ADL's. Pt is not able to carryout necessary vocational tasks.  Patient continues to requires cues and skilled supervision to complete HEP      Plan:  Continue with plan of care 1-2 x week x 8 weeks  during the certification period from  2/28/18 to 4/25/18  in pursuit of  OT goals.  Pt to f/u with Dr. Simpson 2/16/18.

## 2018-03-14 ENCOUNTER — CLINICAL SUPPORT (OUTPATIENT)
Dept: REHABILITATION | Facility: HOSPITAL | Age: 73
End: 2018-03-14
Payer: MEDICARE

## 2018-03-14 DIAGNOSIS — M25.60 RANGE OF MOTION DEFICIT: ICD-10-CM

## 2018-03-14 DIAGNOSIS — Z98.890 S/P RIGHT ROTATOR CUFF REPAIR: ICD-10-CM

## 2018-03-14 DIAGNOSIS — M79.601 RIGHT ARM PAIN: Primary | ICD-10-CM

## 2018-03-14 PROCEDURE — 97110 THERAPEUTIC EXERCISES: CPT | Mod: PN

## 2018-03-14 NOTE — PROGRESS NOTES
Patient:  Sharita Gonzalez  [facility] #:  [id number]   Date of Note: 3/14/2018  Referring Physician: La Gallegos PA-C Dr. Sisco-Wise  Diagnosis:    Encounter Diagnoses   Name Primary?    Right arm pain Yes    S/P right rotator cuff repair     Range of motion deficit       Start Time: 9:00 am  End Time: 9:40 am  Total Time: 40 min  32 min direct care  Group Time: -    12 of 20 visits expires 12/31/18  Surgery 1/3/18  9 weeks days post op    Subjective:   Pt reports his shoulder is feeling a little better.  Pt states he did not overdo his HEP.  Pain: 3 out of 10 pre therapy .    Objective:   Patient seen by OT this session. Treatment  consist of the following:  Therapeutic exercises    Treatment: Therapeutic exercises x 32 min  Pt engaged in non resistive pulley stretch for scaption x 5 min. Right scapular mobilization performed as well as brief soft tissue to rhomboids, levator and upper trap.   Passsive shoulder ROM into all planes with no resistance to motion.  Supine ER of right shoulder x 10 reps in scapula plane.  Pt performed eccentric lowering of arm from flexion with resistance applied by therapist x 10 reps. Active supine flexion x 5 reps with less midrange pain reported.   Pt engaged in supine dowel flexion x 10 reps.   Prabha scapular stabilization with  ball on table x 3 sets of 10 CCW/CW circles. Pt rec'd ice x 8 min after ex.     Information provided to acquire and over the door pulley for home use    Plan of care rec'd from Dr. Simpson for care from 1/29/18 to 3/26/18.    Assessment:  Pt with improvement in his pain since last visit. Pt is being compliant with reduction in his exercises at home.     Pt will continue to benefit from skilled OT intervention. Medical necessity is demonstrated by: Pt continues to be limited in functional and leisurely pursuits. Pain limits patients participation in ADL's. Pt is not able to carryout necessary vocational tasks. Patient continues to requires cues and  skilled supervision to complete HEP      Plan:  Continue with plan of care 1-2 x week x 8 weeks  during the certification period from  2/28/18 to 4/25/18  in pursuit of  OT goals.  Pt to f/u with Dr. Simpson 2/16/18.

## 2018-03-19 ENCOUNTER — CLINICAL SUPPORT (OUTPATIENT)
Dept: REHABILITATION | Facility: HOSPITAL | Age: 73
End: 2018-03-19
Payer: MEDICARE

## 2018-03-19 ENCOUNTER — TELEPHONE (OUTPATIENT)
Dept: NEUROLOGY | Facility: CLINIC | Age: 73
End: 2018-03-19

## 2018-03-19 DIAGNOSIS — Z98.890 S/P RIGHT ROTATOR CUFF REPAIR: ICD-10-CM

## 2018-03-19 DIAGNOSIS — M25.60 RANGE OF MOTION DEFICIT: ICD-10-CM

## 2018-03-19 DIAGNOSIS — M79.601 RIGHT ARM PAIN: Primary | ICD-10-CM

## 2018-03-19 PROCEDURE — 97110 THERAPEUTIC EXERCISES: CPT | Mod: PN

## 2018-03-19 NOTE — PROGRESS NOTES
Patient:  Sharita Gonzalez  [facility] #:  [id number]   Date of Note: 3/19/2018  Referring Physician: La Gallegos PA-C Dr. Sisco-Wise  Diagnosis:    Encounter Diagnoses   Name Primary?    Right arm pain Yes    S/P right rotator cuff repair     Range of motion deficit       Start Time: 8:53 am  End Time: 9:40 am  Total Time: 53 min  45 min direct care  Group Time: -    13 of 20 visits expires 12/31/18  Surgery 1/3/18  10 weeks 5  days post op    Subjective:   Pt reports his shoulder is doing better and is less sore.  Pain: 2-3 out of 10 pre therapy .    Objective:   Patient seen by OT this session. Treatment  consist of the following:  Therapeutic exercises    Treatment: Therapeutic exercises x 45 min  Pt performed sci fit with no resistance in forward direction only with pt reporting some popping in the back of his shoulder at times. Pt rec'd passive stretch of right shoulder with soft end ranges and full flexion and ER noted. Manual resisted eccentric lowering of RUE from 90 flexion x 20 reps. Supine chest press with 1# dowel x 20 reps..  Pt engaged in supine dowel scapular protraction x 20 reps.   Active supine flexion x 5 reps with less midrange pain reported.   Prabha scapular stabilization with  ball on table x 2 sets of 10 CCW/CW circles. Pt engaged in angled slides on slide board x 2 sets of 10 reps. Pt engaged in ER with scapular retraction x 10 reps.  Pt rec'd ice x 8 min after ex.     Plan of care rec'd from Dr. Simpson for care from 1/29/18 to 3/26/18.    Assessment:  Pt with less pain in his shoulder but clicking remains intermittently in posterior shoulder region.   Pt will continue to benefit from skilled OT intervention. Medical necessity is demonstrated by: Pt continues to be limited in functional and leisurely pursuits. Pain limits patients participation in ADL's. Pt is not able to carryout necessary vocational tasks. Patient continues to requires cues and skilled supervision to complete HEP       Plan:  Continue with plan of care 1-2 x week x 8 weeks  during the certification period from  2/28/18 to 4/25/18  in pursuit of  OT goals.  Pt to f/u with Dr. Simpson 2/16/18.

## 2018-03-21 ENCOUNTER — CLINICAL SUPPORT (OUTPATIENT)
Dept: REHABILITATION | Facility: HOSPITAL | Age: 73
End: 2018-03-21
Payer: MEDICARE

## 2018-03-21 DIAGNOSIS — M79.601 RIGHT ARM PAIN: Primary | ICD-10-CM

## 2018-03-21 DIAGNOSIS — M25.60 RANGE OF MOTION DEFICIT: ICD-10-CM

## 2018-03-21 DIAGNOSIS — Z98.890 S/P RIGHT ROTATOR CUFF REPAIR: ICD-10-CM

## 2018-03-21 PROCEDURE — 97110 THERAPEUTIC EXERCISES: CPT | Mod: PN

## 2018-03-21 NOTE — PROGRESS NOTES
Patient:  Sharita Gonzalez  [facility] #:  [id number]   Date of Note: 3/21/2018  Referring Physician: La Gallegos PA-C Dr. Sisco-Wise  Diagnosis:    Encounter Diagnoses   Name Primary?    Right arm pain Yes    S/P right rotator cuff repair     Range of motion deficit       Start Time:  9:10  am  End Time: 9:40 am  Total Time: 30 min  Group Time: -    14 of 20 visits expires 12/31/18  Surgery 1/3/18  11 weeks 0 days post op    Subjective:   Pt reports he irritated his shoulder pulling and extension cord from a plug, it was stuck.  Pain: 3-4 out of 10 pre therapy .    Objective:   Patient seen by OT this session. Treatment  consist of the following:  Therapeutic exercises    Treatment: Therapeutic exercises x 30 min  Pt engage non resistive pulley stretches for scaption x 4 min. Pt rec'd passive stretch of right shoulder with initially tightness with end range flexion and ER which reduced with increased reps. Manual resisted eccentric lowering of RUE from 90 flexion x 10 reps with mid range pain. Supine chest press with 1# dowel x 20 reps. Scapular protraction with dowel x 10.    Active dowel supine flexion x 10 reps. Prone rows x 2 sets of 10 reps.   Prabha scapular stabilization with  ball on table x 3 sets of 10 CCW/CW circles.  Pt rec'd ice x 8 min after ex.     Plan of care rec'd from Dr. Simpson for care from 1/29/18 to 3/26/18.    Assessment:  Pt with set back due to irritating his shoulder at home.  Pt continues with eccentric pain which improved some with therapy.  Pt will continue to benefit from skilled OT intervention. Medical necessity is demonstrated by: Pt continues to be limited in functional and leisurely pursuits. Pain limits patients participation in ADL's. Pt is not able to carryout necessary vocational tasks. Patient continues to requires cues and skilled supervision to complete HEP      Plan:  Continue with plan of care 1-2 x week x 8 weeks  during the certification period from  2/28/18 to  4/25/18  in pursuit of  OT goals.  Pt to f/u with Dr. Simpson 2/16/18.

## 2018-03-22 ENCOUNTER — PROCEDURE VISIT (OUTPATIENT)
Dept: NEUROLOGY | Facility: CLINIC | Age: 73
End: 2018-03-22
Payer: MEDICARE

## 2018-03-22 DIAGNOSIS — R20.0 NUMBNESS: ICD-10-CM

## 2018-03-22 PROCEDURE — 95886 MUSC TEST DONE W/N TEST COMP: CPT | Mod: S$GLB,,, | Performed by: PSYCHIATRY & NEUROLOGY

## 2018-03-22 PROCEDURE — 95913 NRV CNDJ TEST 13/> STUDIES: CPT | Mod: S$GLB,,, | Performed by: PSYCHIATRY & NEUROLOGY

## 2018-03-26 ENCOUNTER — CLINICAL SUPPORT (OUTPATIENT)
Dept: REHABILITATION | Facility: HOSPITAL | Age: 73
End: 2018-03-26
Payer: MEDICARE

## 2018-03-26 DIAGNOSIS — M79.601 RIGHT ARM PAIN: Primary | ICD-10-CM

## 2018-03-26 DIAGNOSIS — Z98.890 S/P RIGHT ROTATOR CUFF REPAIR: ICD-10-CM

## 2018-03-26 DIAGNOSIS — M25.60 RANGE OF MOTION DEFICIT: ICD-10-CM

## 2018-03-26 PROCEDURE — G8988 SELF CARE GOAL STATUS: HCPCS | Mod: CK,PN

## 2018-03-26 PROCEDURE — G8987 SELF CARE CURRENT STATUS: HCPCS | Mod: CK,PN

## 2018-03-26 PROCEDURE — 97110 THERAPEUTIC EXERCISES: CPT | Mod: PN

## 2018-03-26 NOTE — PROGRESS NOTES
Progress Note    Patient:  Sharita Gonzalez  [facility] #:  [id number]   Date of Note: 3/26/2018  Referring Physician: La Gallegos PA-C Dr. Sisco-Wise  Diagnosis:    Encounter Diagnoses   Name Primary?    Right arm pain Yes    S/P right rotator cuff repair     Range of motion deficit         Start Time:  10:15  am  End Time: 11:05 am  Total Time: 50  min 42 min direct care    15 of 20 visits expires 12/31/18  Surgery 1/3/18  11 weeks 5 days post op    Subjective:   Pt reports his shoulder is feeling sore. Pt states he is able to put on a slip over shirt now, shaving with his right hand as well.  Pain: 2-3 out of 10 soreness with movement- no pain at rest         Objective:   Patient seen by OT this session. Treatment  consist of the following:  Therapeutic exercises     Treatment: Therapeutic exercises x 42 min      Re-Assessment:     Right shoulder                AROM ( upright)     (supine)  Flexion 50(-20)                     140  Abduction 50(-25)                105  Extension 60  ER 50(+10)                           60  IR 65(+5) to waist  H. Adduction 10(-10)     Non resistive pulley for scaption performed as warm up to stretching. Pt rec'd passive ROM into all planes with soft end feel noted at all end ranges.  Scapular protraction in supine x 10 reps f/b dowel supine protraction x 10 reps to assist with pain control.  Supine active flexion  x 10 reps.  ER in sidelying x 2 sets of 10 reps. Horine scapular stabilization on table 3 sets of CCW/CW circles.  Pt engaged in Horine scap writing numbers 1-20 on table top.  Prone shoulder extension with ER of arm for activation of scapula x 10 reps. Prone rows horizontal abduction x 10 reps.  Scapular depression with yellow theraband sling x 10 reps f/b 10 reps of active ER with scapular retraction.  Ice applied after ex.     Education re: the use of his pulley system to reduce the soreness in his shoulder muscles as well as ice application.    Assessment:  Pt is  demonstrating a decrease in his AROM against gravity due to soreness in his shoulder.  Pt is able to perform supine flexion with little to no mid range eccentric pain.  Pt continues with limited resistive functional use of right arm.      Goals to be met in 8 weeks:  1) Pt will be independent and report performing HEP to maximize (R) shoulder functional capacity- continuous.  2) Pt will increase active right shoulder elevation to 120* to allow for overhead reaching- not met  3) Pt will report use of home modalities for pain management - continuous  4) Pt will report 1 of 10 pain with use of RUE- met at times  5) Pt will report interrupted sleep no more than twice a week- not met  6) Assess strength when appropriate- not met    G CODE TOOL: FOTO  Self care  Current Score  = 53 or 47 % impaired  improved 3%  Goal at Discharge Score 57 or 43% impaired   Discharge status Score = - or -% impaired     Goals remain appropriate     Pt will continue to benefit from skilled OT intervention. Medical necessity is demonstrated by: Pt continues to be limited in functional and leisurely pursuits. Pain limits patients participation in ADL's. Pt is not able to carryout necessary vocational tasks. Patient continues to requires cues and skilled supervision to complete HEP                 Plan:  Continue with plan of care 1-2 x week x 8 weeks  during the certification period from  2/28/18 to 4/25/18  in pursuit of  OT goals.  Pt to f/u with Dr. Simpson 3/28/18.

## 2018-03-28 ENCOUNTER — OFFICE VISIT (OUTPATIENT)
Dept: ORTHOPEDICS | Facility: CLINIC | Age: 73
End: 2018-03-28
Payer: MEDICARE

## 2018-03-28 VITALS
SYSTOLIC BLOOD PRESSURE: 135 MMHG | DIASTOLIC BLOOD PRESSURE: 83 MMHG | BODY MASS INDEX: 30.16 KG/M2 | WEIGHT: 181 LBS | HEART RATE: 72 BPM | HEIGHT: 65 IN

## 2018-03-28 DIAGNOSIS — M65.322 TRIGGER FINGER, LEFT INDEX FINGER: ICD-10-CM

## 2018-03-28 DIAGNOSIS — G56.02 CARPAL TUNNEL SYNDROME, LEFT: Primary | ICD-10-CM

## 2018-03-28 DIAGNOSIS — M75.101 TEAR OF RIGHT ROTATOR CUFF, UNSPECIFIED TEAR EXTENT: Primary | ICD-10-CM

## 2018-03-28 DIAGNOSIS — G56.03 BILATERAL CARPAL TUNNEL SYNDROME: ICD-10-CM

## 2018-03-28 DIAGNOSIS — G56.01 CARPAL TUNNEL SYNDROME, RIGHT: ICD-10-CM

## 2018-03-28 DIAGNOSIS — Z98.890 POST-OPERATIVE STATE: ICD-10-CM

## 2018-03-28 PROCEDURE — 99999 PR PBB SHADOW E&M-EST. PATIENT-LVL III: CPT | Mod: PBBFAC,,, | Performed by: PHYSICIAN ASSISTANT

## 2018-03-28 PROCEDURE — 99024 POSTOP FOLLOW-UP VISIT: CPT | Mod: S$GLB,,, | Performed by: PHYSICIAN ASSISTANT

## 2018-03-28 RX ORDER — GABAPENTIN 100 MG/1
300 CAPSULE ORAL NIGHTLY
COMMUNITY
End: 2024-02-23 | Stop reason: SDUPTHER

## 2018-03-28 NOTE — PROGRESS NOTES
"Mr. Gonzalez is here today for a post-operative visit.  He is 12 wks status post right shoulder arthroscopy, rotator cuff repair, and debridement of the rotator cuff by Dr. Simpson on 1/3/18. He reports that he is doing well. Does not complain of pain, does have occasional soreness, not taking pain medication. He reports improvement in ROM with therapy though still limited compared to left.  He denies fever, chills, and sweats since the time of the surgery.      Also presents for EMG results. He notes intermittent numbness and tingling of bl hands in the median nerve distribution. We had discussed this at last visit but he did not wish to investigate at that time. However, since I last saw him he began having increased pain, has difficulty sleeping due to symptoms. He did decide to obtain EMG which shows bl severe carpal tunnel syndrome. His PCP gave him Gabapentin which does help him sleep some.     Also, since last visit he notes beginning of a left index trigger finger. It is not locking but is clicking/popping and is tender over the area of the A1 pulley. He has had multiple trigger finger releases in the past by Dr. Simpson.     Physical exam:    Vitals:    03/28/18 1322   BP: 135/83   Pulse: 72   Weight: 82.1 kg (181 lb)   Height: 5' 5" (1.651 m)   PainSc: 0-No pain   PainLoc: Shoulder     Vital signs are stable, patient is afebrile.  Patient is well dressed and well groomed, no acute distress.  Alert and oriented to person, place, and time.  RUE:  Incisions are well healed. There is no sign of any infection. He is NVI. Fair shoulder AROM, better passively. Good ROM of the elbow, wrist, and fingers. He has positive tinels at the wrist, positive durkans.     LUE:  Good ROM of the wrist and fingers. There is clicking of the index. He has ttp over the A1 pulley, small nodule palpated. NVI. Positive tinels at the wrist, positive durkans.     Assessment: 12 wks status post right shoulder arthroscopy, rotator " cuff repair, and debridement of the rotator cuff    Plan:  Sharita was seen today for pain.    Diagnoses and all orders for this visit:    Tear of right rotator cuff, unspecified tear extent    Post-operative state    Bilateral carpal tunnel syndrome        -continue PT- ROM, can begin strengthening   -discussed treatment options for carpal tunnel. Recommend surgical release due to severity. He wishes to proceed with left carpal tunnel release, at the same time he wishes to also have left index trigger finger release as well as a right carpal tunnel injection. Consents were reviewed and signed in clinic today. All questions answered.   -RTC post op, also in 3 mos for 6 mo RCR post op       La Gallegos PA-C  [facility]   [facility]  [place], [place]

## 2018-04-02 ENCOUNTER — CLINICAL SUPPORT (OUTPATIENT)
Dept: REHABILITATION | Facility: HOSPITAL | Age: 73
End: 2018-04-02
Payer: MEDICARE

## 2018-04-02 DIAGNOSIS — M79.601 RIGHT ARM PAIN: Primary | ICD-10-CM

## 2018-04-02 DIAGNOSIS — M25.60 RANGE OF MOTION DEFICIT: ICD-10-CM

## 2018-04-02 DIAGNOSIS — Z98.890 S/P RIGHT ROTATOR CUFF REPAIR: ICD-10-CM

## 2018-04-02 PROCEDURE — 97110 THERAPEUTIC EXERCISES: CPT | Mod: PN

## 2018-04-02 NOTE — PROGRESS NOTES
Patient:  Sharita Gonzalez  [facility] #:  [id number]   Date of Note: 4/2/2018  Referring Physician: La Gallegos PA-C Dr. Sisco-Wise  Diagnosis:    Encounter Diagnoses   Name Primary?    Right arm pain Yes    S/P right rotator cuff repair     Range of motion deficit       Start Time:  10:00  am  End Time: 10:48 am  Total Time: 48 min    Group Time: -    16 of 20 visits expires 12/31/18  Surgery 1/3/18  12 weeks 0 days post op    Subjective:   Pt reports his shoulder is feeling better, but they really didn't look at his shoulder they were more concerned about scheduling surgery for my hand.  Pain: 1-2 out of 10 pre therapy .    Objective:   Patient seen by OT this session. Treatment  consist of the following:  Therapeutic exercises    Treatment: Therapeutic exercises x 40 min  Sci fit with BUE instructed for forward direction only. Pt rec'd passive stretch of right shoulder with minimal to no tightness.   Pt engaged in supine active flexion x 25 reps.   Supine scapular protraction with 2# x 2 sets of 10 reps.    Sidelying ER(towel) x 20 reps.    Attempts to perform gator chomps in left sidelying with increase in pain.   Supine yellow band horizontal abduction x 2 sets of 10 reps.    Varinder scapular stabilization with  ball on table x 3 sets of 10 CCW/CW circles.  Gustavus attempted on wall with increase in pain  Scapular activation with back against wall pushing small balls in wall x 20 reps  Large therapy ball on floor for varinder stabilization x 3 sets of CCW/CW circles      Pt rec'd ice x 8 min after ex.   Pt provided with yellow band and written instructions for his HEP of supine h. Abduction, as well as supine flexion 100 reps per day and sidelying ER x 20 reps.  Plan of care rec'd from Dr. Simpson for care from 1/29/18 to 3/26/18.    Assessment:  Pt with less overall discomfort today, improved tolerance to some light resistive ex.  Pt expressed understanding of his HEP. Pt will continue to benefit from skilled OT  intervention. Medical necessity is demonstrated by: Pt continues to be limited in functional and leisurely pursuits. Pain limits patients participation in ADL's. Pt is not able to carryout necessary vocational tasks. Patient continues to requires cues and skilled supervision to complete HEP      Plan:  Continue with plan of care 1-2 x week x 8 weeks  during the certification period from  2/28/18 to 4/25/18  in pursuit of  OT goals.  Pt to f/u with Dr. Simpson 2/16/18.

## 2018-04-04 ENCOUNTER — CLINICAL SUPPORT (OUTPATIENT)
Dept: REHABILITATION | Facility: HOSPITAL | Age: 73
End: 2018-04-04
Payer: MEDICARE

## 2018-04-04 DIAGNOSIS — Z98.890 S/P RIGHT ROTATOR CUFF REPAIR: ICD-10-CM

## 2018-04-04 DIAGNOSIS — M25.60 RANGE OF MOTION DEFICIT: ICD-10-CM

## 2018-04-04 DIAGNOSIS — M79.601 RIGHT ARM PAIN: Primary | ICD-10-CM

## 2018-04-04 PROCEDURE — 97110 THERAPEUTIC EXERCISES: CPT | Mod: PN

## 2018-04-09 ENCOUNTER — CLINICAL SUPPORT (OUTPATIENT)
Dept: REHABILITATION | Facility: HOSPITAL | Age: 73
End: 2018-04-09
Payer: MEDICARE

## 2018-04-09 DIAGNOSIS — M25.60 RANGE OF MOTION DEFICIT: ICD-10-CM

## 2018-04-09 DIAGNOSIS — Z98.890 S/P RIGHT ROTATOR CUFF REPAIR: ICD-10-CM

## 2018-04-09 DIAGNOSIS — M79.601 RIGHT ARM PAIN: Primary | ICD-10-CM

## 2018-04-09 PROCEDURE — 97110 THERAPEUTIC EXERCISES: CPT | Mod: PN

## 2018-04-09 NOTE — PROGRESS NOTES
Patient:  Sharita Gonzalez  [facility] #:  [id number]   Date of Note: 4/9/2018  Referring Physician: La Gallegos PA-C Dr. Sisco-Wise  Diagnosis:    Encounter Diagnoses   Name Primary?    Right arm pain Yes    S/P right rotator cuff repair     Range of motion deficit         Start Time:  9:35  am  End Time: 10:30 am  Total Time: 55 min  40 min of direct care  Group Time: -    18 of 20 visits expires 12/31/18  Surgery 1/3/18  14 weeks  post op    Subjective:   Pt reports he feels his shoulder is doing better.  Pt states he is doing his exercise at home.  Pain: 0 out of 10 pre therapy more soreness then pain.    Objective:   Patient seen by OT this session. Treatment  consist of the following:  Therapeutic exercises    Treatment: Therapeutic exercises x 40 min  Sci fit with BUE instructed for forward direction only. Pt rec'd passive stretch of right shoulder with minimal to no tightness. Scapular mobilization and soft tissue management of rhomboids and upper traps. performed as well.   Pt engaged in supine active flexion with 1/2# x 2 sets of 10 reps.   Supine scapular protraction with 3# x 2 sets of 10 reps.    Sidelying abduction to 90 x 10 reps.    Prone h. Abduction x 2 sets of 10 reps  Prone extension with ER 3# x 2 sets of 10 reps  Prone scapular retraction x 10 reps  Yellow theraband isometric ER x 2 sets 10 reps  Matrix scapular retraction with 10# resistance x 10 reps    Pt rec'd ice x 8 min after ex.   Pt encouraged to continue with his HEP.    Plan of care rec'd from Dr. Simpson for care from 2/28/18 to 4/25/18.    Assessment:  Pt tolerated increase in resistance with prone extensions and scapular protraction.  Pt with overall less pain and soreness in his shoulder. Pt will continue to benefit from skilled OT intervention. Medical necessity is demonstrated by: Pt continues to be limited in functional and leisurely pursuits. Pain limits patients participation in ADL's. Pt is not able to carryout necessary  vocational tasks. Patient continues to requires cues and skilled supervision to complete HEP      Plan:  Continue with plan of care 1-2 x week x 8 weeks  during the certification period from  2/28/18 to 4/25/18  in pursuit of  OT goals.  Pt to f/u with Dr. Simpson 2/16/18.

## 2018-04-11 ENCOUNTER — CLINICAL SUPPORT (OUTPATIENT)
Dept: REHABILITATION | Facility: HOSPITAL | Age: 73
End: 2018-04-11
Payer: MEDICARE

## 2018-04-11 DIAGNOSIS — M25.60 RANGE OF MOTION DEFICIT: ICD-10-CM

## 2018-04-11 DIAGNOSIS — Z98.890 S/P RIGHT ROTATOR CUFF REPAIR: ICD-10-CM

## 2018-04-11 DIAGNOSIS — M79.601 RIGHT ARM PAIN: Primary | ICD-10-CM

## 2018-04-11 PROCEDURE — 97110 THERAPEUTIC EXERCISES: CPT | Mod: PN

## 2018-04-11 NOTE — PROGRESS NOTES
Patient:  Sharita Gonzalez  [facility] #:  [id number]   Date of Note: 4/11/2018  Referring Physician: La Gallegos PA-C Dr. Sisco-Wise  Diagnosis:    Encounter Diagnoses   Name Primary?    Right arm pain Yes    S/P right rotator cuff repair     Range of motion deficit         Start Time: 8:55 am  End Time: 9:45 am  Total Time: 50 min    Group Time: -    18 of 20 visits expires 12/31/18  Surgery 1/3/18  14 weeks  post op    Subjective:   Pt reports he is a little sore this morning he may have done too much yesterday.  Pain: 3-4 out of 10 pre therapy, improvement in discomfort after therapy.    Objective:   Patient seen by OT this session. Treatment  consist of the following:  Therapeutic exercises    Treatment: Therapeutic exercises x 42 min  Sci fit with BUE instructed for forward direction only. Pt rec'd passive stretch of right shoulder with minimal to no tightness. Scapular mobilization and soft tissue management of rhomboids and upper traps. performed as well.   Supine 2# tricep curls x 20 reps  Supine yellow band h. Abduction x10  Eccentric hold in supine ER yellow band knee ext/flex x 10 reps- high cues to keep arm in 90 abd/ 90 elbow flex  Prone ER with arm supported on table x 2 sets 10 reps  Prone extension with ER 2# x 2 sets of 10 reps  Supine scap stabilization arm @ 90 CCW/CW circles x 3 sets 10 reps each direction  Holdrege stabilzation table top CCW/CW 4# plyoball  Matrix rows 10# x 2 sets of 10 reps  Matrix tricep curls 32.5# x 2 sets 10 reps    Pt rec'd ice x 8 min after ex.   A review of the exercises he is performing at home with appropriate stretching with pully and active and light resistive ex.     Plan of care rec'd from Dr. Simpson for care from 2/28/18 to 4/25/18.    Assessment:  Pt with good passive motion of right shoulder in all planes.  Pt very engaged in his exercises during therapy.  Pt will continue to benefit from skilled OT intervention. Medical necessity is demonstrated by: Pt  continues to be limited in functional and leisurely pursuits. Pain limits patients participation in ADL's. Pt is not able to carryout necessary vocational tasks. Patient continues to requires cues and skilled supervision to complete HEP      Plan:  Continue with plan of care 1-2 x week x 8 weeks  during the certification period from  2/28/18 to 4/25/18  in pursuit of  OT goals.  Pt to f/u with Dr. Simpson 2/16/18.

## 2018-04-16 ENCOUNTER — CLINICAL SUPPORT (OUTPATIENT)
Dept: REHABILITATION | Facility: HOSPITAL | Age: 73
End: 2018-04-16
Payer: MEDICARE

## 2018-04-16 DIAGNOSIS — M25.60 RANGE OF MOTION DEFICIT: ICD-10-CM

## 2018-04-16 DIAGNOSIS — M79.601 RIGHT ARM PAIN: Primary | ICD-10-CM

## 2018-04-16 DIAGNOSIS — Z98.890 S/P RIGHT ROTATOR CUFF REPAIR: ICD-10-CM

## 2018-04-16 PROCEDURE — 97110 THERAPEUTIC EXERCISES: CPT | Mod: PN

## 2018-04-16 NOTE — PROGRESS NOTES
Patient:  Sharita Gonzalez  [facility] #:  [id number]   Date of Note: 4/16/2018  Referring Physician: La Gallegos PA-C Dr. Sisco-Wise  Diagnosis:    Encounter Diagnoses   Name Primary?    Right arm pain Yes    S/P right rotator cuff repair     Range of motion deficit         Start Time: 8:55 am  End Time: 9:45 am  Total Time: 50 min   Group Time: -    19 of 20 visits expires 12/31/18  Surgery 1/3/18  14 weeks  post op    Subjective:   Pt reports he feels his polymyositis may be flaring up due to pain in his side.  Pain: 2 out of 10 pre therapy, improvement in discomfort after therapy.    Objective:   Patient seen by OT this session. Treatment  consist of the following:  Therapeutic exercises    Treatment: Therapeutic exercises x 42 min  Sci fit with BUE instructed for forward direction only. Pt rec'd passive stretch of right shoulder with minimal in flexion initially. S\    Exericses as follows:  Supine flexion with 1.5# x 20 reps  Pertubations x 40 seconds for scapular stabilization in supine  Supine 2.5# tricep curls x 2 sets of 10 reps  1# supine arm in 90 flex 3 sets of 10 reps in CCW and CW rotations  Supine yellow band h. Abduction x 2 sets of 10 reps  Sidelying ER with 1/2# 2 sets of 10 reps  Pronerows 2# x 2 sets of 10 reps  Prone h. Abduction x 10 reps  Matrix rows 10# x 2 sets of 10 reps  Matrix tricep curls 35# x 2 sets 10 reps    Pt rec'd ice x 8 min after ex.   Pt encouraged to continue with his HEP.     Plan of care rec'd from Dr. Simpson for care from 2/28/18 to 4/25/18.    Assessment:  Pt tolerated resistive exercises well but continues with weakness of his right shoulder.  Pt will continue to benefit from skilled OT intervention. Medical necessity is demonstrated by: Pt continues to be limited in functional and leisurely pursuits. Pain limits patients participation in ADL's. Pt is not able to carryout necessary vocational tasks. Patient continues to requires cues and skilled supervision to  complete HEP      Plan:  Continue with plan of care 1-2 x week x 8 weeks  during the certification period from  2/28/18 to 4/25/18  in pursuit of  OT goals.  Pt to f/u with Dr. Simpson 2/16/18.

## 2018-04-18 ENCOUNTER — CLINICAL SUPPORT (OUTPATIENT)
Dept: REHABILITATION | Facility: HOSPITAL | Age: 73
End: 2018-04-18
Payer: MEDICARE

## 2018-04-18 DIAGNOSIS — Z98.890 S/P RIGHT ROTATOR CUFF REPAIR: ICD-10-CM

## 2018-04-18 DIAGNOSIS — M79.601 RIGHT ARM PAIN: Primary | ICD-10-CM

## 2018-04-18 DIAGNOSIS — M25.60 RANGE OF MOTION DEFICIT: ICD-10-CM

## 2018-04-18 PROCEDURE — G8987 SELF CARE CURRENT STATUS: HCPCS | Mod: CK,PN

## 2018-04-18 PROCEDURE — G8988 SELF CARE GOAL STATUS: HCPCS | Mod: CK,PN

## 2018-04-18 PROCEDURE — 97110 THERAPEUTIC EXERCISES: CPT | Mod: PN

## 2018-04-18 NOTE — PLAN OF CARE
Progress Note/ Plan of Care    Patient:  Sharita Gonzalez  Clinic #:  [id number]   Date of Note: 4/18/2018  Referring Physician: La Gallegos PA-C Dr. Sisco-Wise  Diagnosis:    Encounter Diagnoses   Name Primary?    Right arm pain Yes    S/P right rotator cuff repair     Range of motion deficit         Start Time:  9:00  am  End Time: 9:45 am  Total Time: 45  min     20 of 20 visits expires 12/31/18  Surgery 1/3/18  15 weeks 5 days post op    Subjective:   Pt reports he is doing his exercises at home, but it doesn't really hurt.  Pain: 2 out of 10 soreness with movement-  0 out of 10 at best , 2-3 out of 10 at worst     Objective:   Patient seen by OT this session. Treatment  consist of the following:  Therapeutic exercises     Treatment: Therapeutic exercises x 45min      Re-Assessment:     Right shoulder                AROM ( upright)     (supine)  Flexion 60(+10)                     140  Abduction 65(+15)                105  Extension 60(=)  ER 50(=)                           60  IR 65(+5) to waist  H. Adduction 10(=)    RUE strength:    Flexion/extension/adduction(all side body) 4+  ER, IR 3+/5     Exercises as follows:  Supine flexion with 1.5 # x 30 reps  Supine h. Abduction yellow theraband x 2 sets of 15 reps.  Sidelying ER with 1/2# resistance x 2 sets of 15 reps  Sidlying abduction to 90 x 2 sets 10 reps  Green theraband sling for scapular depression x 2 sets of 10 reps  Scapular stabilization with yellow theraband step outs left on plinth   Ice applied after ex.     Education re: the use of his pulley system to reduce the soreness in his shoulder muscles as well as ice application.    Assessment:  Pt is demonstrating a decrease in his AROM against gravity due to soreness in his shoulder.  Pt is able to perform supine flexion with little to no mid range eccentric pain.  Pt continues with limited resistive functional use of right arm.      Goals to be met in 8 weeks:  1) Pt will be independent and report  performing HEP to maximize (R) shoulder functional capacity- continuous.  2) Pt will increase active right shoulder elevation to 120* to allow for overhead reaching- not met  3) Pt will report use of home modalities for pain management - continuous  4) Pt will report 1 of 10 pain with use of RUE- met at times  5) Pt will report interrupted sleep no more than twice a week- met  6) Assess strength when appropriate-  met  Additional Goal   Increase RUE strength to 4+/5 to allow for household use for reaching.    G CODE TOOL: FOTO  Self care  Current Score  = 55 or 45 % impaired  improved 2%  Goal at Discharge Score 57 or 43% impaired   Discharge status Score = - or -% impaired     Goals remain appropriate     Pt will continue to benefit from skilled OT intervention. Medical necessity is demonstrated by: Pt continues to be limited in functional and leisurely pursuits. Pain limits patients participation in ADL's. Pt is not able to carryout necessary vocational tasks. Patient continues to requires cues and skilled supervision to complete HEP                 Plan:  Sharita to be treated by Occupational Therapy 1-2 times per week for 8 weeks to achieve the established goals during certification period of 4/25/18 to 6/21/18    Treatment to include progression through protocol from passive to strengthening, modalities for pain, education as well as any other treatments deemed necessary based on the patient's needs or progress.            I certify the need for these services furnished under this plan of treatment and while under my care     ____________________________________  Physician/Referring Practitioner     _______________  Date of Signature

## 2018-04-19 RX ORDER — ACETAMINOPHEN AND CODEINE PHOSPHATE 300; 30 MG/1; MG/1
1 TABLET ORAL EVERY 4 HOURS PRN
Qty: 35 TABLET | Refills: 0 | Status: SHIPPED | OUTPATIENT
Start: 2018-04-19 | End: 2018-06-27

## 2018-04-20 ENCOUNTER — ANESTHESIA EVENT (OUTPATIENT)
Dept: SURGERY | Facility: OTHER | Age: 73
End: 2018-04-20
Payer: MEDICARE

## 2018-04-20 ENCOUNTER — TELEPHONE (OUTPATIENT)
Dept: ORTHOPEDICS | Facility: CLINIC | Age: 73
End: 2018-04-20

## 2018-04-20 ENCOUNTER — HOSPITAL ENCOUNTER (OUTPATIENT)
Dept: PREADMISSION TESTING | Facility: OTHER | Age: 73
Discharge: HOME | End: 2018-04-20
Attending: ORTHOPAEDIC SURGERY
Payer: MEDICARE

## 2018-04-20 VITALS
SYSTOLIC BLOOD PRESSURE: 117 MMHG | TEMPERATURE: 99 F | OXYGEN SATURATION: 96 % | BODY MASS INDEX: 30.16 KG/M2 | WEIGHT: 181 LBS | DIASTOLIC BLOOD PRESSURE: 70 MMHG | HEIGHT: 65 IN | HEART RATE: 80 BPM

## 2018-04-20 LAB
ANION GAP SERPL CALC-SCNC: 8 MMOL/L
BASOPHILS # BLD AUTO: 0.01 K/UL
BASOPHILS NFR BLD: 0.1 %
BUN SERPL-MCNC: 20 MG/DL
CALCIUM SERPL-MCNC: 9.5 MG/DL
CHLORIDE SERPL-SCNC: 103 MMOL/L
CO2 SERPL-SCNC: 27 MMOL/L
CREAT SERPL-MCNC: 0.9 MG/DL
DIFFERENTIAL METHOD: ABNORMAL
EOSINOPHIL # BLD AUTO: 0.1 K/UL
EOSINOPHIL NFR BLD: 1.9 %
ERYTHROCYTE [DISTWIDTH] IN BLOOD BY AUTOMATED COUNT: 13.1 %
EST. GFR  (AFRICAN AMERICAN): >60 ML/MIN/1.73 M^2
EST. GFR  (NON AFRICAN AMERICAN): >60 ML/MIN/1.73 M^2
GLUCOSE SERPL-MCNC: 85 MG/DL
HCT VFR BLD AUTO: 39.2 %
HGB BLD-MCNC: 12.9 G/DL
LYMPHOCYTES # BLD AUTO: 1.4 K/UL
LYMPHOCYTES NFR BLD: 18.9 %
MCH RBC QN AUTO: 30.7 PG
MCHC RBC AUTO-ENTMCNC: 32.9 G/DL
MCV RBC AUTO: 93 FL
MONOCYTES # BLD AUTO: 0.6 K/UL
MONOCYTES NFR BLD: 8.4 %
NEUTROPHILS # BLD AUTO: 5.2 K/UL
NEUTROPHILS NFR BLD: 70.3 %
PLATELET # BLD AUTO: 238 K/UL
PMV BLD AUTO: 11.1 FL
POTASSIUM SERPL-SCNC: 4.1 MMOL/L
RBC # BLD AUTO: 4.2 M/UL
SODIUM SERPL-SCNC: 138 MMOL/L
WBC # BLD AUTO: 7.36 K/UL

## 2018-04-20 PROCEDURE — 36415 COLL VENOUS BLD VENIPUNCTURE: CPT

## 2018-04-20 PROCEDURE — 80048 BASIC METABOLIC PNL TOTAL CA: CPT

## 2018-04-20 PROCEDURE — 85025 COMPLETE CBC W/AUTO DIFF WBC: CPT

## 2018-04-20 RX ORDER — ASPIRIN 81 MG/1
81 TABLET ORAL DAILY
COMMUNITY

## 2018-04-20 RX ORDER — MIDAZOLAM HYDROCHLORIDE 5 MG/ML
2 INJECTION INTRAMUSCULAR; INTRAVENOUS
Status: CANCELLED | OUTPATIENT
Start: 2018-04-20 | End: 2018-04-20

## 2018-04-20 RX ORDER — LIDOCAINE HYDROCHLORIDE 10 MG/ML
0.5 INJECTION, SOLUTION EPIDURAL; INFILTRATION; INTRACAUDAL; PERINEURAL ONCE
Status: CANCELLED | OUTPATIENT
Start: 2018-04-20 | End: 2018-04-20

## 2018-04-20 RX ORDER — PREGABALIN 75 MG/1
150 CAPSULE ORAL
Status: DISCONTINUED | OUTPATIENT
Start: 2018-04-23 | End: 2018-04-21 | Stop reason: HOSPADM

## 2018-04-20 RX ORDER — SODIUM CHLORIDE, SODIUM LACTATE, POTASSIUM CHLORIDE, CALCIUM CHLORIDE 600; 310; 30; 20 MG/100ML; MG/100ML; MG/100ML; MG/100ML
INJECTION, SOLUTION INTRAVENOUS CONTINUOUS
Status: CANCELLED | OUTPATIENT
Start: 2018-04-20

## 2018-04-20 NOTE — TELEPHONE ENCOUNTER
Spoke with pt , 6:00am arrival time for surgery on Monday , with  at [facility]. Pt verbalized understanding.

## 2018-04-20 NOTE — ANESTHESIA PREPROCEDURE EVALUATION
04/20/2018  Sharita Gonzalez is a 72 y.o., male.    Anesthesia Evaluation    I have reviewed the Patient Summary Reports.    I have reviewed the Nursing Notes.   I have reviewed the Medications.     Review of Systems  Anesthesia Hx:  No problems with previous Anesthesia  History of prior surgery of interest to airway management or planning: Previous anesthesia: General 2/15 trigger finger with local anesthesia and propofol infusion, did well with general anesthesia.  Denies Family Hx of Anesthesia complications.   Denies Personal Hx of Anesthesia complications.   Social:  No Alcohol Use, Non-Smoker    Hematology/Oncology:  Hematology Normal   Oncology Normal     EENT/Dental:EENT/Dental Normal   Cardiovascular:   Exercise tolerance: good Hypertension, well controlled    Pulmonary:  Pulmonary Normal    Renal/:  Renal/ Normal     Hepatic/GI:  Hepatic/GI Normal    Musculoskeletal:  Musculoskeletal Normal    Neurological:  Neurology Normal    Endocrine:  Endocrine Normal    Dermatological:  Skin Normal    Psych:  Psychiatric Normal           Physical Exam  General:  Well nourished      Dental:  Dental Findings: molar caps             Anesthesia Plan  Type of Anesthesia, risks & benefits discussed:  Anesthesia Type:  MAC, regional  Patient's Preference:   Intra-op Monitoring Plan:   Intra-op Monitoring Plan Comments:   Post Op Pain Control Plan: multimodal analgesia  Post Op Pain Control Plan Comments:   Induction:   IV  Beta Blocker:         Informed Consent: Patient understands risks and agrees with Anesthesia plan.  Questions answered. Anesthesia consent signed with patient.  ASA Score: 2     Day of Surgery Review of History & Physical:    H&P update referred to the surgeon.     Anesthesia Plan Notes: Had MAC before wo problem,plan MAC        Ready For Surgery From Anesthesia Perspective.

## 2018-04-20 NOTE — DISCHARGE INSTRUCTIONS
PRE-ADMIT TESTING -  [phone]    [address]  [place] [facility]          Your surgery has been scheduled at [facility]. We are pleased to have the opportunity to serve you. For Further Information please call [phone].    On the day of surgery please report to the Information Desk on the 1st floor.    · CONTACT YOUR PHYSICIAN'S OFFICE THE DAY PRIOR TO YOUR SURGERY TO OBTAIN YOUR ARRIVAL TIME.     · The evening before surgery do not eat anything after 9 p.m. ( this includes hard candy, chewing gum and mints).  You may only have GATORADE, POWERADE AND WATER  from 9 p.m. until you leave your home.   DO NOT DRINK ANY LIQUIDS ON THE WAY TO THE HOSPITAL.      SPECIAL MEDICATION INSTRUCTIONS: TAKE medications checked off by the Anesthesiologist on your Medication List.    Angiogram Patients: Take medications as instructed by your physician, including aspirin.     Surgery Patients:    If you take ASPIRIN - Your PHYSICIAN/SURGEON will need to inform you IF/OR when you need to stop taking aspirin prior to your surgery.     Do Not take any medications containing IBUPROFEN.  Do Not Wear any make-up or dark nail polish   (especially eye make-up) to surgery. If you come to surgery with makeup on you will be required to remove the makeup or nail polish.    Do not shave your surgical area at least 5 days prior to your surgery. The surgical prep will be performed at the hospital according to Infection Control regulations.    Leave all valuables at home.   Do Not wear any jewelry or watches, including any metal in body piercings.  Contact Lens must be removed before surgery. Either do not wear the contact lens or bring a case and solution for storage.  Please bring a container for eyeglasses or dentures as required.  Bring any paperwork your physician has provided, such as consent forms,  history and physicals, doctor's orders, etc.   Bring comfortable clothes that are loose fitting to wear upon  discharge. Take into consideration the type of surgery being performed.  Maintain your diet as advised per your physician the day prior to surgery.      Adequate rest the night before surgery is advised.   Park in the Parking lot behind the hospital or in the [facility] across the street from the parking lot. Parking is complimentary.  If you will be discharged the same day as your procedure, please arrange for a responsible adult to drive you home or to accompany you if traveling by taxi.   YOU WILL NOT BE PERMITTED TO DRIVE OR TO LEAVE THE HOSPITAL ALONE AFTER SURGERY.   It is strongly recommended that you arrange for someone to remain with you for the first 24 hrs following your surgery.       Thank you for your cooperation.  The Staff of [facility].        Bathing Instructions                                                                 Please shower the evening before and morning of your procedure with    ANTIBACTERIAL SOAP. ( DIAL, etc )  Concentrate on the surgical area   for at least 3 minutes and rinse completely. Dry off as usual.   Do not use any deodorant, powder, body lotions, perfume, after shave or    cologne.

## 2018-04-22 NOTE — H&P
CC:  Left hand pain    Mr. Gonzalez presented for a post-operative visit.  He is status post right shoulder arthroscopy, rotator cuff repair, and debridement of the rotator cuff by Dr. Simpson on 1/3/18. He reports that he is doing well. Does not complain of pain, does have occasional soreness, not taking pain medication. He reports improvement in ROM with therapy though still limited compared to left.  He denies fever, chills, and sweats since the time of the surgery.      Also presents for EMG results. He notes intermittent numbness and tingling of bl hands in the median nerve distribution. We had discussed this at last visit but he did not wish to investigate at that time. However, since I last saw him he began having increased pain, has difficulty sleeping due to symptoms. He did decide to obtain EMG which shows bl severe carpal tunnel syndrome. His PCP gave him Gabapentin which does help him sleep some.      Also, since last visit he notes beginning of a left index trigger finger. It is not locking but is clicking/popping and is tender over the area of the A1 pulley. He has had multiple trigger finger releases in the past by Dr. Simpson.      Past Medical History:   Diagnosis Date    Carpal tunnel syndrome     Hypertension     Polymyalgia     Prostate cancer      Past Surgical History:   Procedure Laterality Date    BRAIN SURGERY  age 27    subarrachnoid brain hemorhage     CATARACT EXTRACTION      FINGER SURGERY  9-17-13    left TFR    HEMORRHOID SURGERY      radiation seeds   2008    prostate cancer    SHOULDER SURGERY Right     rotator     No current facility-administered medications on file prior to encounter.      Current Outpatient Prescriptions on File Prior to Encounter   Medication Sig Dispense Refill    gabapentin (NEURONTIN) 100 MG capsule Take 300 mg by mouth every evening.       trandolapril (MAVIK) 4 MG Tab 4 mg every morning.       verapamil (CALAN-SR) 240 MG CR tablet Take 240  mg by mouth every morning.        Review of patient's allergies indicates:   Allergen Reactions    Statins-hmg-coa reductase inhibitors      Back ache with Lipitor     Adhesive Rash     Pulls skin off    May use paper tape    Latex, natural rubber Rash    Percocet [oxycodone-acetaminophen] Rash     Family History   Problem Relation Age of Onset    Anesthesia problems Neg Hx     Broken bones Neg Hx     Cancer Neg Hx     Clotting disorder Neg Hx     Collagen disease Neg Hx     Diabetes Neg Hx     Dislocations Neg Hx     Osteoporosis Neg Hx     Rheumatologic disease Neg Hx     Scoliosis Neg Hx     Severe sprains Neg Hx      Social History     Social History    Marital status:      Spouse name: N/A    Number of children: N/A    Years of education: N/A     Occupational History    Not on file.     Social History Main Topics    Smoking status: Light Tobacco Smoker     Types: Cigars    Smokeless tobacco: Never Used      Comment: smokes a cigar once a month    Alcohol use No      Comment: occasional beer     Drug use: No    Sexual activity: Not on file     Other Topics Concern    Not on file     Social History Narrative    No narrative on file     Review of patient's allergies indicates:   Allergen Reactions    Statins-hmg-coa reductase inhibitors      Back ache with Lipitor     Adhesive Rash     Pulls skin off    May use paper tape    Latex, natural rubber Rash    Percocet [oxycodone-acetaminophen] Rash     No current facility-administered medications on file prior to encounter.      Current Outpatient Prescriptions on File Prior to Encounter   Medication Sig Dispense Refill    gabapentin (NEURONTIN) 100 MG capsule Take 300 mg by mouth every evening.       trandolapril (MAVIK) 4 MG Tab 4 mg every morning.       verapamil (CALAN-SR) 240 MG CR tablet Take 240 mg by mouth every morning.        Review of Systems   Constitution: Negative. Negative for chills, fever and night sweats.  "  HENT: neg congestion.    Eyes: Negative for blurred vision.  Cardiovascular: Negative for chest pain and syncope.   Respiratory: Negative for cough and shortness of breath.    Hematologic/Lymphatic: Does not bruise/bleed easily.   Skin: Negative for dry skin, itching and rash.   Musculoskeletal:  fall.  No weakness  Gastrointestinal: Negative for abdominal pain.  Normal bowel function.  Genitourinary: Normal bladder function   Neurological: No dizziness, loss of balance, seizures.   Psychiatric/Behavioral: Negative for depression.       Physical exam:        Vitals:     03/28/18 1322   BP: 135/83   Pulse: 72   Weight: 82.1 kg (181 lb)   Height: 5' 5" (1.651 m)   PainSc: 0-No pain   PainLoc: Shoulder      Gen:  No acute distress  Head:  Normocephalic.  Atraumatic.  Neuro:  Intact  CV:  Peripherally well-perfused.  Pulses 2+ bilaterally.  Lungs:  Normal respiratory effort.  Abdomen:  Soft, non-tender, non-distended  Extremities:  No cyanosis, clubbing, or edema.      RUE:  Incisions are well healed. There is no sign of any infection. He is NVI. Fair shoulder AROM, better passively. Good ROM of the elbow, wrist, and fingers. He has positive tinels at the wrist, positive durkans.      LUE:  Good ROM of the wrist and fingers. There is clicking of the index. He has ttp over the A1 pulley, small nodule palpated. NVI. Positive tinels at the wrist, positive durkans.      Assessment: status post right shoulder arthroscopy, rotator cuff repair, and debridement of the rotator cuff     Plan:  Sharita was seen today for pain.     Diagnoses and all orders for this visit:     Tear of right rotator cuff, unspecified tear extent     Post-operative state     Bilateral carpal tunnel syndrome           -continue PT- ROM, can begin strengthening   -discussed treatment options for carpal tunnel. Recommend surgical release due to severity. He wishes to proceed with left carpal tunnel release, at the same time he wishes to also have left " index trigger finger release as well as a right carpal tunnel injection

## 2018-04-23 ENCOUNTER — HOSPITAL ENCOUNTER (OUTPATIENT)
Facility: OTHER | Age: 73
Discharge: HOME | End: 2018-04-23
Attending: ORTHOPAEDIC SURGERY | Admitting: ORTHOPAEDIC SURGERY
Payer: MEDICARE

## 2018-04-23 ENCOUNTER — ANESTHESIA (OUTPATIENT)
Dept: SURGERY | Facility: OTHER | Age: 73
End: 2018-04-23
Payer: MEDICARE

## 2018-04-23 VITALS
TEMPERATURE: 98 F | OXYGEN SATURATION: 97 % | WEIGHT: 181 LBS | DIASTOLIC BLOOD PRESSURE: 72 MMHG | SYSTOLIC BLOOD PRESSURE: 140 MMHG | BODY MASS INDEX: 30.16 KG/M2 | HEART RATE: 61 BPM | RESPIRATION RATE: 18 BRPM | HEIGHT: 65 IN

## 2018-04-23 DIAGNOSIS — G56.02 LEFT CARPAL TUNNEL SYNDROME: ICD-10-CM

## 2018-04-23 PROCEDURE — 37000008 HC ANESTHESIA 1ST 15 MINUTES: Performed by: ORTHOPAEDIC SURGERY

## 2018-04-23 PROCEDURE — 36000707: Performed by: ORTHOPAEDIC SURGERY

## 2018-04-23 PROCEDURE — 36000706: Performed by: ORTHOPAEDIC SURGERY

## 2018-04-23 PROCEDURE — 37000009 HC ANESTHESIA EA ADD 15 MINS: Performed by: ORTHOPAEDIC SURGERY

## 2018-04-23 PROCEDURE — 25000003 PHARM REV CODE 250: Performed by: NURSE ANESTHETIST, CERTIFIED REGISTERED

## 2018-04-23 PROCEDURE — 64721 CARPAL TUNNEL SURGERY: CPT | Mod: LT,,, | Performed by: ORTHOPAEDIC SURGERY

## 2018-04-23 PROCEDURE — 71000015 HC POSTOP RECOV 1ST HR: Performed by: ORTHOPAEDIC SURGERY

## 2018-04-23 PROCEDURE — 20526 THER INJECTION CARP TUNNEL: CPT | Mod: 59,RT,, | Performed by: ORTHOPAEDIC SURGERY

## 2018-04-23 PROCEDURE — 71000016 HC POSTOP RECOV ADDL HR: Performed by: ORTHOPAEDIC SURGERY

## 2018-04-23 PROCEDURE — 63600175 PHARM REV CODE 636 W HCPCS: Performed by: NURSE ANESTHETIST, CERTIFIED REGISTERED

## 2018-04-23 PROCEDURE — 26055 INCISE FINGER TENDON SHEATH: CPT | Mod: 51,F1,, | Performed by: ORTHOPAEDIC SURGERY

## 2018-04-23 PROCEDURE — 25000003 PHARM REV CODE 250: Performed by: ORTHOPAEDIC SURGERY

## 2018-04-23 PROCEDURE — 63600175 PHARM REV CODE 636 W HCPCS: Performed by: ORTHOPAEDIC SURGERY

## 2018-04-23 RX ORDER — MIDAZOLAM HYDROCHLORIDE 5 MG/ML
2 INJECTION INTRAMUSCULAR; INTRAVENOUS
Status: DISCONTINUED | OUTPATIENT
Start: 2018-04-23 | End: 2018-04-23 | Stop reason: HOSPADM

## 2018-04-23 RX ORDER — SODIUM CHLORIDE 0.9 % (FLUSH) 0.9 %
5 SYRINGE (ML) INJECTION
Status: DISCONTINUED | OUTPATIENT
Start: 2018-04-23 | End: 2018-04-23 | Stop reason: HOSPADM

## 2018-04-23 RX ORDER — SODIUM CHLORIDE, SODIUM LACTATE, POTASSIUM CHLORIDE, CALCIUM CHLORIDE 600; 310; 30; 20 MG/100ML; MG/100ML; MG/100ML; MG/100ML
INJECTION, SOLUTION INTRAVENOUS CONTINUOUS PRN
Status: DISCONTINUED | OUTPATIENT
Start: 2018-04-23 | End: 2018-04-23

## 2018-04-23 RX ORDER — LIDOCAINE HCL/PF 100 MG/5ML
SYRINGE (ML) INTRAVENOUS
Status: DISCONTINUED | OUTPATIENT
Start: 2018-04-23 | End: 2018-04-23

## 2018-04-23 RX ORDER — MUPIROCIN 20 MG/G
1 OINTMENT TOPICAL 2 TIMES DAILY
Status: DISCONTINUED | OUTPATIENT
Start: 2018-04-23 | End: 2018-04-23 | Stop reason: HOSPADM

## 2018-04-23 RX ORDER — FENTANYL CITRATE 50 UG/ML
INJECTION, SOLUTION INTRAMUSCULAR; INTRAVENOUS
Status: DISCONTINUED | OUTPATIENT
Start: 2018-04-23 | End: 2018-04-23

## 2018-04-23 RX ORDER — PROPOFOL 10 MG/ML
VIAL (ML) INTRAVENOUS
Status: DISCONTINUED | OUTPATIENT
Start: 2018-04-23 | End: 2018-04-23

## 2018-04-23 RX ORDER — LIDOCAINE HYDROCHLORIDE 10 MG/ML
INJECTION, SOLUTION EPIDURAL; INFILTRATION; INTRACAUDAL; PERINEURAL
Status: DISCONTINUED | OUTPATIENT
Start: 2018-04-23 | End: 2018-04-23 | Stop reason: HOSPADM

## 2018-04-23 RX ORDER — SODIUM CHLORIDE, SODIUM LACTATE, POTASSIUM CHLORIDE, CALCIUM CHLORIDE 600; 310; 30; 20 MG/100ML; MG/100ML; MG/100ML; MG/100ML
INJECTION, SOLUTION INTRAVENOUS CONTINUOUS
Status: DISCONTINUED | OUTPATIENT
Start: 2018-04-23 | End: 2018-04-23 | Stop reason: HOSPADM

## 2018-04-23 RX ORDER — BUPIVACAINE HYDROCHLORIDE 2.5 MG/ML
INJECTION, SOLUTION EPIDURAL; INFILTRATION; INTRACAUDAL
Status: DISCONTINUED | OUTPATIENT
Start: 2018-04-23 | End: 2018-04-23 | Stop reason: HOSPADM

## 2018-04-23 RX ORDER — CEFAZOLIN SODIUM 1 G/3ML
2 INJECTION, POWDER, FOR SOLUTION INTRAMUSCULAR; INTRAVENOUS
Status: COMPLETED | OUTPATIENT
Start: 2018-04-23 | End: 2018-04-23

## 2018-04-23 RX ORDER — LIDOCAINE HYDROCHLORIDE 10 MG/ML
0.5 INJECTION, SOLUTION EPIDURAL; INFILTRATION; INTRACAUDAL; PERINEURAL ONCE
Status: DISCONTINUED | OUTPATIENT
Start: 2018-04-23 | End: 2018-04-23 | Stop reason: HOSPADM

## 2018-04-23 RX ADMIN — PROPOFOL 20 MG: 10 INJECTION, EMULSION INTRAVENOUS at 08:04

## 2018-04-23 RX ADMIN — PROPOFOL 100 MG: 10 INJECTION, EMULSION INTRAVENOUS at 07:04

## 2018-04-23 RX ADMIN — SODIUM CHLORIDE, SODIUM LACTATE, POTASSIUM CHLORIDE, AND CALCIUM CHLORIDE: 600; 310; 30; 20 INJECTION, SOLUTION INTRAVENOUS at 07:04

## 2018-04-23 RX ADMIN — CEFAZOLIN 2 G: 330 INJECTION, POWDER, FOR SOLUTION INTRAMUSCULAR; INTRAVENOUS at 07:04

## 2018-04-23 RX ADMIN — SODIUM CHLORIDE, SODIUM LACTATE, POTASSIUM CHLORIDE, AND CALCIUM CHLORIDE: 600; 310; 30; 20 INJECTION, SOLUTION INTRAVENOUS at 08:04

## 2018-04-23 RX ADMIN — LIDOCAINE HYDROCHLORIDE 50 MG: 20 INJECTION, SOLUTION INTRAVENOUS at 07:04

## 2018-04-23 RX ADMIN — PROPOFOL 20 MG: 10 INJECTION, EMULSION INTRAVENOUS at 07:04

## 2018-04-23 RX ADMIN — FENTANYL CITRATE 100 MCG: 50 INJECTION, SOLUTION INTRAMUSCULAR; INTRAVENOUS at 07:04

## 2018-04-23 NOTE — PLAN OF CARE
Sharita Gonzalez has met all discharge criteria from Phase II. Vital Signs are stable, ambulating  without difficulty. Discharge instructions given, patient verbalized understanding. Discharged from facility via wheelchair in stable condition.

## 2018-04-23 NOTE — ANESTHESIA POSTPROCEDURE EVALUATION
"Anesthesia Post Evaluation    Patient: Sharita Gonzalez    Procedure(s) Performed: Procedure(s) (LRB):  RELEASE-CARPAL TUNNEL - LEFT (Left)  RELEASE-FINGER-TRIGGER - Left Index Finger (Left)  INJECTION-RIGHT CARPAL TUNNEL INJECTION (Right)    Final Anesthesia Type: general  Patient location during evaluation: [facility]  Patient participation: Yes- Able to Participate  Level of consciousness: awake and alert, awake and oriented  Post-procedure vital signs: reviewed and stable  Pain management: adequate  Airway patency: patent  PONV status at discharge: No PONV  Anesthetic complications: no      Cardiovascular status: blood pressure returned to baseline  Respiratory status: unassisted, spontaneous ventilation and room air  Hydration status: euvolemic  Follow-up not needed.        Visit Vitals  BP (!) 146/76 (BP Location: Left arm, Patient Position: Lying)   Pulse 68   Temp 36.8 °C (98.2 °F) (Oral)   Resp 18   Ht 5' 5" (1.651 m)   Wt 82.1 kg (181 lb)   SpO2 95%   BMI 30.12 kg/m²       Pain/Felicita Score: Presence of Pain: denies (4/23/2018  6:43 AM)      "

## 2018-04-23 NOTE — BRIEF OP NOTE
[facility]  Brief Operative Note     SUMMARY     Surgery Date: 4/23/2018     Surgeon(s) and Role:     * Christine Simpson MD - Primary    Assisting Surgeon: None    Pre-op Diagnosis:  Carpal tunnel syndrome, right [G56.01]  Carpal tunnel syndrome, left [G56.02]  Trigger finger, left index finger [M65.322]    Post-op Diagnosis:  Post-Op Diagnosis Codes:     * Carpal tunnel syndrome, right [G56.01]     * Carpal tunnel syndrome, left [G56.02]     * Trigger finger, left index finger [M65.322]    Procedure(s) (LRB):  RELEASE-CARPAL TUNNEL - LEFT (Left)  RELEASE-FINGER-TRIGGER - Left Index Finger (Left)  INJECTION-RIGHT CARPAL TUNNEL INJECTION (Right)    Anesthesia: Local MAC    Description of the findings of the procedure: see op note    Findings/Key Components: see op note    Estimated Blood Loss: * No values recorded between 4/23/2018  8:03 AM and 4/23/2018  8:22 AM *         Specimens:   Specimen (12h ago through future)    None          Discharge Note    SUMMARY     Admit Date: 4/23/2018    Discharge Date and Time:  04/23/2018 8:24 AM    Hospital Course (synopsis of major diagnoses, care, treatment, and services provided during the course of the hospital stay): Patient was admitted for outpatient surgery.  There were no complications.  Patient was discharged home in good condition.        Final Diagnosis: Post-Op Diagnosis Codes:     * Carpal tunnel syndrome, right [G56.01]     * Carpal tunnel syndrome, left [G56.02]     * Trigger finger, left index finger [M65.322]    Disposition: Home or Self Care    Follow Up/Patient Instructions:     Medications:  Reconciled Home Medications:      Medication List      CONTINUE taking these medications    acetaminophen-codeine 300-30mg 300-30 mg Tab  Commonly known as:  TYLENOL #3  Take 1 tablet by mouth every 4 (four) hours as needed (Post operative pain).     aspirin 81 MG EC tablet  Commonly known as:  ECOTRIN  Take 81 mg by mouth once daily.      gabapentin 100 MG capsule  Commonly known as:  NEURONTIN  Take 300 mg by mouth every evening.     multivitamin capsule  Take 1 capsule by mouth once daily.     trandolapril 4 MG Tab  Commonly known as:  MAVIK  4 mg every morning.     verapamil 240 MG CR tablet  Commonly known as:  CALAN-SR  Take 240 mg by mouth every morning.            Discharge Procedure Orders  Diet Adult Regular     Weight bearing restrictions (specify)   Order Comments: No weight bearing - operative extremity     Call MD for:  temperature >100.4     Call MD for:  persistent nausea and vomiting or diarrhea     Call MD for:  severe uncontrolled pain     Call MD for:  difficulty breathing or increased cough     Call MD for:  worsening rash     Call MD for:  persistent dizziness, light-headedness, or visual disturbances     Call MD for:  increased confusion or weakness     Leave dressing on - Keep it clean, dry, and intact until clinic visit       Follow-up Information     CARMELITA Hendricks On 5/9/2018.    Specialties:  Hand Surgery, Orthopedic Surgery  Contact information:  [address] [address]  [phone]

## 2018-04-23 NOTE — DISCHARGE INSTRUCTIONS
Anesthesia: Monitored Anesthesia Care (MAC)    Youre due to have surgery. During surgery, youll be given medicine called anesthesia. This will keep you comfortable and pain-free. Your surgeon will use monitored anesthesia care (MAC). This sheet tells you more about this type of anesthesia.  What is monitored anesthesia care?  MAC keeps you very drowsy during surgery. You may be awake, but you will likely not remember much. And you wont feel pain. With MAC, medicines are given through an IV line into a vein in your arm or hand. A local anesthetic will usually be injected into the skin and muscle around the surgical site to numb it. The anesthesia provider monitors you during the procedure. He or she checks your heart rate and rhythm, blood pressure, and blood oxygen level.  Anesthesia tools and medicines that may be near you during your procedure  You will likely have:  · A pulse oximeter on the end of your finger. This measures your blood oxygen level.  · Electrocardiography leads (electrodes) on your chest. These record your heart rate and rhythm.  · Medicines given through an IV. These relax you and prevent pain. You may be awake or sleep lightly. If you have local anesthetic, it is injected directly into your skin.  · A facemask to give you oxygen, if needed.  Risks and possible complications  MAC has some risks. These include:  · Breathing problems  · Nausea and vomiting  · Allergic reaction to the anesthetic    Anesthesia safety  Tips for anesthesia safety include the following:   · Follow all instructions you are given for how long not to eat or drink before your procedure.  · Be sure your healthcare provider knows what medicines you take, especially any anti-inflammatory medicine or blood thinners. This includes aspirin and any other over-the-counter medicines, herbs, and supplements.  · Have an adult family member or friend drive you home after the procedure.  · For the first 24 hours after your  surgery:  ¨ Do not drive or use heavy equipment.  ¨ Do not make important decisions or sign documents.  ¨ Avoid alcohol.  ¨ Have someone stay with you, if possible. They can watch for problems and help keep you safe.      Follow carpal tunnel release discharge instructions given per .  Date Last Reviewed: 12/1/2016  © 0817-8297 [employer]. [address] [place] [address], [place] [id number]. All rights reserved. This information is not intended as a substitute for professional medical care. Always follow your healthcare professional's instructions.

## 2018-04-23 NOTE — BRIEF OP NOTE
Preoperative Diagnosis: left CTS, Left index trigger finger, right CTS  Postoperative Diagnosis:same  Procedure: Left CTR, Left index trigger finger release, Left CT injection  Blood loss:none  Implants:none  Specimen:none  Complications:none  Surgeon:He MORALEZ  Assistant Surgeon:fara

## 2018-04-25 ENCOUNTER — CLINICAL SUPPORT (OUTPATIENT)
Dept: REHABILITATION | Facility: HOSPITAL | Age: 73
End: 2018-04-25
Payer: MEDICARE

## 2018-04-25 DIAGNOSIS — Z98.890 S/P RIGHT ROTATOR CUFF REPAIR: ICD-10-CM

## 2018-04-25 DIAGNOSIS — M79.601 RIGHT ARM PAIN: Primary | ICD-10-CM

## 2018-04-25 DIAGNOSIS — M25.60 RANGE OF MOTION DEFICIT: ICD-10-CM

## 2018-04-25 PROCEDURE — 97110 THERAPEUTIC EXERCISES: CPT | Mod: PN

## 2018-04-25 NOTE — OP NOTE
DATE OF PROCEDURE: 4/23/18  SERVICE: Orthopedics.   ATTENDING SURGEON: Christine Simpson M.D.   ASSISTANT SURGEON: none  PREOPERATIVE DIAGNOSIS: bilateral carpal tunnel syndrome. Left index finger trigger finger  POSTOPERATIVE DIAGNOSIS: bilateral carpal tunnel syndrome. Left index trigger finger  PROCEDURE: left carpal tunnel release. Left index finger A1 pulley release Right carpal tunnel injection with steroid  ANESTHESIA: Local placed by surgeon and MAC.   FLUIDS: Lactated Ringers.   BLOOD LOSS: None. No blood was given.   TOURNIQUET TIME: 15 minutes.   PACKS AND DRAINS: None.   IMPLANTS: None.   SPECIMENS: None.   COMPLICATIONS: None.   INDICATIONS FOR PROCEDURE: Mr. Gonzalez is a 72-year-old male who had numbness   and tingling into her bilateral hand. He has failed conservative treatment, EMG   was positive for carpal tunnel syndrome. Therefore, operative intervention was   deemed necessary.He also has triggering of left index finger. Risks and benefits were explained to the patient in clinic   since performed in clinic.   PROCEDURE IN DETAIL: After correct site was marked with the patient's   participation in the holding area, the patient was brought to the Operating   Room, placed in supine position, underwent MAC anesthesia. A well-padded   nonsterile tourniquet was placed on the left arm. A time-out was called for   correct site, procedure and patient to be indicated. Under sterile conditions,   an injection of lidocaine 1% was injected into the carpal tunnel space and A1 pulley  Of the left index finger.. The arm   was prepped and draped in normal sterile fashion. The incision was marked out   using Gonsalez cardinal lines. The arm was exsanguinated using Esmarch.   Tourniquet was insufflated to 250 mmHg and that is where it remained for a total   of 15 minutes. The incision was made down to the palmar fascia. The palmar   fascia was sharply incised. The transverse ligament was identified. It was   very  thick in nature. It was sharply incised. Median nerve was identified. A   Mosquito hemostat was passed from the undersurface of the transverse ligament   proximally and distally to free it from the median nerve. Metzenbaum scissors   were utilized to cut the transverse ligament proximally and distally. Once that   was completely released, a Mosquito hemostat was passed again to confirm a   complete release. Once that was performed, the area was irrigated with copious   amounts of normal saline. Nylon closed the skin.  Incision was   marked out in a longitudinal fashion along theA1 pulley of the index finger. TThe incision was made. Careful dissection   down was maintained to the A1 pulley. The neurovascular structures were   retracted out of the way. The A1 pulley was identified. It was sharply   incised. It was completely incised proximally and distally. Portion of    pulley was also incised. The tendon was then retracted out of the tendon sheath   using a right angle. The finger was placed through range of motion, showed it   did not trigger. The area was irrigated with copious amounts of normal saline.   Nylon closed the skin. Sterile dressing was applied. Tourniquet was deflated.   Brisk cap refill ensued. The patient was transferred the Recovery Room in   stable condition.   POSTOPERATIVE PLAN FOR THIS PATIENT: She is to keep her dressing clean, dry and   intact. We will see her back in 2 weeks for stitch removal.

## 2018-04-25 NOTE — PROGRESS NOTES
Patient:  Sharita Gonzalez  [facility] #:  [id number]   Date of Note: 4/25/2018  Referring Physician: La Gallegos PA-C Dr. Sisco-Wise  Diagnosis:    Encounter Diagnoses   Name Primary?    Right arm pain Yes    S/P right rotator cuff repair     Range of motion deficit         Start Time: 8:50 am  End Time: 9:40 am  Total Time: 50 min  42 min direct care  Group Time: -    20 of 20 visits expires 12/31/18   second auth 2 of 12 expires 12/31/18  Surgery 1/3/18  16 weeks  5 days post op    Subjective:   Pt reports he feels his polymyositis may be flaring up due to pain in his side.  Pain: 2 out of 10 pre therapy, improvement in discomfort after therapy.    Objective:   Patient seen by OT this session. Treatment  consist of the following:  Therapeutic exercises    Treatment: Therapeutic exercises x 42 min  Sci fit with BUE instructed for forward direction only with right hand . Pt rec'd passive stretch of right shoulder crepitus noted with mid to end range flexion.     Exericses as follows:  Supine ER with yellow theraband x 2 sets 10 reps  Supine flexion with 1# x 2 sets of 10 reps - pt with difficulty initially raise arm to 90, increase crepitus and weakness  Supine 2# tricep curls x 2 sets of 10 reps  Supine yellow band h. Abduction x 2 sets of 10 reps  1# supine arm in 90 flex 3 sets of 10 reps in CCW and CW rotations  Matrix rows15# x 2 sets of 10 reps  Matrix tricep curls 35# x 2 sets 10 reps    Pt rec'd ice x 8 min after ex.   Pt instructed to perform his supine flexion with 1#, theraband scapular retraction and ER with yellow band using only right hand.  Pt expressed understanding.     Plan of care rec'd from Dr. Simpson for care from  4/25/18 to 6/21/18  .    Assessment:  Pt with weakness this day and an increase in crepitus in shoulder.  Pt will continue to benefit from skilled OT intervention. Medical necessity is demonstrated by: Pt continues to be limited in functional and leisurely pursuits. Pain limits  patients participation in ADL's. Pt is not able to carryout necessary vocational tasks. Patient continues to requires cues and skilled supervision to complete HEP      Plan:  Continue with plan of care 1-2 x week x 8 weeks  during the certification period from   4/25/18 to 6/21/18    in pursuit of  OT goals.  Pt to f/u with Dr. Simpson 2/16/18.

## 2018-04-30 ENCOUNTER — CLINICAL SUPPORT (OUTPATIENT)
Dept: REHABILITATION | Facility: HOSPITAL | Age: 73
End: 2018-04-30
Payer: MEDICARE

## 2018-04-30 DIAGNOSIS — Z98.890 S/P RIGHT ROTATOR CUFF REPAIR: ICD-10-CM

## 2018-04-30 DIAGNOSIS — M79.601 RIGHT ARM PAIN: Primary | ICD-10-CM

## 2018-04-30 DIAGNOSIS — M25.60 RANGE OF MOTION DEFICIT: ICD-10-CM

## 2018-04-30 PROCEDURE — 97110 THERAPEUTIC EXERCISES: CPT | Mod: PN

## 2018-04-30 NOTE — PROGRESS NOTES
Patient:  Sharita Gonzalez  [facility] #:  [id number]   Date of Note: 4/30/2018  Referring Physician: La Gallegos PA-C Dr. Sisco-Wise  Diagnosis:    Encounter Diagnoses   Name Primary?    Right arm pain Yes    S/P right rotator cuff repair     Range of motion deficit         Start Time: 11:30 am  End Time: 12:10 am  Total Time: 40 min  32 min direct care  Group Time: -    20 of 20 visits expires 12/31/18   second auth 3 of 12 expires 12/31/18  Surgery 1/3/18  17 weeks  5 days post op    Subjective:   Pt reports his shoulder is not as sore as last week but he did not workout at home yesterday.  Pain: 1-2 out of 10 pre therapy, improvement in discomfort after therapy.    Objective:   Patient seen by OT this session. Treatment  consist of the following:  Therapeutic exercises    Treatment: Therapeutic exercises x 32 min  Sci fit with BUE instructed for forward direction only. Pt rec'd passive stretch of right shoulder with significant crepitus noted.  Isometrics for IR and ER x 10 reps each holding x 5 seconds.     Exericses as follows:  Supine ER with yellow theraband x 2 sets 10 reps  Supine flexion with 1.5# x 20 reps -   Supine 3# tricep curls x 20 reps  Supine 1.5# h.adduction x 10 reps  3.5 # prone rows x 2 sets of 10 reps    Pt rec'd ice x 8 min after ex.   Pt instructed to perform his strengthening exercises every other day to attempt to decrease his soreness. Pt is able to perform stretching exercises daily.  Pt expressed understanding.     Plan of care rec'd from Dr. Simpson for care from  4/25/18 to 6/21/18  .    Assessment:  Pt tolerated greater resistance in therapy when he had a previous days rest from exercise.  Pt expressed understanding of HEP change.   Pt will continue to benefit from skilled OT intervention. Medical necessity is demonstrated by: Pt continues to be limited in functional and leisurely pursuits. Pain limits patients participation in ADL's. Pt is not able to carryout necessary  vocational tasks. Patient continues to requires cues and skilled supervision to complete HEP      Plan:  Continue with plan of care 1-2 x week x 8 weeks  during the certification period from   4/25/18 to 6/21/18    in pursuit of  OT goals.  Pt to f/u with Dr. Simpson 2/16/18.

## 2018-05-02 ENCOUNTER — CLINICAL SUPPORT (OUTPATIENT)
Dept: REHABILITATION | Facility: HOSPITAL | Age: 73
End: 2018-05-02
Payer: MEDICARE

## 2018-05-02 DIAGNOSIS — M79.601 RIGHT ARM PAIN: Primary | ICD-10-CM

## 2018-05-02 DIAGNOSIS — Z98.890 S/P RIGHT ROTATOR CUFF REPAIR: ICD-10-CM

## 2018-05-02 DIAGNOSIS — M25.60 RANGE OF MOTION DEFICIT: ICD-10-CM

## 2018-05-02 PROCEDURE — 97110 THERAPEUTIC EXERCISES: CPT | Mod: PN

## 2018-05-02 NOTE — PROGRESS NOTES
Patient:  Sharita Gonzalez  [facility] #:  [id number]   Date of Note: 5/2/2018  Referring Physician: La Gallegos PA-C Dr. Sisco-Wise  Diagnosis:    Encounter Diagnoses   Name Primary?    Right arm pain Yes    S/P right rotator cuff repair     Range of motion deficit         Start Time: 11:05 am  End Time: 11:55 am  Total Time:  50 min  42 min direct care  Group Time: -    20 of 20 visits expires 12/31/18   second auth 4 of 12 expires 12/31/18  Surgery 1/3/18    Subjective:   Pt reports he was able to move his arm well at home after his last therapy session.  Pain: 0 out of 10 pain pre therapy, some soreness 1-2/10 improvement in discomfort after therapy.    Objective:   Patient seen by OT this session. Treatment  consist of the following:  Therapeutic exercises    Treatment: Therapeutic exercises x 42 min  Pt engaged in supine 1# dowel flexion x 30 reps.  Passive stretch of right shoulder with soft end ranges noted today and less crepitus.    Exericses as follows:  3.5 # prone rows x 2 sets of 10 reps  Supine 1.5# h.adduction x 10 reps  Supine 3# tricep curls x 20 reps  Supine ER with yellow theraband x 2 sets 10 reps  Supine flexion with 1.5# x 20 reps   Matrix rows 20# x 2 sets of 10 reps  Matrix tricep curls 40# x 2 sets 10 reps  [place] ball on table top 3 sets of 10 rotations CCW/CW  Pt rec'd ice x 8 min after ex.     Pt instructed to continue with his strengthening exercises every other day, pt to increase his supine flexion to 1.5# resistance.   Pt expressed understanding.     Plan of care rec'd from Dr. Simpson for care from  4/25/18 to 6/21/18  .    Assessment:  Pt with overall improvement in his shoulder condition and slight improvement in functional use of hand in a reclined position.(scratch his head)  Pt will continue to benefit from skilled OT intervention. Medical necessity is demonstrated by: Pt continues to be limited in functional and leisurely pursuits. Pain limits patients participation in  ADL's. Pt is not able to carryout necessary vocational tasks. Patient continues to requires cues and skilled supervision to complete HEP      Plan:  Continue with plan of care 1-2 x week x 8 weeks  during the certification period from   4/25/18 to 6/21/18    in pursuit of  OT goals.  Pt to f/u with Dr. Simpson 2/16/18.

## 2018-05-07 ENCOUNTER — CLINICAL SUPPORT (OUTPATIENT)
Dept: REHABILITATION | Facility: HOSPITAL | Age: 73
End: 2018-05-07
Payer: MEDICARE

## 2018-05-07 DIAGNOSIS — M12.811 ROTATOR CUFF ARTHROPATHY, RIGHT: ICD-10-CM

## 2018-05-07 PROCEDURE — 97110 THERAPEUTIC EXERCISES: CPT | Mod: PN

## 2018-05-07 NOTE — PROGRESS NOTES
Patient:  Sharita Gonzalez  [facility] #:  [id number]   Date of Note: 5/7/2018  Referring Physician: La Gallegos PA-C Dr. Sisco-Wise  Diagnosis:  R RCR      Start Time: 900 am  End Time:  946  am  Total Time:  46 min  38 min direct care  Group Time: -    20 of 20 visits expires 12/31/18   second auth 5 of 12 expires 12/31/18  Surgery 1/3/18    Subjective:   I was a little stiff this morning  Pain: 1 out of 10 pain pre therapy, some soreness 1-2/10 improvement in discomfort after therapy.    Objective:   Patient seen by OT this session. Treatment  consist of the following:  Therapeutic exercises    Treatment: Therapeutic exercises x 42 min  Pt engaged in supine 3# dowel flexion x 30 reps.  Passive stretch of right shoulder with soft end ranges noted today and less crepitus.    Exericses as follows:  3.5 # prone rows x 2 sets of 10 reps  Supine 1.5# h.adduction x 10 reps  Supine 3# tricep curls x 20 reps  Supine ER with yellow theraband x 2 sets 10 reps  Supine flexion with 2# x 20 reps   Sidelying Abduction 2x10  Sidelying gator 2x10  Matrix rows 25# x 2 sets of 10 reps  Matrix tricep curls 42.5# x 2 sets 10 reps  Matrix curls 10# 2x10    Prabha ball on table top 3 sets of 10 rotations CCW/CW  Pt rec'd ice x 8 min after ex.     Pt instructed to continue with his strengthening exercises every other day, pt to increase his supine flexion to 1.5# resistance.   Pt expressed understanding.     Plan of care rec'd from Dr. Simpson for care from  4/25/18 to 6/21/18  .    Assessment:  Pt states that he feels like he is getting a little stronger, understands it will take time. He has good PROM. States that he flet loser following therapy.  Pt will continue to benefit from skilled OT intervention. Medical necessity is demonstrated by: Pt continues to be limited in functional and leisurely pursuits. Pain limits patients participation in ADL's. Pt is not able to carryout necessary vocational tasks. Patient continues to requires  cues and skilled supervision to complete HEP      Plan:  Continue with plan of care 1-2 x week x 8 weeks  during the certification period from   4/25/18 to 6/21/18    in pursuit of  OT goals.  Pt to f/u with Dr. Simpson 2/16/18.

## 2018-05-09 ENCOUNTER — OFFICE VISIT (OUTPATIENT)
Dept: ORTHOPEDICS | Facility: CLINIC | Age: 73
End: 2018-05-09
Payer: MEDICARE

## 2018-05-09 VITALS
DIASTOLIC BLOOD PRESSURE: 75 MMHG | BODY MASS INDEX: 30.16 KG/M2 | SYSTOLIC BLOOD PRESSURE: 120 MMHG | HEART RATE: 73 BPM | HEIGHT: 65 IN | WEIGHT: 181 LBS

## 2018-05-09 DIAGNOSIS — G56.02 LEFT CARPAL TUNNEL SYNDROME: Primary | ICD-10-CM

## 2018-05-09 DIAGNOSIS — Z47.89 ORTHOPEDIC AFTERCARE: ICD-10-CM

## 2018-05-09 PROCEDURE — 99024 POSTOP FOLLOW-UP VISIT: CPT | Mod: S$GLB,,, | Performed by: PHYSICIAN ASSISTANT

## 2018-05-09 PROCEDURE — 99999 PR PBB SHADOW E&M-EST. PATIENT-LVL III: CPT | Mod: PBBFAC,,, | Performed by: PHYSICIAN ASSISTANT

## 2018-05-09 NOTE — PROGRESS NOTES
"Mr. Gonzalez is here today for a post-operative visit.  He is 16 days status post left carpal tunnel release, Left index finger A1 pulley release, Right carpal tunnel injection with steroid by Dr. Simpson on 4/23/18. He reports that he is doing well, no current pain.  He reports resolution of the finger triggering, improvement in finger numbness bilaterally.  He denies fever, chills, and sweats since the time of the surgery.     Still recovering from right shoulder surgery, attending PT/OT.    Physical exam:    Vitals:    05/09/18 0920   BP: 120/75   Pulse: 73   Weight: 82.1 kg (181 lb)   Height: 5' 5" (1.651 m)   PainSc: 0-No pain   PainLoc: Hand     Vital signs are stable, patient is afebrile.  Patient is well dressed and well groomed, no acute distress.  Alert and oriented to person, place, and time.  Post op dressing taken down.  Incision is clean, dry and intact.  There is no erythema or exudate.  There is no sign of any infection. He is NVI. Sutures removed without difficulty.  Good finger ROM.     Assessment: 16 days status post left carpal tunnel release, Left index finger A1 pulley release, Right carpal tunnel injection with steroid    Plan:  Sharita was seen today for post-op evaluation.    Diagnoses and all orders for this visit:    Left carpal tunnel syndrome    Orthopedic aftercare        - PO instruction reviewed and provided to patient  - Gel brace provided left wrist  - Right wrist carpal tunnel brace  - Follow-up as scheduled, we'll plan for right carpal tunnel release once the left is fully recovered  - Call with questions or concerns    "

## 2018-05-15 ENCOUNTER — CLINICAL SUPPORT (OUTPATIENT)
Dept: REHABILITATION | Facility: HOSPITAL | Age: 73
End: 2018-05-15
Payer: MEDICARE

## 2018-05-15 DIAGNOSIS — M79.601 RIGHT ARM PAIN: ICD-10-CM

## 2018-05-15 DIAGNOSIS — M12.811 ROTATOR CUFF ARTHROPATHY, RIGHT: Primary | ICD-10-CM

## 2018-05-15 DIAGNOSIS — M25.60 RANGE OF MOTION DEFICIT: ICD-10-CM

## 2018-05-15 DIAGNOSIS — Z98.890 S/P RIGHT ROTATOR CUFF REPAIR: ICD-10-CM

## 2018-05-15 PROCEDURE — 97110 THERAPEUTIC EXERCISES: CPT | Mod: PN

## 2018-05-15 NOTE — PROGRESS NOTES
Patient:  Sharita Gonzalez  [facility] #:  [id number]   Date of Note: 5/15/2018  Referring Physician: La Gallegos PA-C Dr. Sisco-Wise  Diagnosis:    Encounter Diagnoses   Name Primary?    Rotator cuff arthropathy, right Yes    Right arm pain     S/P right rotator cuff repair     Range of motion deficit         Start Time: 2:10  pm  End Time: 3:05 pm  Total Time:  55 min    Group Time: -    20 of 20 visits expires 12/31/18   second auth 5 of 12 expires 12/31/18  Surgery 1/3/18    Subjective:   Pt reports he did a lot of home repair and cut grass last week so hasn't really exercised much. Pt frustrated that he still is unable to raise his arm.   Pain: 1 out of 10 pain pre therapy, some soreness 1-2/10 improvement in discomfort after therapy.    Objective:   Patient seen by OT this session. Treatment  consist of the following:  Therapeutic exercises    Treatment: Therapeutic exercises x 47 min    Passive stretch of right shoulder with soft end ranges noted today, crepitus present. Isometric ER performed x 10 reps hold x 3 min.    Exericses as follows:  2# supine flexion x 20 reps  Attempted supine flexion against yellow band tethered to foot- unable to flex to 90  Performed eccentric hold of arm at 90 in supine with knee extension x 5 reps- very difficult to hold arm  Yellow theraband h. Abduction x 20 reps  Supine 1.5# h.adduction x 10 reps  Supine 3# tricep curls x 2 sets of 10 reps  Prone extension with ER x 20 reps with 3#  Prone rows with 3# x 20 reps    Pt rec'd ice x 8 min after ex.       Pt instructed to perform his strengthening exercises every other day and to perform his pulley stretches if he has been busy with honey do's around the house.   Pt expressed understanding.     Plan of care rec'd from Dr. Simpson for care from  4/25/18 to 6/21/18  .    Assessment:  Pt continues with weakness of right shoulder with resistive exercises.  Pt expressing frustration due to his slow progress.   Pt will continue to  benefit from skilled OT intervention. Medical necessity is demonstrated by: Pt continues to be limited in functional and leisurely pursuits. Pain limits patients participation in ADL's. Pt is not able to carryout necessary vocational tasks. Patient continues to requires cues and skilled supervision to complete HEP      Plan:  Continue with plan of care 1-2 x week x 8 weeks  during the certification period from   4/25/18 to 6/21/18  in pursuit of  OT goals.

## 2018-05-23 ENCOUNTER — CLINICAL SUPPORT (OUTPATIENT)
Dept: REHABILITATION | Facility: HOSPITAL | Age: 73
End: 2018-05-23
Payer: MEDICARE

## 2018-05-23 DIAGNOSIS — M79.601 RIGHT ARM PAIN: ICD-10-CM

## 2018-05-23 DIAGNOSIS — Z98.890 S/P RIGHT ROTATOR CUFF REPAIR: Primary | ICD-10-CM

## 2018-05-23 DIAGNOSIS — M25.60 RANGE OF MOTION DEFICIT: ICD-10-CM

## 2018-05-23 PROCEDURE — G8988 SELF CARE GOAL STATUS: HCPCS | Mod: CK,PN

## 2018-05-23 PROCEDURE — 97110 THERAPEUTIC EXERCISES: CPT | Mod: PN

## 2018-05-23 PROCEDURE — G8989 SELF CARE D/C STATUS: HCPCS | Mod: CK,PN

## 2018-05-23 NOTE — PROGRESS NOTES
Progress Note/ D/C Summary     Patient:  Sharita Gonzalez  [facility] #:  [id number]   Date of Note: 4/18/2018  Referring Physician: La Gallegos PA-C Dr. Sisco-Wise  Diagnosis:         Encounter Diagnoses   Name Primary?    Right arm pain Yes    S/P right rotator cuff repair      Range of motion deficit           Start Time:  8:30  am  End Time: 9:05 am  Total Time: 35 min      20 of 20 visits expires 12/31/18   second auth 5 of 12 expires 12/31/18  Surgery 1/3/18     Subjective:   Pt reports he is doing his exercises at home, but it doesn't really hurt.  Pain: 1 out of 10 soreness with movement-  0 out of 10 at best , 2-3 out of 10 at worst     Objective:   Patient seen by OT this session. Treatment  consist of the following:  Therapeutic exercises     Treatment: Therapeutic exercises x 35 min      Re-Assessment:     Right shoulder                AROM ( upright)     (supine)  Flexion 60(=)                     140  Abduction 70(+5)                105  Extension 60(=)  ER 50(=)                           60  IR 65(=) to waist  H. Adduction 10(=)     RUE strength:    Flexion/extension/adduction(all side body) 4+  IR 4/5,  ER 3+     Exercises as follows:  Red theraband rows and adduction x 10 reps, prone extension with 3# x 10 reps, supine h.abduction with shoulders x 10 reps, red theraband ER hold x 10 reps, ER doorway stretch instructed(previously performed)  Pt provided with written instructions for his HEP. Pt's questions re: prone exercise which can be performed on his bed or in pendulum position, therapist prefers on bed to avoid any back pain.  Pt demonstrated all exercises he is performing at home inclusive of his pulley and band exercises.  Pt reminded of the importance of quality of motion no necessarily quantity- he expressed understanding.       Patient is a 72 y.o. male referred to Occupational Therapy by  with a referral for eval and treat per protocol.  Patient received initial evaluation on  1/29/18 and returned for 24 treatment sessions. Patient had 0 missed visits. Patient's treatment included progression from PROM to strengthening, mobilization, progressive HEP, ice for discomfort and education re: his rehab process.      Assessment:  Pt's overall active motion status quo. Pt continues with weakness of his cuff and difficulty with elevation. Pt did return to fishing with modification of casting and reeling in, fishes with a ivanna.  Pt has a good understanding of his HEP with proper technique demonstrated.      Goals to be met in 8 weeks:  1) Pt will be independent and report performing HEP to maximize (R) shoulder functional capacity- met.  2) Pt will increase active right shoulder elevation to 120* to allow for overhead reaching- not met  3) Pt will report use of home modalities for pain management - met  4) Pt will report 1 of 10 pain with use of RUE- met at times  5) Pt will report interrupted sleep no more than twice a week- met  6) Increase RUE strength to 4+/5 to allow for household use for reaching- not met    G CODE TOOL: FOTO  Self care    Goal at Discharge Score 57 or 43% impaired  not met  Discharge status Score = 55 or 45% impaired      Plan:  Pt will be d/c from formal occupational therapy. Pt will continue with his strengthening and ROM exercise at home.  Pt encouraged to contact therapist for intermittent assessment of ROM and strength.

## 2018-06-27 ENCOUNTER — OFFICE VISIT (OUTPATIENT)
Dept: ORTHOPEDICS | Facility: CLINIC | Age: 73
End: 2018-06-27
Payer: MEDICARE

## 2018-06-27 VITALS
BODY MASS INDEX: 30.16 KG/M2 | SYSTOLIC BLOOD PRESSURE: 122 MMHG | WEIGHT: 181 LBS | DIASTOLIC BLOOD PRESSURE: 67 MMHG | HEART RATE: 71 BPM | HEIGHT: 65 IN

## 2018-06-27 DIAGNOSIS — G56.01 RIGHT CARPAL TUNNEL SYNDROME: Primary | ICD-10-CM

## 2018-06-27 PROCEDURE — 3074F SYST BP LT 130 MM HG: CPT | Mod: CPTII,S$GLB,, | Performed by: PHYSICIAN ASSISTANT

## 2018-06-27 PROCEDURE — 99214 OFFICE O/P EST MOD 30 MIN: CPT | Mod: 24,57,S$GLB, | Performed by: PHYSICIAN ASSISTANT

## 2018-06-27 PROCEDURE — 3078F DIAST BP <80 MM HG: CPT | Mod: CPTII,S$GLB,, | Performed by: PHYSICIAN ASSISTANT

## 2018-06-27 PROCEDURE — 99999 PR PBB SHADOW E&M-EST. PATIENT-LVL III: CPT | Mod: PBBFAC,,, | Performed by: PHYSICIAN ASSISTANT

## 2018-06-27 NOTE — PROGRESS NOTES
"Mr. Gonzalez is here today for a right carpal tunnel syndrome. He notes intermittent numbness which has gradually increased in frequency. He has associated pain which occasionally awakens him at night. Pt last received right carpal tunnel injection 4/23/18 during surgery for L CTR. He reports good relief from this injection however benefits were temporary, the injection has worn off. He is wearing a brace at night with minimal relief.      He is 2 mos status post left carpal tunnel release, Left index finger A1 pulley release, Right carpal tunnel injection with steroid by Dr. Simpson on 4/23/18. He reports that he is doing well.    He is also s/p right rotator cuff repair 1/3/18. He is compliant with doing his home exercise program. ROM gradually improving.     Physical exam:    Vitals:    06/27/18 1309   BP: 122/67   Pulse: 71   Weight: 82.1 kg (181 lb)   Height: 5' 5" (1.651 m)   PainSc: 0-No pain   PainLoc: Hand     Vital signs are stable, patient is afebrile.  Patient is well dressed and well groomed, no acute distress.  Alert and oriented to person, place, and time.  LUE  Incisions are well healed. There is no sign of any infection. He is NVI. Sutures removed without difficulty.  Good wrist and finger ROM.   RUE  Good wrist and finger motion. NVI. Positive tinels and durkans at the wrist.     Procedures  EMG 3/23/18  Impression   This study is abnormal. There is electrophysiologic evidence for chronic, bilateral median neuropathies at the wrists (carpal tunnel syndrome).     Assessment:   1. Right carpal tunnel   2. 2 months status post left carpal tunnel release, Left index finger A1 pulley release, Right carpal tunnel injection with steroid  3. s/p right rotator cuff repair 1/3/18    Plan:  Sharita was seen today for pain.    Diagnoses and all orders for this visit:    Right carpal tunnel syndrome    -Discussed treatment options with pt. He had very temporary relief with injection and minimal relief of " symptoms with bracing. He wishes to proceed with right carpal tunnel release. Consents reviewed and singed in clinic, all questions were answered.   -RTC post op       La Gallegos PA-C  [facility]   [facility]  [place], [place]

## 2018-06-28 DIAGNOSIS — G56.01 CARPAL TUNNEL SYNDROME, RIGHT: Primary | ICD-10-CM

## 2018-07-05 RX ORDER — ACETAMINOPHEN AND CODEINE PHOSPHATE 300; 30 MG/1; MG/1
1 TABLET ORAL EVERY 4 HOURS PRN
Qty: 20 TABLET | Refills: 0 | Status: SHIPPED | OUTPATIENT
Start: 2018-07-05 | End: 2018-07-15

## 2018-07-05 NOTE — H&P
[facility]  Orthopedics  H&P    Patient Name: Sharita Gonzalez  MRN: [id number]      Subjective:         HPI: Mr. Gonzalez is here today for a right carpal tunnel syndrome. He notes intermittent numbness which has gradually increased in frequency. He has associated pain which occasionally awakens him at night. Pt last received right carpal tunnel injection 4/23/18 during surgery for L CTR. He reports good relief from this injection however benefits were temporary, the injection has worn off. He is wearing a brace at night with minimal relief.       He is 2 mos status post left carpal tunnel release, Left index finger A1 pulley release, Right carpal tunnel injection with steroid by Dr. Simpson on 4/23/18. He reports that he is doing well.     He is also s/p right rotator cuff repair 1/3/18. He is compliant with doing his home exercise program. ROM gradually improving.     Past Medical History:   Diagnosis Date    Carpal tunnel syndrome     Hypertension     Polymyalgia     Prostate cancer        Past Surgical History:   Procedure Laterality Date    BRAIN SURGERY  age 27    subarrachnoid brain hemorhage     CATARACT EXTRACTION      FINGER SURGERY  9-17-13    left TFR    HEMORRHOID SURGERY      radiation seeds   2008    prostate cancer    SHOULDER SURGERY Right     rotator       Review of patient's allergies indicates:   Allergen Reactions    Statins-hmg-coa reductase inhibitors      Back ache with Lipitor     Adhesive Rash     Pulls skin off    May use paper tape    Latex, natural rubber Rash    Percocet [oxycodone-acetaminophen] Rash       No current facility-administered medications for this encounter.      Current Outpatient Prescriptions   Medication Sig    aspirin (ECOTRIN) 81 MG EC tablet Take 81 mg by mouth once daily.    gabapentin (NEURONTIN) 100 MG capsule Take 300 mg by mouth every evening.     multivitamin capsule Take 1 capsule by mouth once daily.    trandolapril (MAVIK)  4 MG Tab 4 mg every morning.     verapamil (CALAN-SR) 240 MG CR tablet Take 240 mg by mouth every morning.      Family History     None        Social History Main Topics    Smoking status: Light Tobacco Smoker     Types: Cigars    Smokeless tobacco: Never Used      Comment: smokes a cigar once a month    Alcohol use No      Comment: occasional beer     Drug use: No    Sexual activity: Not on file     ROS   Constitutional: negative for fevers  Eyes: no visual changes  ENT: negative for hearing loss  Respiratory: negative for dyspnea  Cardiovascular: negative for chest pain  Gastrointestinal: negative for abdominal pain  Genitourinary: negative for dysuria  Neurological: negative for headaches  Behavioral/Psych: negative for hallucinations  Endocrine: negative for temperature intolerance    Objective:     Vital Signs (Most Recent):    Vital Signs (24h Range):                Ortho/SPM Exam  Vitals: Afebrile.  Vital signs stable.  General: No acute distress.  HEENT: Normocephalic. Atraumatic. Sclera anicteric. No tracheal deviation.  Cardio: Regular rate and rhythm.  Chest: No increased work of breathing.  Abdominal: Nondistended.  Extremities: No cyanosis.  No clubbing.  No edema.  Palpable pulses.  Skin: No generalized rash.  Neuro: Awake. Alert. Oriented to person, place, time, and situation.  Psych: Normal appearance. Cooperative.  Appropriate mood.  Appropriate affect.      LUE  Incisions are well healed. There is no sign of any infection. He is NVI. Sutures removed without difficulty.  Good wrist and finger ROM.   RUE  Good wrist and finger motion. NVI. Positive tinels and durkans at the wrist.     Significant Labs: All pertinent labs within the past 24 hours have been reviewed.    Significant Imaging: I have reviewed all pertinent imaging results/findings.    Assessment/Plan:     Assessment:   1. Right carpal tunnel   2. 2 months status post left carpal tunnel release, Left index finger A1 pulley release,  Right carpal tunnel injection with steroid  3. s/p right rotator cuff repair 1/3/18     Plan:  Sharita was seen today for pain.     Diagnoses and all orders for this visit:     Right carpal tunnel syndrome     -Discussed treatment options with pt. He had very temporary relief with injection and minimal relief of symptoms with bracing. He wishes to proceed with right carpal tunnel release. Consents reviewed and singed in clinic, all questions were answered.   -RTC post op       Александр Fernandez MD  Orthopedics  [facility]

## 2018-07-06 ENCOUNTER — TELEPHONE (OUTPATIENT)
Dept: ORTHOPEDICS | Facility: CLINIC | Age: 73
End: 2018-07-06

## 2018-07-06 NOTE — TELEPHONE ENCOUNTER
Sharita Gonzalez notified of arrival time 0730 for surgery on 7/9/18 with Dr. LUZ Simpson at [facility]. Post Op appointment made, slip in mail.

## 2018-07-09 ENCOUNTER — SURGERY (OUTPATIENT)
Age: 73
End: 2018-07-09

## 2018-07-09 ENCOUNTER — ANESTHESIA (OUTPATIENT)
Dept: SURGERY | Facility: OTHER | Age: 73
End: 2018-07-09
Payer: MEDICARE

## 2018-07-09 ENCOUNTER — ANESTHESIA EVENT (OUTPATIENT)
Dept: SURGERY | Facility: OTHER | Age: 73
End: 2018-07-09
Payer: MEDICARE

## 2018-07-09 ENCOUNTER — HOSPITAL ENCOUNTER (OUTPATIENT)
Facility: OTHER | Age: 73
Discharge: HOME | End: 2018-07-09
Attending: ORTHOPAEDIC SURGERY | Admitting: ORTHOPAEDIC SURGERY
Payer: MEDICARE

## 2018-07-09 VITALS
RESPIRATION RATE: 16 BRPM | WEIGHT: 179 LBS | HEIGHT: 65 IN | BODY MASS INDEX: 29.82 KG/M2 | OXYGEN SATURATION: 95 % | DIASTOLIC BLOOD PRESSURE: 81 MMHG | TEMPERATURE: 98 F | HEART RATE: 68 BPM | SYSTOLIC BLOOD PRESSURE: 125 MMHG

## 2018-07-09 DIAGNOSIS — G56.00 CARPAL TUNNEL SYNDROME: ICD-10-CM

## 2018-07-09 DIAGNOSIS — G56.02 LEFT CARPAL TUNNEL SYNDROME: Primary | ICD-10-CM

## 2018-07-09 PROCEDURE — 71000016 HC POSTOP RECOV ADDL HR: Performed by: ORTHOPAEDIC SURGERY

## 2018-07-09 PROCEDURE — 37000008 HC ANESTHESIA 1ST 15 MINUTES: Performed by: ORTHOPAEDIC SURGERY

## 2018-07-09 PROCEDURE — 37000009 HC ANESTHESIA EA ADD 15 MINS: Performed by: ORTHOPAEDIC SURGERY

## 2018-07-09 PROCEDURE — 36000707: Performed by: ORTHOPAEDIC SURGERY

## 2018-07-09 PROCEDURE — 25000003 PHARM REV CODE 250: Performed by: STUDENT IN AN ORGANIZED HEALTH CARE EDUCATION/TRAINING PROGRAM

## 2018-07-09 PROCEDURE — 71000015 HC POSTOP RECOV 1ST HR: Performed by: ORTHOPAEDIC SURGERY

## 2018-07-09 PROCEDURE — 63600175 PHARM REV CODE 636 W HCPCS: Performed by: STUDENT IN AN ORGANIZED HEALTH CARE EDUCATION/TRAINING PROGRAM

## 2018-07-09 PROCEDURE — 63600175 PHARM REV CODE 636 W HCPCS: Performed by: NURSE ANESTHETIST, CERTIFIED REGISTERED

## 2018-07-09 PROCEDURE — 25000003 PHARM REV CODE 250: Performed by: ORTHOPAEDIC SURGERY

## 2018-07-09 PROCEDURE — 36000706: Performed by: ORTHOPAEDIC SURGERY

## 2018-07-09 PROCEDURE — 64721 CARPAL TUNNEL SURGERY: CPT | Mod: 79,RT,, | Performed by: ORTHOPAEDIC SURGERY

## 2018-07-09 PROCEDURE — 25000003 PHARM REV CODE 250: Performed by: SPECIALIST

## 2018-07-09 RX ORDER — BACITRACIN ZINC 500 UNIT/G
OINTMENT (GRAM) TOPICAL
Status: DISCONTINUED | OUTPATIENT
Start: 2018-07-09 | End: 2018-07-09 | Stop reason: HOSPADM

## 2018-07-09 RX ORDER — MUPIROCIN 20 MG/G
1 OINTMENT TOPICAL 2 TIMES DAILY
Status: DISCONTINUED | OUTPATIENT
Start: 2018-07-09 | End: 2018-07-09 | Stop reason: HOSPADM

## 2018-07-09 RX ORDER — ACETAMINOPHEN 325 MG/1
650 TABLET ORAL EVERY 4 HOURS PRN
Status: DISCONTINUED | OUTPATIENT
Start: 2018-07-09 | End: 2018-07-09 | Stop reason: HOSPADM

## 2018-07-09 RX ORDER — CEFAZOLIN SODIUM 2 G/50ML
2 SOLUTION INTRAVENOUS
Status: COMPLETED | OUTPATIENT
Start: 2018-07-09 | End: 2018-07-09

## 2018-07-09 RX ORDER — MIDAZOLAM HYDROCHLORIDE 1 MG/ML
INJECTION INTRAMUSCULAR; INTRAVENOUS
Status: DISCONTINUED | OUTPATIENT
Start: 2018-07-09 | End: 2018-07-09

## 2018-07-09 RX ORDER — ONDANSETRON 8 MG/1
8 TABLET, ORALLY DISINTEGRATING ORAL EVERY 8 HOURS PRN
Status: DISCONTINUED | OUTPATIENT
Start: 2018-07-09 | End: 2018-07-09 | Stop reason: HOSPADM

## 2018-07-09 RX ORDER — SODIUM CHLORIDE, SODIUM LACTATE, POTASSIUM CHLORIDE, CALCIUM CHLORIDE 600; 310; 30; 20 MG/100ML; MG/100ML; MG/100ML; MG/100ML
INJECTION, SOLUTION INTRAVENOUS CONTINUOUS PRN
Status: DISCONTINUED | OUTPATIENT
Start: 2018-07-09 | End: 2018-07-09

## 2018-07-09 RX ORDER — PROMETHAZINE HYDROCHLORIDE 25 MG/1
25 TABLET ORAL EVERY 6 HOURS PRN
Status: DISCONTINUED | OUTPATIENT
Start: 2018-07-09 | End: 2018-07-09 | Stop reason: HOSPADM

## 2018-07-09 RX ORDER — SODIUM CHLORIDE 9 MG/ML
INJECTION, SOLUTION INTRAVENOUS CONTINUOUS
Status: DISCONTINUED | OUTPATIENT
Start: 2018-07-09 | End: 2018-07-09 | Stop reason: HOSPADM

## 2018-07-09 RX ORDER — LIDOCAINE HCL/PF 100 MG/5ML
SYRINGE (ML) INTRAVENOUS
Status: DISCONTINUED | OUTPATIENT
Start: 2018-07-09 | End: 2018-07-09

## 2018-07-09 RX ORDER — HYDROCODONE BITARTRATE AND ACETAMINOPHEN 5; 325 MG/1; MG/1
1 TABLET ORAL EVERY 4 HOURS PRN
Status: DISCONTINUED | OUTPATIENT
Start: 2018-07-09 | End: 2018-07-09 | Stop reason: HOSPADM

## 2018-07-09 RX ORDER — HYDROCODONE BITARTRATE AND ACETAMINOPHEN 10; 325 MG/1; MG/1
1 TABLET ORAL EVERY 4 HOURS PRN
Status: DISCONTINUED | OUTPATIENT
Start: 2018-07-09 | End: 2018-07-09 | Stop reason: HOSPADM

## 2018-07-09 RX ORDER — MUPIROCIN 20 MG/G
OINTMENT TOPICAL
Status: DISCONTINUED | OUTPATIENT
Start: 2018-07-09 | End: 2018-07-09 | Stop reason: HOSPADM

## 2018-07-09 RX ORDER — PROPOFOL 10 MG/ML
VIAL (ML) INTRAVENOUS CONTINUOUS PRN
Status: DISCONTINUED | OUTPATIENT
Start: 2018-07-09 | End: 2018-07-09

## 2018-07-09 RX ORDER — FENTANYL CITRATE 50 UG/ML
INJECTION, SOLUTION INTRAMUSCULAR; INTRAVENOUS
Status: DISCONTINUED | OUTPATIENT
Start: 2018-07-09 | End: 2018-07-09

## 2018-07-09 RX ORDER — BUPIVACAINE HYDROCHLORIDE 2.5 MG/ML
INJECTION, SOLUTION EPIDURAL; INFILTRATION; INTRACAUDAL
Status: DISCONTINUED | OUTPATIENT
Start: 2018-07-09 | End: 2018-07-09 | Stop reason: HOSPADM

## 2018-07-09 RX ORDER — ONDANSETRON 2 MG/ML
INJECTION INTRAMUSCULAR; INTRAVENOUS
Status: DISCONTINUED | OUTPATIENT
Start: 2018-07-09 | End: 2018-07-09

## 2018-07-09 RX ORDER — SODIUM CHLORIDE 0.9 % (FLUSH) 0.9 %
5 SYRINGE (ML) INJECTION
Status: DISCONTINUED | OUTPATIENT
Start: 2018-07-09 | End: 2018-07-09 | Stop reason: HOSPADM

## 2018-07-09 RX ORDER — LIDOCAINE HYDROCHLORIDE 10 MG/ML
INJECTION, SOLUTION EPIDURAL; INFILTRATION; INTRACAUDAL; PERINEURAL
Status: DISCONTINUED | OUTPATIENT
Start: 2018-07-09 | End: 2018-07-09 | Stop reason: HOSPADM

## 2018-07-09 RX ADMIN — LIDOCAINE HYDROCHLORIDE 50 MG: 20 INJECTION, SOLUTION INTRAVENOUS at 10:07

## 2018-07-09 RX ADMIN — ONDANSETRON 4 MG: 2 INJECTION INTRAMUSCULAR; INTRAVENOUS at 10:07

## 2018-07-09 RX ADMIN — BUPIVACAINE HYDROCHLORIDE 10 ML: 2.5 INJECTION, SOLUTION EPIDURAL; INFILTRATION; INTRACAUDAL; PERINEURAL at 10:07

## 2018-07-09 RX ADMIN — LIDOCAINE HYDROCHLORIDE 10 ML: 10 INJECTION, SOLUTION EPIDURAL; INFILTRATION; INTRACAUDAL; PERINEURAL at 10:07

## 2018-07-09 RX ADMIN — PROPOFOL 100 MCG/KG/MIN: 10 INJECTION, EMULSION INTRAVENOUS at 10:07

## 2018-07-09 RX ADMIN — BACITRACIN ZINC 1 G: 500 OINTMENT TOPICAL at 10:07

## 2018-07-09 RX ADMIN — FENTANYL CITRATE 100 MCG: 50 INJECTION, SOLUTION INTRAMUSCULAR; INTRAVENOUS at 10:07

## 2018-07-09 RX ADMIN — MUPIROCIN: 20 OINTMENT TOPICAL at 08:07

## 2018-07-09 RX ADMIN — CEFAZOLIN SODIUM 2 G: 2 SOLUTION INTRAVENOUS at 10:07

## 2018-07-09 RX ADMIN — MIDAZOLAM HYDROCHLORIDE 2 MG: 1 INJECTION, SOLUTION INTRAMUSCULAR; INTRAVENOUS at 09:07

## 2018-07-09 RX ADMIN — SODIUM CHLORIDE, SODIUM LACTATE, POTASSIUM CHLORIDE, AND CALCIUM CHLORIDE: 600; 310; 30; 20 INJECTION, SOLUTION INTRAVENOUS at 09:07

## 2018-07-09 NOTE — BRIEF OP NOTE
[facility]  Brief Operative Note     SUMMARY     Surgery Date: 7/9/2018     Surgeon(s) and Role:     * Christine Simpson MD - Primary    Assisting Surgeon: None    Pre-op Diagnosis:  Carpal tunnel syndrome, right [G56.01]    Post-op Diagnosis:  Post-Op Diagnosis Codes:     * Carpal tunnel syndrome, right [G56.01]    Procedure(s) (LRB):  RELEASE, CARPAL TUNNEL - RIGHT (Right)    Anesthesia: Local MAC    Description of the findings of the procedure: see op note      Estimated Blood Loss: minimal         Specimens:   Specimen (12h ago through future)    None          Discharge Note    SUMMARY     Admit Date: 7/9/2018    Discharge Date and Time:  07/09/2018 10:46 AM    Hospital Course (synopsis of major diagnoses, care, treatment, and services provided during the course of the hospital stay): Patient presented for above procedure.  Tolerated it well and was discharged home POD0 after voiding, tolerating diet, ambulating, pain controlled.  Discharge instructions, follow-up appointment, and med rec are below.       Final Diagnosis: Post-Op Diagnosis Codes:     * Carpal tunnel syndrome, right [G56.01]    Disposition: Home or Self Care    Follow Up/Patient Instructions:     Medications:  Reconciled Home Medications:      Medication List      CONTINUE taking these medications    acetaminophen-codeine 300-30mg 300-30 mg Tab  Commonly known as:  TYLENOL #3  Take 1 tablet by mouth every 4 (four) hours as needed.     aspirin 81 MG EC tablet  Commonly known as:  ECOTRIN  Take 81 mg by mouth once daily.     gabapentin 100 MG capsule  Commonly known as:  NEURONTIN  Take 300 mg by mouth every evening.     multivitamin capsule  Take 1 capsule by mouth once daily.     trandolapril 4 MG Tab  Commonly known as:  MAVIK  4 mg every morning.     verapamil 240 MG CR tablet  Commonly known as:  CALAN-SR  Take 240 mg by mouth every morning.            Discharge Procedure Orders  Diet general     Call MD for:   temperature >100.4     Call MD for:  persistent nausea and vomiting     Call MD for:  severe uncontrolled pain     Call MD for:  difficulty breathing, headache or visual disturbances     Call MD for:  redness, tenderness, or signs of infection (pain, swelling, redness, odor or green/yellow discharge around incision site)     Call MD for:  hives     Call MD for:  persistent dizziness or light-headedness     Call MD for:  extreme fatigue     Sponge bath only until clinic visit     Keep surgical extremity elevated     Lifting restrictions   Order Comments: NWB RUE, no lifting     No driving, operating heavy equipment or signing legal documents while taking pain medication.     Leave dressing on - Keep it clean, dry, and intact until clinic visit       Follow-up Information     La Gallegos PA-C In 2 weeks.    Specialties:  Hand Surgery, Orthopedic Surgery  Contact information:  Elayne WILLETT  [facility] [address] [phone]

## 2018-07-09 NOTE — OP NOTE
DATE OF PROCEDURE: 7/9/18  SERVICE: Orthopedics.   ATTENDING SURGEON: Christine Simpson M.D.   ASSISTANT SURGEON: Eduardo  PREOPERATIVE DIAGNOSIS: right carpal tunnel syndrome.   POSTOPERATIVE DIAGNOSIS: right carpal tunnel syndrome.   PROCEDURE: right carpal tunnel release.   ANESTHESIA: Local placed by surgeon and MAC.   FLUIDS: Lactated Ringers.   BLOOD LOSS: None. No blood was given.   TOURNIQUET TIME: 12 minutes.   PACKS AND DRAINS: None.   IMPLANTS: None.   SPECIMENS: None.   COMPLICATIONS: None.   INDICATIONS FOR PROCEDURE: Mr. Gonzalez is a 72-year-old male who had numbness   and tingling into her right hand. He has failed conservative treatment, EMG   was positive for carpal tunnel syndrome. Therefore, operative intervention was   deemed necessary. Risks and benefits were explained to the patient in clinic   since performed in clinic.   PROCEDURE IN DETAIL: After correct site was marked with the patient's   participation in the holding area, the patient was brought to the Operating   Room, placed in supine position, underwent MAC anesthesia. A well-padded   nonsterile tourniquet was placed on the right arm. A time-out was called for   correct site, procedure and patient to be indicated. Under sterile conditions,   an injection of lidocaine 1% was injected into the carpal tunnel space. The arm   was prepped and draped in normal sterile fashion. The incision was marked out   using Gonsalez cardinal lines. The arm was exsanguinated using Esmarch.   Tourniquet was insufflated to 250 mmHg and that is where it remained for a total   of 12 minutes. The incision was made down to the palmar fascia. The palmar   fascia was sharply incised. The transverse ligament was identified. It was   very thick in nature. It was sharply incised. Median nerve was identified. A   Mosquito hemostat was passed from the undersurface of the transverse ligament   proximally and distally to free it from the median nerve. Rosario  scissors   were utilized to cut the transverse ligament proximally and distally. Once that   was completely released, a Mosquito hemostat was passed again to confirm a   complete release. Once that was performed, the area was irrigated with copious   amounts of normal saline. Nylon closed the skin. Sterile dressing was applied.   Tourniquet was deflated. Brisk capillary refill ensued. The patient was   transported to the Recovery Room in stable condition.   POSTOPERATIVE PLAN FOR THIS PATIENT: She is to keep her dressing clean, dry and   intact. We will see her back in 2 weeks for stitch removal.

## 2018-07-09 NOTE — ANESTHESIA PREPROCEDURE EVALUATION
07/09/2018  Sharita Gonzalez is a 72 y.o., male.    Pre-op Assessment    I have reviewed the Patient Summary Reports.     I have reviewed the Nursing Notes.   I have reviewed the Medications.     Review of Systems  Anesthesia Hx:  No problems with previous Anesthesia  History of prior surgery of interest to airway management or planning: Previous anesthesia: General 2/15 trigger finger with local anesthesia and propofol infusion, did well with general anesthesia.  Denies Family Hx of Anesthesia complications.   Denies Personal Hx of Anesthesia complications.   Social:  No Alcohol Use, Non-Smoker    Hematology/Oncology:  Hematology Normal   Oncology Normal     EENT/Dental:EENT/Dental Normal   Cardiovascular:   Exercise tolerance: good Hypertension, well controlled    Pulmonary:  Pulmonary Normal    Renal/:  Renal/ Normal     Hepatic/GI:  Hepatic/GI Normal    Musculoskeletal:  Musculoskeletal Normal    Neurological:  Neurology Normal    Endocrine:  Endocrine Normal    Dermatological:  Skin Normal    Psych:  Psychiatric Normal           Physical Exam  General:  Well nourished      Dental:  Dental Findings: molar caps             Anesthesia Plan  Type of Anesthesia, risks & benefits discussed:  Anesthesia Type:  MAC  Patient's Preference:   Intra-op Monitoring Plan: standard ASA monitors  Intra-op Monitoring Plan Comments:   Post Op Pain Control Plan: multimodal analgesia  Post Op Pain Control Plan Comments:   Induction:   IV  Beta Blocker:         Informed Consent: Patient understands risks and agrees with Anesthesia plan.  Questions answered. Anesthesia consent signed with patient.  ASA Score: 2     Day of Surgery Review of History & Physical:    H&P update referred to the surgeon.     Anesthesia Plan Notes: Had MAC before wo problem,plan MAC        Ready For Surgery From Anesthesia Perspective.

## 2018-07-09 NOTE — ANESTHESIA POSTPROCEDURE EVALUATION
"Anesthesia Post Evaluation    Patient: Sharita Gonzalez    Procedure(s) Performed: Procedure(s) (LRB):  RELEASE, CARPAL TUNNEL - RIGHT (Right)    Final Anesthesia Type: general  Patient location during evaluation: [facility]  Patient participation: Yes- Able to Participate  Level of consciousness: awake and alert and oriented  Post-procedure vital signs: reviewed and stable  Pain management: adequate  Airway patency: patent  PONV status at discharge: No PONV  Anesthetic complications: no      Cardiovascular status: hemodynamically stable  Respiratory status: unassisted, spontaneous ventilation and room air  Hydration status: euvolemic  Follow-up not needed.        Visit Vitals  BP (!) 153/77 (BP Location: Left arm, Patient Position: Lying)   Pulse 69   Temp 36.9 °C (98.4 °F) (Oral)   Resp 18   Ht 5' 5" (1.651 m)   Wt 81.2 kg (179 lb)   SpO2 98%   BMI 29.79 kg/m²       Pain/Felicita Score: Pain Assessment Performed: Yes (7/9/2018  7:51 AM)  Presence of Pain: denies (7/9/2018  7:51 AM)      "

## 2018-07-09 NOTE — DISCHARGE INSTRUCTIONS
Discharge Instructions for Carpal Tunnel Release  You had a carpal tunnel release procedure to help relieve the symptoms of carpal tunnel syndrome. In carpal tunnel syndrome, a nerve in the wrist is compressed and irritated. This causes numbness and pain in the fingers and hand. Carpal tunnel release relieves the compression of the nerve. Here are instructions that will help you care for your arm and wrist when you are at home.  Home care  · Avoid gripping objects tightly or lifting with your affected arm.  · Wear your bandage, splint, or cast as directed by your doctor.  · Always keep the dressing, splint, or cast dry and clean.  · When showering, cover your hand and  wrist with plastic and use tape or rubberbands to keep the dressing, splint, or cast dry. Shower as necessary.    · Use an ice pack or bag of frozen peas -- or something similar -- wrapped in a thin towel on your wrist to reduce swelling for the first 48 hours. Leave the ice pack on for 20 minutes; then take it off for 20 minutes. Repeat as needed.  · Keep your arm elevated above your heart for 24 to 48 hours after surgery.  · Do the exercises you learned in the hospital, or as instructed by your doctor.  · Take pain medicine as directed.  · Dont drive until your doctor says its OK. Never drive while you are taking opioid pain medicine.  · Ask your doctor when you can return to work. If your job requires heavy lifting, you may not be able to begin working again for several weeks.  Follow-up care  Make a follow-up appointment as directed by your doctor.     When to seek medical care  Call 911 right away if you have any of the following:  · Chest pain  · Shortness of breath  Otherwise, call your doctor immediately if you have any of the following:  · A splint, cast, or dressing that has gotten wet  · Increased bleeding or drainage from the incision (cut)  · Opening of the incision  · Fever above 100.4°F (38.9°C) taken by mouth, or shaking  chills  · Any new numbness in the fingers or thumb  · Blue hand or fingers  · Increased pain with or without activity  · Increased redness, tenderness, or swelling of the incision   Date Last Reviewed: 11/15/2015  © 8394-8663 [employer]. [address] [place] [address]. All rights reserved. This information is not intended as a substitute for professional medical care. Always follow your healthcare professional's instructions.        Anesthesia: Monitored Anesthesia Care (MAC)    Anesthesia Safety  · Have an adult family member or friend drive you home after the procedure.  · For the first 24 hours after your surgery:  ¨ Do not drive or use heavy equipment.  ¨ Do not make important decisions or sign documents.  ¨ Avoid alcohol.  ¨ Have someone stay with you, if possible. They can watch for problems and help keep you F\    Follow any other instructions given to you by Dr Hernández

## 2018-07-23 ENCOUNTER — OFFICE VISIT (OUTPATIENT)
Dept: ORTHOPEDICS | Facility: CLINIC | Age: 73
End: 2018-07-23
Payer: MEDICARE

## 2018-07-23 VITALS
HEIGHT: 65 IN | DIASTOLIC BLOOD PRESSURE: 70 MMHG | SYSTOLIC BLOOD PRESSURE: 107 MMHG | HEART RATE: 80 BPM | WEIGHT: 179 LBS | BODY MASS INDEX: 29.82 KG/M2

## 2018-07-23 DIAGNOSIS — Z98.890 S/P CARPAL TUNNEL RELEASE: Primary | ICD-10-CM

## 2018-07-23 DIAGNOSIS — Z98.890 S/P RIGHT ROTATOR CUFF REPAIR: ICD-10-CM

## 2018-07-23 PROCEDURE — 99024 POSTOP FOLLOW-UP VISIT: CPT | Mod: S$GLB,,, | Performed by: PHYSICIAN ASSISTANT

## 2018-07-23 PROCEDURE — 99999 PR PBB SHADOW E&M-EST. PATIENT-LVL III: CPT | Mod: PBBFAC,,, | Performed by: PHYSICIAN ASSISTANT

## 2018-07-23 NOTE — PROGRESS NOTES
"Mr. Gonzalez is here today for a post-operative visit.  He is 14 days status post right carpal tunnel release by Dr. Simpson on 7/9/18. He reports that he is doing well. He did take his post operative dressing off two days ago due to it getting dirty. He covered the incision with a bandaid. He has not gotten it wet.  Pain is 0/10.  He is not taking pain medication.  He denies fever, chills, and sweats since the time of the surgery.     He is also s/p right RCR 1/3/18, doing well. Motion continue to gradually improve.       Physical exam:    Vitals:    07/23/18 1427   BP: 107/70   Pulse: 80   Weight: 81.2 kg (179 lb)   Height: 5' 5" (1.651 m)   PainSc: 0-No pain   PainLoc: Hand     Vital signs are stable, patient is afebrile.  Patient is well dressed and well groomed, no acute distress.  Alert and oriented to person, place, and time.  Post op dressing taken down.  Incision is clean, dry and intact.  There is no erythema or exudate.  There is no sign of any infection. He is NVI. Sutures removed without difficulty. Good finger and wrist motion. Improving shoulder ROM.     Assessment: status post right carpal tunnel release by Dr. Simpson on 7/9/18    Plan:  Sharita was seen today for pain.    Diagnoses and all orders for this visit:    S/P carpal tunnel release    S/P right rotator cuff repair    - PO instruction reviewed and provided to patient  -right gel brace given   -RTC 4 wks if needed, 6 mos for 1 yr RCR f/u      La Gallegos PA-C  [facility]   [facility] [place]        "

## 2018-10-26 ENCOUNTER — CLINICAL SUPPORT (OUTPATIENT)
Dept: FAMILY MEDICINE | Facility: CLINIC | Age: 73
End: 2018-10-26
Payer: MEDICARE

## 2018-10-26 VITALS — TEMPERATURE: 98 F

## 2018-10-26 DIAGNOSIS — Z23 NEED FOR INFLUENZA VACCINATION: Primary | ICD-10-CM

## 2018-10-26 PROCEDURE — 99999 PR PBB SHADOW E&M-EST. PATIENT-LVL II: CPT | Mod: PBBFAC,,,

## 2018-10-26 PROCEDURE — 90662 IIV NO PRSV INCREASED AG IM: CPT | Mod: PBBFAC,PO

## 2018-10-26 PROCEDURE — 99212 OFFICE O/P EST SF 10 MIN: CPT | Mod: PBBFAC,PO,25

## 2019-07-15 ENCOUNTER — LAB VISIT (OUTPATIENT)
Dept: LAB | Facility: HOSPITAL | Age: 74
End: 2019-07-15
Attending: INTERNAL MEDICINE
Payer: MEDICARE

## 2019-07-15 ENCOUNTER — OFFICE VISIT (OUTPATIENT)
Dept: RHEUMATOLOGY | Facility: CLINIC | Age: 74
End: 2019-07-15
Payer: MEDICARE

## 2019-07-15 VITALS
HEIGHT: 65 IN | SYSTOLIC BLOOD PRESSURE: 133 MMHG | BODY MASS INDEX: 30.97 KG/M2 | HEART RATE: 66 BPM | DIASTOLIC BLOOD PRESSURE: 77 MMHG | WEIGHT: 185.88 LBS

## 2019-07-15 DIAGNOSIS — M35.3 POLYMYALGIA RHEUMATICA: Primary | ICD-10-CM

## 2019-07-15 DIAGNOSIS — M54.50 LUMBAR PAIN: ICD-10-CM

## 2019-07-15 DIAGNOSIS — M35.3 POLYMYALGIA RHEUMATICA: ICD-10-CM

## 2019-07-15 PROBLEM — Z85.46 HISTORY OF PROSTATE CANCER: Status: ACTIVE | Noted: 2018-11-06

## 2019-07-15 PROBLEM — G70.00 MYASTHENIA GRAVIS: Status: ACTIVE | Noted: 2019-07-12

## 2019-07-15 LAB
CRP SERPL-MCNC: 56.1 MG/L (ref 0–8.2)
ERYTHROCYTE [SEDIMENTATION RATE] IN BLOOD BY WESTERGREN METHOD: 20 MM/HR (ref 0–23)

## 2019-07-15 PROCEDURE — 99999 PR PBB SHADOW E&M-EST. PATIENT-LVL III: CPT | Mod: PBBFAC,,, | Performed by: INTERNAL MEDICINE

## 2019-07-15 PROCEDURE — 1101F PR PT FALLS ASSESS DOC 0-1 FALLS W/OUT INJ PAST YR: ICD-10-PCS | Mod: CPTII,S$GLB,, | Performed by: INTERNAL MEDICINE

## 2019-07-15 PROCEDURE — 85652 RBC SED RATE AUTOMATED: CPT

## 2019-07-15 PROCEDURE — 86140 C-REACTIVE PROTEIN: CPT

## 2019-07-15 PROCEDURE — 99214 OFFICE O/P EST MOD 30 MIN: CPT | Mod: S$GLB,,, | Performed by: INTERNAL MEDICINE

## 2019-07-15 PROCEDURE — 1101F PT FALLS ASSESS-DOCD LE1/YR: CPT | Mod: CPTII,S$GLB,, | Performed by: INTERNAL MEDICINE

## 2019-07-15 PROCEDURE — 3078F PR MOST RECENT DIASTOLIC BLOOD PRESSURE < 80 MM HG: ICD-10-PCS | Mod: CPTII,S$GLB,, | Performed by: INTERNAL MEDICINE

## 2019-07-15 PROCEDURE — 36415 COLL VENOUS BLD VENIPUNCTURE: CPT

## 2019-07-15 PROCEDURE — 99999 PR PBB SHADOW E&M-EST. PATIENT-LVL III: ICD-10-PCS | Mod: PBBFAC,,, | Performed by: INTERNAL MEDICINE

## 2019-07-15 PROCEDURE — 3078F DIAST BP <80 MM HG: CPT | Mod: CPTII,S$GLB,, | Performed by: INTERNAL MEDICINE

## 2019-07-15 PROCEDURE — 3075F SYST BP GE 130 - 139MM HG: CPT | Mod: CPTII,S$GLB,, | Performed by: INTERNAL MEDICINE

## 2019-07-15 PROCEDURE — 3075F PR MOST RECENT SYSTOLIC BLOOD PRESS GE 130-139MM HG: ICD-10-PCS | Mod: CPTII,S$GLB,, | Performed by: INTERNAL MEDICINE

## 2019-07-15 PROCEDURE — 99214 PR OFFICE/OUTPT VISIT, EST, LEVL IV, 30-39 MIN: ICD-10-PCS | Mod: S$GLB,,, | Performed by: INTERNAL MEDICINE

## 2019-07-15 RX ORDER — PREDNISONE 10 MG/1
10 TABLET ORAL DAILY
Qty: 30 TABLET | Refills: 0 | Status: SHIPPED | OUTPATIENT
Start: 2019-07-15 | End: 2019-07-31

## 2019-07-15 NOTE — PROGRESS NOTES
"Subjective:       Patient ID: Sharita Gonzalez is a 73 y.o. male.    Chief Complaint: Disease Management    HPI   Follow-up polymyalgia rheumatica  History of PMR 2012 that went into remission. Symptoms reoccurred 2015 and pred restarted; tapered and discontinued 2017      He is worried his PMR is returning  4 or 5 mo of R sided flank and hip pain  Sleeps ok and some morning stiffness  Better if more active  No shoulder pain    Developed myasthenia gravis; dx by Dr Shrestha who Rx mestinon but it gave diarrhea  Currently c/o no weakness    No giant cell arteritis symptoms including headache, jaw pain, or diplopia or visual disturbance     Review of Systems   Constitutional: Negative for fatigue, fever and unexpected weight change.   HENT: Negative for mouth sores.    Respiratory: Negative for cough and shortness of breath.    Cardiovascular: Negative for chest pain.   Gastrointestinal: Negative for diarrhea.   Genitourinary: Negative for dysuria.   Skin: Negative for rash.   Neurological: Negative for headaches.        Burning bottom of feet   Psychiatric/Behavioral: Negative for sleep disturbance.         Objective:   /77   Pulse 66   Ht 5' 5" (1.651 m)   Wt 84.3 kg (185 lb 13.6 oz)   BMI 30.93 kg/m²      Physical Exam   Musculoskeletal: He exhibits no edema, tenderness or deformity.   Area of sx is L flank; no palpable lesions; pain not reproduced with examination            Lab Results   Component Value Date    SEDRATE 20 07/15/2019      2016 renal stone CT showed DISH in thoracic spine and degen discs lumbar 3-4 and 4-5 levels  Assessment:       1. Polymyalgia rheumatica    2. Lumbar pain        L flank pain more suggestive of muscular pain related to lumbar disease but will recheck inflammatory markers     Will treat with prednisone x 1 week then PT if ESR/CRP ok    Plan:       Problem List Items Addressed This Visit        Active Problems    Polymyalgia rheumatica - Primary     Current sx limited to L " flank that is more suggestive of regional muscular pain    Will repeat ESR and CRP  Can treat with 1 week of prednisone and follow with PT for lumbar muscle pain         Relevant Orders    C-reactive protein (Completed)    Sedimentation rate (Completed)      Other Visit Diagnoses     Lumbar pain          later:  Lab Results   Component Value Date    SEDRATE 20 07/15/2019     Lab Results   Component Value Date    CRP 56.1 (H) 07/15/2019      CRP has risen significantly so will restart prednisone 10 mg/ d and repeat ESR and CRP in 2 weeks; plan discussed with patient by telephone

## 2019-07-15 NOTE — ASSESSMENT & PLAN NOTE
Current sx limited to L flank that is more suggestive of regional muscular pain    Will repeat ESR and CRP  Can treat with 1 week of prednisone and follow with PT for lumbar muscle pain

## 2019-07-29 ENCOUNTER — LAB VISIT (OUTPATIENT)
Dept: LAB | Facility: HOSPITAL | Age: 74
End: 2019-07-29
Attending: INTERNAL MEDICINE
Payer: MEDICARE

## 2019-07-29 DIAGNOSIS — M35.3 POLYMYALGIA RHEUMATICA: ICD-10-CM

## 2019-07-29 LAB
CRP SERPL-MCNC: 0.9 MG/L (ref 0–8.2)
ERYTHROCYTE [SEDIMENTATION RATE] IN BLOOD BY WESTERGREN METHOD: <2 MM/HR (ref 0–23)

## 2019-07-29 PROCEDURE — 86140 C-REACTIVE PROTEIN: CPT

## 2019-07-29 PROCEDURE — 36415 COLL VENOUS BLD VENIPUNCTURE: CPT | Mod: PO

## 2019-07-29 PROCEDURE — 85652 RBC SED RATE AUTOMATED: CPT

## 2019-07-30 ENCOUNTER — PATIENT MESSAGE (OUTPATIENT)
Dept: RHEUMATOLOGY | Facility: CLINIC | Age: 74
End: 2019-07-30

## 2019-07-30 DIAGNOSIS — M35.3 POLYMYALGIA RHEUMATICA: Primary | ICD-10-CM

## 2019-07-31 ENCOUNTER — PATIENT MESSAGE (OUTPATIENT)
Dept: RHEUMATOLOGY | Facility: CLINIC | Age: 74
End: 2019-07-31

## 2019-07-31 DIAGNOSIS — M35.3 POLYMYALGIA RHEUMATICA: ICD-10-CM

## 2019-07-31 RX ORDER — PREDNISONE 5 MG/1
10 TABLET ORAL DAILY
Qty: 30 TABLET | Refills: 2 | Status: SHIPPED | OUTPATIENT
Start: 2019-07-31 | End: 2019-10-03 | Stop reason: SDUPTHER

## 2019-07-31 RX ORDER — PREDNISONE 1 MG/1
4 TABLET ORAL DAILY
Qty: 120 TABLET | Refills: 2 | Status: SHIPPED | OUTPATIENT
Start: 2019-07-31 | End: 2019-11-18

## 2019-09-27 ENCOUNTER — LAB VISIT (OUTPATIENT)
Dept: LAB | Facility: HOSPITAL | Age: 74
End: 2019-09-27
Attending: INTERNAL MEDICINE
Payer: MEDICARE

## 2019-09-27 DIAGNOSIS — M35.3 POLYMYALGIA RHEUMATICA: ICD-10-CM

## 2019-09-27 LAB
CRP SERPL-MCNC: 1.2 MG/L (ref 0–8.2)
ERYTHROCYTE [SEDIMENTATION RATE] IN BLOOD BY WESTERGREN METHOD: 4 MM/HR (ref 0–23)

## 2019-09-27 PROCEDURE — 36415 COLL VENOUS BLD VENIPUNCTURE: CPT | Mod: PO

## 2019-09-27 PROCEDURE — 86140 C-REACTIVE PROTEIN: CPT

## 2019-09-27 PROCEDURE — 85652 RBC SED RATE AUTOMATED: CPT

## 2019-10-03 ENCOUNTER — PATIENT MESSAGE (OUTPATIENT)
Dept: RHEUMATOLOGY | Facility: CLINIC | Age: 74
End: 2019-10-03

## 2019-10-03 DIAGNOSIS — M35.3 POLYMYALGIA RHEUMATICA: ICD-10-CM

## 2019-10-03 RX ORDER — PREDNISONE 5 MG/1
5 TABLET ORAL DAILY
Qty: 30 TABLET | Refills: 2 | Status: SHIPPED | OUTPATIENT
Start: 2019-10-03 | End: 2019-11-18

## 2019-10-21 ENCOUNTER — PATIENT MESSAGE (OUTPATIENT)
Dept: RHEUMATOLOGY | Facility: CLINIC | Age: 74
End: 2019-10-21

## 2019-10-22 ENCOUNTER — TELEPHONE (OUTPATIENT)
Dept: RHEUMATOLOGY | Facility: CLINIC | Age: 74
End: 2019-10-22

## 2019-10-22 NOTE — TELEPHONE ENCOUNTER
----- Message from Tessa Zheng sent at 10/22/2019  9:50 AM CDT -----  Contact: pt  Reason: Pt left a [facility] message on yesterday pertaining to dosage of meds. and have not received a response he ask for a call to advise. Please call to advise    Communication: [phone]

## 2019-10-29 ENCOUNTER — LAB VISIT (OUTPATIENT)
Dept: LAB | Facility: HOSPITAL | Age: 74
End: 2019-10-29
Attending: INTERNAL MEDICINE
Payer: MEDICARE

## 2019-10-29 ENCOUNTER — CLINICAL SUPPORT (OUTPATIENT)
Dept: FAMILY MEDICINE | Facility: CLINIC | Age: 74
End: 2019-10-29
Payer: MEDICARE

## 2019-10-29 DIAGNOSIS — Z23 NEED FOR INFLUENZA VACCINATION: Primary | ICD-10-CM

## 2019-10-29 DIAGNOSIS — M35.3 POLYMYALGIA RHEUMATICA: ICD-10-CM

## 2019-10-29 LAB
CRP SERPL-MCNC: 0.8 MG/L (ref 0–8.2)
ERYTHROCYTE [SEDIMENTATION RATE] IN BLOOD BY WESTERGREN METHOD: <2 MM/HR (ref 0–23)

## 2019-10-29 PROCEDURE — G0008 ADMIN INFLUENZA VIRUS VAC: HCPCS | Mod: S$GLB,,, | Performed by: FAMILY MEDICINE

## 2019-10-29 PROCEDURE — 85652 RBC SED RATE AUTOMATED: CPT

## 2019-10-29 PROCEDURE — 90662 IIV NO PRSV INCREASED AG IM: CPT | Mod: S$GLB,,, | Performed by: FAMILY MEDICINE

## 2019-10-29 PROCEDURE — 99999 PR PBB SHADOW E&M-EST. PATIENT-LVL I: ICD-10-PCS | Mod: PBBFAC,,,

## 2019-10-29 PROCEDURE — 86140 C-REACTIVE PROTEIN: CPT

## 2019-10-29 PROCEDURE — 90662 FLU VACCINE - HIGH DOSE (65+) PRESERVATIVE FREE IM: ICD-10-PCS | Mod: S$GLB,,, | Performed by: FAMILY MEDICINE

## 2019-10-29 PROCEDURE — G0008 PR ADMIN INFLUENZA VIRUS VAC: ICD-10-PCS | Mod: S$GLB,,, | Performed by: FAMILY MEDICINE

## 2019-10-29 PROCEDURE — 36415 COLL VENOUS BLD VENIPUNCTURE: CPT | Mod: PO

## 2019-10-29 PROCEDURE — 99999 PR PBB SHADOW E&M-EST. PATIENT-LVL I: CPT | Mod: PBBFAC,,,

## 2019-11-18 ENCOUNTER — PATIENT MESSAGE (OUTPATIENT)
Dept: RHEUMATOLOGY | Facility: CLINIC | Age: 74
End: 2019-11-18

## 2019-11-18 DIAGNOSIS — M35.3 POLYMYALGIA RHEUMATICA: ICD-10-CM

## 2019-11-18 RX ORDER — PREDNISONE 1 MG/1
4 TABLET ORAL DAILY
Qty: 120 TABLET | Refills: 2 | Status: ON HOLD | OUTPATIENT
Start: 2019-11-18 | End: 2024-01-03 | Stop reason: HOSPADM

## 2019-11-18 RX ORDER — PREDNISONE 5 MG/1
10 TABLET ORAL DAILY
Qty: 60 TABLET | Refills: 2 | Status: SHIPPED | OUTPATIENT
Start: 2019-11-18

## 2020-02-05 ENCOUNTER — OFFICE VISIT (OUTPATIENT)
Dept: RHEUMATOLOGY | Facility: CLINIC | Age: 75
End: 2020-02-05
Payer: MEDICARE

## 2020-02-05 VITALS
HEART RATE: 81 BPM | WEIGHT: 189.81 LBS | DIASTOLIC BLOOD PRESSURE: 82 MMHG | BODY MASS INDEX: 31.63 KG/M2 | SYSTOLIC BLOOD PRESSURE: 137 MMHG | HEIGHT: 65 IN

## 2020-02-05 DIAGNOSIS — G70.00 MYASTHENIA GRAVIS: ICD-10-CM

## 2020-02-05 DIAGNOSIS — M35.3 POLYMYALGIA RHEUMATICA: Primary | ICD-10-CM

## 2020-02-05 PROBLEM — G56.00 CARPAL TUNNEL SYNDROME: Status: RESOLVED | Noted: 2018-07-09 | Resolved: 2020-02-05

## 2020-02-05 PROBLEM — R60.9 EDEMA: Status: RESOLVED | Noted: 2017-12-18 | Resolved: 2020-02-05

## 2020-02-05 PROCEDURE — 1126F AMNT PAIN NOTED NONE PRSNT: CPT | Mod: S$GLB,,, | Performed by: INTERNAL MEDICINE

## 2020-02-05 PROCEDURE — 99999 PR PBB SHADOW E&M-EST. PATIENT-LVL III: CPT | Mod: PBBFAC,,, | Performed by: INTERNAL MEDICINE

## 2020-02-05 PROCEDURE — 3079F DIAST BP 80-89 MM HG: CPT | Mod: CPTII,S$GLB,, | Performed by: INTERNAL MEDICINE

## 2020-02-05 PROCEDURE — 99214 OFFICE O/P EST MOD 30 MIN: CPT | Mod: S$GLB,,, | Performed by: INTERNAL MEDICINE

## 2020-02-05 PROCEDURE — 1159F MED LIST DOCD IN RCRD: CPT | Mod: S$GLB,,, | Performed by: INTERNAL MEDICINE

## 2020-02-05 PROCEDURE — 1159F PR MEDICATION LIST DOCUMENTED IN MEDICAL RECORD: ICD-10-PCS | Mod: S$GLB,,, | Performed by: INTERNAL MEDICINE

## 2020-02-05 PROCEDURE — 99999 PR PBB SHADOW E&M-EST. PATIENT-LVL III: ICD-10-PCS | Mod: PBBFAC,,, | Performed by: INTERNAL MEDICINE

## 2020-02-05 PROCEDURE — 3075F SYST BP GE 130 - 139MM HG: CPT | Mod: CPTII,S$GLB,, | Performed by: INTERNAL MEDICINE

## 2020-02-05 PROCEDURE — 3075F PR MOST RECENT SYSTOLIC BLOOD PRESS GE 130-139MM HG: ICD-10-PCS | Mod: CPTII,S$GLB,, | Performed by: INTERNAL MEDICINE

## 2020-02-05 PROCEDURE — 3079F PR MOST RECENT DIASTOLIC BLOOD PRESSURE 80-89 MM HG: ICD-10-PCS | Mod: CPTII,S$GLB,, | Performed by: INTERNAL MEDICINE

## 2020-02-05 PROCEDURE — 99214 PR OFFICE/OUTPT VISIT, EST, LEVL IV, 30-39 MIN: ICD-10-PCS | Mod: S$GLB,,, | Performed by: INTERNAL MEDICINE

## 2020-02-05 PROCEDURE — 1126F PR PAIN SEVERITY QUANTIFIED, NO PAIN PRESENT: ICD-10-PCS | Mod: S$GLB,,, | Performed by: INTERNAL MEDICINE

## 2020-02-05 RX ORDER — VENLAFAXINE HYDROCHLORIDE 75 MG/1
75 CAPSULE, EXTENDED RELEASE ORAL
COMMUNITY
Start: 2019-12-20

## 2020-02-05 ASSESSMENT — ROUTINE ASSESSMENT OF PATIENT INDEX DATA (RAPID3)
TOTAL RAPID3 SCORE: .33
PATIENT GLOBAL ASSESSMENT SCORE: 0
AM STIFFNESS SCORE: 0, NO
PSYCHOLOGICAL DISTRESS SCORE: 2.2
FATIGUE SCORE: 2
MDHAQ FUNCTION SCORE: .3
PAIN SCORE: 0

## 2020-02-05 NOTE — ASSESSMENT & PLAN NOTE
Currently asymptomatic on prednisone 20 mg/d that was prescribed for myasthenia gravis ; treatment of myasthenia will likely address PMR so will defer prednisone to neurology for now    If in the future his prednisone is lowered such that PMR reoccurs then will re-evaluate    Plan RTC 6 mo with ESR/CRP

## 2020-02-05 NOTE — PROGRESS NOTES
"Subjective:       Patient ID: Sharita Gonzalez is a 74 y.o. male.    Chief Complaint: Disease Management    HPI   Follow-up polymyalgia rheumatica  History of PMR 2012 that went into remission. Symptoms reoccurred 2015 and pred restarted; tapered and discontinued 2017; restarted July 2019 when more hip pain with ESR 20, CRP 56     Interval dx of myasthenia gravis July 2019    15 mg/d prednisone relieved PMR sx; no more stiffness or hip or shoulder pain  When tapered pred to 7 mg his myasthenia worsened ; neuro increased pred to 20 mg/d and now he is better    Review of Systems   Constitutional: Negative for fatigue, fever and unexpected weight change.   HENT: Negative for mouth sores.    Respiratory: Negative for cough and shortness of breath.    Cardiovascular: Negative for chest pain.   Gastrointestinal: Negative for diarrhea.   Genitourinary: Negative for dysuria.   Skin: Negative for rash.   Neurological: Negative for headaches.   Psychiatric/Behavioral: Negative for sleep disturbance.         Objective:   /82   Pulse 81   Ht 5' 5" (1.651 m)   Wt 86.1 kg (189 lb 13.1 oz)   BMI 31.59 kg/m²      Physical Exam      Lab Results   Component Value Date    SEDRATE <2 10/29/2019      Lab Results   Component Value Date    CRP 0.8 10/29/2019      Assessment:       1. Polymyalgia rheumatica    2. Myasthenia gravis            Plan:       Problem List Items Addressed This Visit        Active Problems    Polymyalgia rheumatica - Primary     Currently asymptomatic on prednisone 20 mg/d that was prescribed for myasthenia gravis ; treatment of myasthenia will likely address PMR so will defer prednisone to neurology for now    If in the future his prednisone is lowered such that PMR reoccurs then will re-evaluate    Plan RTC 6 mo with ESR/CRP             Myasthenia gravis            "

## 2020-03-04 DIAGNOSIS — M35.3 POLYMYALGIA RHEUMATICA: ICD-10-CM

## 2020-03-04 RX ORDER — PREDNISONE 5 MG/1
10 TABLET ORAL DAILY
Qty: 60 TABLET | Refills: 2 | OUTPATIENT
Start: 2020-03-04

## 2020-03-04 NOTE — TELEPHONE ENCOUNTER
Please notify patient that drugstore needs to ask neurology for prednisone refill since they now handle if for his myasthenia     WD

## 2020-05-22 ENCOUNTER — PATIENT MESSAGE (OUTPATIENT)
Dept: RHEUMATOLOGY | Facility: CLINIC | Age: 75
End: 2020-05-22

## 2020-05-25 ENCOUNTER — HOSPITAL ENCOUNTER (OUTPATIENT)
Dept: RADIOLOGY | Facility: HOSPITAL | Age: 75
Discharge: HOME | End: 2020-05-25
Attending: PODIATRIST
Payer: MEDICARE

## 2020-05-25 ENCOUNTER — OFFICE VISIT (OUTPATIENT)
Dept: PODIATRY | Facility: CLINIC | Age: 75
End: 2020-05-25
Payer: MEDICARE

## 2020-05-25 VITALS
SYSTOLIC BLOOD PRESSURE: 134 MMHG | DIASTOLIC BLOOD PRESSURE: 80 MMHG | HEIGHT: 65 IN | BODY MASS INDEX: 31.63 KG/M2 | WEIGHT: 189.81 LBS

## 2020-05-25 DIAGNOSIS — M79.671 RIGHT FOOT PAIN: ICD-10-CM

## 2020-05-25 DIAGNOSIS — M79.671 RIGHT FOOT PAIN: Primary | ICD-10-CM

## 2020-05-25 DIAGNOSIS — M20.5X9 HALLUX LIMITUS, UNSPECIFIED LATERALITY: ICD-10-CM

## 2020-05-25 PROCEDURE — 99499 UNLISTED E&M SERVICE: CPT | Mod: S$GLB,,, | Performed by: PODIATRIST

## 2020-05-25 PROCEDURE — 99499 NO LOS: ICD-10-PCS | Mod: S$GLB,,, | Performed by: PODIATRIST

## 2020-05-25 PROCEDURE — 20600 PR DRAIN/INJECT SMALL JOINT/BURSA: ICD-10-PCS | Mod: RT,S$GLB,, | Performed by: PODIATRIST

## 2020-05-25 PROCEDURE — 20600 DRAIN/INJ JOINT/BURSA W/O US: CPT | Mod: RT,S$GLB,, | Performed by: PODIATRIST

## 2020-05-25 PROCEDURE — 99999 PR PBB SHADOW E&M-EST. PATIENT-LVL III: ICD-10-PCS | Mod: PBBFAC,,, | Performed by: PODIATRIST

## 2020-05-25 PROCEDURE — 73630 X-RAY EXAM OF FOOT: CPT | Mod: TC,FY,PO,RT

## 2020-05-25 PROCEDURE — 73630 XR FOOT COMPLETE 3 VIEW RIGHT: ICD-10-PCS | Mod: 26,RT,, | Performed by: RADIOLOGY

## 2020-05-25 PROCEDURE — 99999 PR PBB SHADOW E&M-EST. PATIENT-LVL III: CPT | Mod: PBBFAC,,, | Performed by: PODIATRIST

## 2020-05-25 PROCEDURE — 73630 X-RAY EXAM OF FOOT: CPT | Mod: 26,RT,, | Performed by: RADIOLOGY

## 2020-05-25 RX ORDER — DEXAMETHASONE SODIUM PHOSPHATE 4 MG/ML
2 INJECTION, SOLUTION INTRA-ARTICULAR; INTRALESIONAL; INTRAMUSCULAR; INTRAVENOUS; SOFT TISSUE ONCE
Status: COMPLETED | OUTPATIENT
Start: 2020-05-25 | End: 2020-05-25

## 2020-05-25 RX ADMIN — DEXAMETHASONE SODIUM PHOSPHATE 2 MG: 4 INJECTION, SOLUTION INTRA-ARTICULAR; INTRALESIONAL; INTRAMUSCULAR; INTRAVENOUS; SOFT TISSUE at 03:05

## 2020-05-25 NOTE — PROGRESS NOTES
Subjective:      Patient ID: Sharita Gonzalez is a 74 y.o. male.    Chief Complaint: Foot Pain (right great toe)    Sharita is a 74 y.o. male who presents to the podiatry clinic  with complaint of  right foot pain. Right 1st MPJ pain worse with weightbearing.  Onset of the symptoms was several months ago. Precipitating event: none known. Current symptoms include: ability to bear weight, but with some pain. Aggravating factors: any weight bearing. Symptoms have progressed to a point and plateaued. Patient has had prior foot problems. Evaluation to date: none. Treatment to date: none. Patients rates pain 4/10 on pain scale.        Review of Systems   Constitution: Negative for chills, diaphoresis and fever.   Cardiovascular: Negative for claudication, cyanosis, leg swelling and syncope.   Respiratory: Negative for cough and shortness of breath.    Skin: Negative for color change, nail changes and suspicious lesions.   Musculoskeletal: Positive for joint pain and myalgias. Negative for falls, muscle cramps and muscle weakness.   Gastrointestinal: Negative for diarrhea, nausea and vomiting.   Neurological: Negative for disturbances in coordination, numbness, paresthesias, sensory change, tremors and weakness.   Psychiatric/Behavioral: Negative for altered mental status.           Objective:      Physical Exam   Constitutional: He appears well-developed. He is cooperative.   Oriented to time, place, and person.   Cardiovascular:   DP and PT pulses are palpable bilaterally. 3 sec capillary refill time and toes and feet are warm to touch proximally .  There is  hair growth on the feet and toes b/l. There is no edema b/l. No spider veins or varicosities present b/l.      Musculoskeletal:   Equinus noted b/l ankles with < 10 deg DF noted. MMT 5/5 in DF/PF/Inv/Ev resistance with no reproduction of pain in any direction. Passive range of motion of ankle and pedal joints is painless b/l.    Pain upon DF/PF right 1st MPJ. Limited  ROM. Pain upon palpation dorsal 1st MPJ      Feet:   Right Foot:   Skin Integrity: Negative for callus or dry skin.   Left Foot:   Skin Integrity: Negative for callus or dry skin.   Lymphadenopathy:   Negative lymphadenopathy bilateral popliteal fossa and tarsal tunnel.   Neurological: He is alert.   Light touch, proprioception, and sharp/dull sensation are all intact bilaterally. Protective threshold with the [place]-Wienstein monofilament is intact bilaterally.    Skin:   No open lesions, lacerations or wounds noted.Interdigital spaces clean, dry and intact b/l. No erythema noted to b/l foot.  Nails normal color and trophic qualities.     Psychiatric: He has a normal mood and affect.             Assessment:       Encounter Diagnoses   Name Primary?    Right foot pain Yes    Hallux limitus, unspecified laterality          Plan:       Sharita was seen today for foot pain.    Diagnoses and all orders for this visit:    Right foot pain  -     X-Ray Foot Complete Right; Future    Hallux limitus, unspecified laterality    Other orders  -     dexamethasone injection 2 mg      I counseled the patient on his conditions, their implications and medical management.    Weightbearing right foot xray to assess underlying deformity and for underlying osseous pathology.     Discussed conservative treatment with shoes of adequate dimensions, material, and style to alleviate symptoms and delay or prevent surgical intervention.    Patient would like injection today. Skin was prepped with alcohol and anesthetized with ethyl chloride.  The following mixture was injected into right 1st MPJ dorsal  approach: 1.5ccs of mixture of (1cc 1% plain Lidocaine :  0.5cc dexamethasone) directly into problematic areas.  Patient  tolerated the injection well.

## 2020-08-30 NOTE — PLAN OF CARE
Occupational Therapy Evaluation    Patient: Sharita Gonzalez   [facility] #: [id number]  Date of evaluation: 08/22/2017     Referring Physician: Christine Simpson, *  Diagnosis:   Encounter Diagnoses   Name Primary?    Strain of muscle, fascia and tendon of long head of biceps, right arm, initial encounter Yes    Right arm pain      Referral Orders: Eval and Treat  Age: 71 y.o.  Sex: male          Hand dominance: Right    Time In: 8:00 am  Time Out: 8:50 am     1 of 20 visits exp 12/31/17    Occupation: retired  Working presently: No  Last time worked: 9 years  Workmen's Compensation: No    Date of onset: May 2017  Involved area: right  Mechanism of Injury: Pt reports that he was carrying a butane tank in his hand and tripped and jolted his right arm.    Past Medical History:   Diagnosis Date    Hypertension     Polymyalgia     Prostate cancer     previous proximal bicep tendon repair    Precautions:   Current Outpatient Prescriptions   Medication Sig    aspirin 81 mg Tab Take 81 mg by mouth. 1 Tablet Oral Every day    multivitamin (THERAGRAN) per tablet Take by mouth. 1 Tablet Oral Every day    predniSONE (DELTASONE) 5 MG tablet Take 2 tablets (10 mg total) by mouth once daily.    trandolapril (MAVIK) 4 MG Tab     verapamil (CALAN-SR) 240 MG CR tablet Take 240 mg by mouth every evening.     No current facility-administered medications for this visit.      Review of patient's allergies indicates:   Allergen Reactions    Adhesive Rash    Latex, natural rubber Rash     X-Rays/Tests: Findings: AP, lateral and oblique radiographs of the right elbow demonstrate a tiny enthesophyte at the lateral epicondyles.  Otherwise, the osseous structures, soft tissues and joint spaces are normal.    MRI: Findings: Status post biceps tenodesis.  Proximal biceps tendon is not well evaluated on large field-of-view examination.  Biceps muscle is unremarkable.  No evidence for strain or myotendinous injury.  The distal biceps  [FreeTextEntry1] : Get chest CT scan with IV contrast to definitively rule out lung sarcoidosis tendon is intact to the level of the elbow joint.  Insertional tendon is not evaluated.  No fluid collections or masses.  Neurovascular structures are unremarkable.  Bone marrow signal is maintained.    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  More fatigue then pain    Pain:  During no work: 2/10  While workin-2/10  Sleepin-2/10  Location of pain: right bicep/tricep areas and shoulder joint    Sharita's goals for therapy are: Get my arm to function normal and not have it fatigue    Objective:  Sensation Test: Patient denies any numbness/tingling, sensation is grossly intact    Observation/Inspection   Left Right   Anatomic Symmetry normal normal   Bony normal normal   Suparspinatus normal normal   Infraspinatus normal normal   Rhomboid normal normal     Observation/Inspection:  Good posture, no dyskinesia in scapula noted      Range of Motion:   Right: IR 65 to waist  20 h. Adduction  All others WFL, slight crepitus with end range abduction, feels bicep with supination  Left: IR 75 to mid back  30 h. adduction    Painful Arc:   Patient demonstrates no painful arc in shoulder flexion or abduction      Strength  Shoulder Flexion RUE: 4+/5   Shoulder Extension RUE: 4+/5   Shoulder Abduction RUE:  3/5   Shoulder Adduction RUE:  4+/5   Internal Rotation RUE: 4-/5   External Rotation RUE: 3+/5   Elbow flexion/ ext RUE: 4+/5 / 4+/5   Shoulder Flexion LUE: 4+/5   Shoulder Extension LUE: 4+/5   Shoulder Abduction LUE: 3+/5   Shoulder Adduction LUE: 4+/5   Internal Rotation LUE:  4+/5   External Rotation LUE:  4-/5   Elbow flexion/ extension  4+/5  / 4-/5       Pull Tests: n/t  Abduction:   Ext. Rotation:     Special Tests:  Positive: Empty can test  Negative: Neer Impingement and Speed's Test      Palpation: (for pain)     Positive: Bicipital Groove - slight     Negative: Lateral Subacromial Space, Anterior Subacromial Space, Posterior Subacromial Space, Infraspinatus  Region, AC joint and Supraspinatus Region    Cultural/Spiritual/Education Needs: none noted or reported  Barriers to Learning: none noted or reported    Limitations of Functional Status:   Self Care: no difficulty with any self care tasks  Work: able to lift and carry groceries, able to cut grass, has difficulty with the weedeater at times, has to switch hands with blowing the grass, right arm fatigues with cleaning off boat.  Able to carry ice chests  Leisure: arm fatigues with casting during [place]    Treatment included: OT evaluation, the following exercises (HEP) were instructed and Sharita was able to demonstrate them prior to the end of the session. HEP are as follows:   theraband scapular retraction(horizontal rows), adduction and ER.  Pt provided with non latex theraband yellow and written instructions.  Education re: shoulder structure and pathology      Assessment  This 71 y.o. male referred to Outpatient Occupational Therapy with diagnosis of   Encounter Diagnoses   Name Primary?    Strain of muscle, fascia and tendon of long head of biceps, right arm, initial encounter Yes    Right arm pain     presents with limitations as described in problem list. Patient can benefit from Occupational Therapy services for Ultrasound, moist heat, ice, strengthening, Theraband Ex and UBE. The following goals were discussed with the patient and he is in agreement with them as to be addressed in the treatment plan.     Problem List:   Decreased function of Right UE, Increased pain, Decreased strength and difficulty with leisure activiites    Goals to be met in 8 weeks:  1) Pt will be independent and report performing HEP to maximize (R) shoulder functional capacity.  2) Pt will increase right shoulder strength shoulder to 4+/5 to engage in leisure tasks  3) Pt will report use of home modalities for pain management.  4) Pt will report 0 out of 10 with use.      G CODE TOOL: FOTO  Self care  Current Score  = 60 or 40%  impaired   Goal at Discharge Score 72 or 28% impaired   Discharge status Score =  or % impaired     Score interpretation is as follows:     TEST SCORE  Modifier  Impairment Limitation Restriction    0%  CH  0 % impaired, limited or restricted    1-19%  CI  @ least 1% but less than 20% impaired, limited or restricted    20-39%  CJ  @ least 20%<40% impaired, limited or restricted    40-59%  CK  @ least 40%<60% impaired, limited or restricted    60-79%  CL  @ least 60% <80% impaired, limited or restricted    80-99%  CM  @ least 80%<100% impaired limited or restricted    100%  CN  100% impaired, limited or restricted     History Examination Decision Making Complexity Score   Occupational Profile:   Retired     Medical and Therapy History:   Hx of bicep repair of right bicep, polymyalgia, no therapy for present condition    Brief               Performance Deficits     Physical  Weakness of right shoulder, fatigue reported, difficulty with leisure and home tasks      Cognitive intact      Psychosocial:  Pt actively engaged in leisure and care of his wife       FOTO score 40% limitation    Pt will benefit from strengthening of RUE, modalities to promote healing, progressive HEP  1 x week x 8 weeks    Pt was provided with HEP today to begin cuff strengthening.    LOW       Plan  Sharita to be treated by Occupational Therapy 1 times per week for 8weeks to achieve the established goals during certification period of 8/22/17 to 9/17/17.   Treatment to include strengthening, manual therapy, progressive HEP, modalities prn as well as any other treatments deemed necessary based on the patient's needs or progress.         I certify the need for these services furnished under this plan of treatment and while under my care    ____________________________________  Physician/Referring Practitioner    _______________  Date of Signature

## 2020-11-16 ENCOUNTER — CLINICAL SUPPORT (OUTPATIENT)
Dept: FAMILY MEDICINE | Facility: CLINIC | Age: 75
End: 2020-11-16
Payer: MEDICARE

## 2020-11-16 VITALS — TEMPERATURE: 99 F

## 2020-11-16 DIAGNOSIS — Z23 NEEDS FLU SHOT: Primary | ICD-10-CM

## 2020-11-16 PROCEDURE — G0008 FLU VACCINE - QUADRIVALENT - ADJUVANTED: ICD-10-PCS | Mod: S$GLB,,, | Performed by: INTERNAL MEDICINE

## 2020-11-16 PROCEDURE — 90694 FLU VACCINE - QUADRIVALENT - ADJUVANTED: ICD-10-PCS | Mod: S$GLB,,, | Performed by: INTERNAL MEDICINE

## 2020-11-16 PROCEDURE — G0008 ADMIN INFLUENZA VIRUS VAC: HCPCS | Mod: S$GLB,,, | Performed by: INTERNAL MEDICINE

## 2020-11-16 PROCEDURE — 90694 VACC AIIV4 NO PRSRV 0.5ML IM: CPT | Mod: S$GLB,,, | Performed by: INTERNAL MEDICINE

## 2020-11-16 PROCEDURE — 99999 PR PBB SHADOW E&M-EST. PATIENT-LVL I: CPT | Mod: PBBFAC,,,

## 2020-11-16 PROCEDURE — 99999 PR PBB SHADOW E&M-EST. PATIENT-LVL I: ICD-10-PCS | Mod: PBBFAC,,,

## 2021-02-05 ENCOUNTER — CLINICAL SUPPORT (OUTPATIENT)
Dept: URGENT CARE | Facility: CLINIC | Age: 76
End: 2021-02-05
Payer: MEDICARE

## 2021-02-05 DIAGNOSIS — Z11.9 ENCOUNTER FOR SCREENING EXAMINATION FOR INFECTIOUS DISEASE: Primary | ICD-10-CM

## 2021-02-05 LAB
CTP QC/QA: YES
SARS-COV-2 RDRP RESP QL NAA+PROBE: NEGATIVE

## 2021-02-05 PROCEDURE — U0002 COVID-19 LAB TEST NON-CDC: HCPCS | Mod: QW,S$GLB,, | Performed by: INTERNAL MEDICINE

## 2021-02-05 PROCEDURE — U0002: ICD-10-PCS | Mod: QW,S$GLB,, | Performed by: INTERNAL MEDICINE

## 2021-02-10 NOTE — TRANSFER OF CARE
"Anesthesia Transfer of Care Note    Patient: Sharita Gonzalez    Procedure(s) Performed: Procedure(s) (LRB):  REPAIR ROTATOR CUFF ARTHROSCOPIC right (Right)    Patient location: [facility]    Anesthesia Type: general and regional    Transport from OR: Transported from OR on 6-10 L/min O2 by face mask with adequate spontaneous ventilation    Post pain: adequate analgesia    Post assessment: no apparent anesthetic complications and tolerated procedure well    Post vital signs: stable    Level of consciousness: awake    Nausea/Vomiting: no nausea/vomiting    Complications: none    Transfer of care protocol was followed      Last vitals:   Visit Vitals  /68 (BP Location: Left arm, Patient Position: Lying)   Pulse 82   Temp 36.5 °C (97.7 °F) (Temporal)   Resp 16   Ht 5' 5" (1.651 m)   Wt 82.1 kg (181 lb)   SpO2 99%   BMI 30.12 kg/m²     "
normal
bilateral upper extremities/bilateral lower extremities/normal

## 2021-02-23 ENCOUNTER — PATIENT MESSAGE (OUTPATIENT)
Dept: RHEUMATOLOGY | Facility: CLINIC | Age: 76
End: 2021-02-23

## 2021-04-15 ENCOUNTER — PATIENT MESSAGE (OUTPATIENT)
Dept: RESEARCH | Facility: HOSPITAL | Age: 76
End: 2021-04-15

## 2022-04-20 ENCOUNTER — CLINICAL SUPPORT (OUTPATIENT)
Dept: REHABILITATION | Facility: HOSPITAL | Age: 77
End: 2022-04-20
Payer: MEDICARE

## 2022-04-20 DIAGNOSIS — M62.81 MUSCLE WEAKNESS OF UPPER EXTREMITY: ICD-10-CM

## 2022-04-20 DIAGNOSIS — R29.3 POSTURAL IMBALANCE: ICD-10-CM

## 2022-04-20 DIAGNOSIS — M25.611 DECREASED RANGE OF MOTION OF RIGHT SHOULDER: ICD-10-CM

## 2022-04-20 PROCEDURE — 97161 PT EVAL LOW COMPLEX 20 MIN: CPT | Mod: PN

## 2022-04-20 PROCEDURE — 97110 THERAPEUTIC EXERCISES: CPT | Mod: PN

## 2022-04-20 NOTE — PLAN OF CARE
"[facility]   Physical Therapy Initial Evaluation     Date: 4/20/2022   Name: Sharita Gonzalez  Clinic Number: [id number]    Therapy Diagnosis:   Encounter Diagnoses   Name Primary?    Muscle weakness of upper extremity     Decreased range of motion of right shoulder     Postural imbalance      Physician: Amador, Provider Juan Antonio Denton MD    Physician Orders: PT Eval and Treat - both shoulders  Medical Diagnosis from Referral: RCT (rotator cuff tear)  Evaluation Date: 4/20/2022  Authorization Period Expiration: 5/5/22  Plan of Care Expiration: 7/20/22  Progress Note Due: 5/20/221  Visit # / Visits authorized: 1/ 1   FOTO: 1/3    Precautions: Myasthenia gravis, HTN, R RTC repair 6-7 years ago  *may have to unexpectedly change appts due to wife's medical condition requiring transfusions every 7-10 days    Time In: 1300  Time Out: 1345  Total Appointment Time (timed & untimed codes): 45 minutes    SUBJECTIVE     Date of onset: insidious about 2 months ago    History of current condition - Sharita reports: he had R rotator cuff repair about 6-7 years ago. He was treated with physical therapy twice with no return of full shoulder ROM. About 2 months ago, he started to get L shoulder pain. Pt is R hand dominant and sleeps on sides/back. R shoulder does not bother him but he has limited AROM. L shoulder hurts with UE use and particularly lifting plates or 1 pound. No radicular symptoms into B UEs. 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 1 month ago he got injections into B shoulders with little improvements.     Falls: none    Imaging: pt reports last month he had B shoulder x-rays that revealed "rotator cuff tears"    7/11/17 R shoulder MRI: "Status post biceps tenodesis. No evidence for biceps muscle injury. Distal insertion not visualized on MRI of humerus."  11/15/17 R shoulder x-ray: "Complete tear of the supraspinatus tendon.  High-grade tear of the anterior and mid fibers of the " "infraspinatus.  Prior biceps tenodesis."  6/1/16 R shoulder MRI: "1.  No evidence of osseous lesions or marrow placement process.  The abnormal lucency seen on recent right shoulder radiograph corresponds with postoperative tenodesis site.    2.  Full thickness tear of the anterior fibers of the supraspinatus tendon as above.  Mild interstitial tear involving the insertional fibers of the subscapularis tendon.  Mild rotator cuff muscle atrophy.  3.  Os acromiale.  4.  Moderate AC joint arthrosis with osseous edema.  5.  Subacromial/subdeltoid bursitis.  6.  Global labral degeneration with probable superior tear."  4/10/17 L shoulder x-ray: "Internal and external rotation views of the LEFT shoulder plus transscapular Y view reveal no fracture, dislocation, radiopaque retained foreign body, lytic or blastic lesion or extraosseous calcification.    There is mild degenerative change similar to 5/2016, more apparent at the acromioclavicular joint.  I detect no significant abnormality in the imaged portion of the chest."  6/26/17 R shoulder x-ray: "Acromioclavicular osteoarthritis and postsurgical changes to the humeral neck. Otherwise normal evaluation of the right shoulder and humerus."    Prior Therapy: PT in the past with little improvements  Social History: lives with wife  Physical activity: none  Occupation: not working  Prior Level of Function: independent with all ADLs, currently driving  Current Level of Function: difficulty with fishing, reaching overhead, lifting, household chores, dressing, bathing, doing hair    Pain:  Current 3/10, worst 6/10, best 0/10   Location:L shoulder  Description: Aching  Aggravating Factors: Lifting and reaching  Easing Factors: rest    Patients goals: to increase B shoulder AROM and strength, to be able to fish     Medical History:   Past Medical History:   Diagnosis Date    Carpal tunnel syndrome     Hypertension     Polymyalgia     Prostate cancer      Surgical History: "   Sharita Gonzalez  has a past surgical history that includes Hemorrhoid surgery; Shoulder surgery (Right); Finger surgery (9-17-13); Cataract extraction; Brain surgery (age 27); radiation seeds  (2008); Eye surgery; and Carpal tunnel release (Right, 7/9/2018).    Medications:   Sharita has a current medication list which includes the following prescription(s): aspirin, gabapentin, multivitamin, prednisone, prednisone, trandolapril, venlafaxine, and verapamil.    Allergies:   Review of patient's allergies indicates:   Allergen Reactions    Statins-hmg-coa reductase inhibitors      Back ache with Lipitor     Adhesive Rash     Pulls skin off    May use paper tape    Latex, natural rubber Rash      OBJECTIVE     Observation: pleasant and cooperative    Posture: forward head, rounded shoulder, R shoulder lower, increased thoracic kyphosis    Cervical Range of Motion:    Degrees   Flexion WNL   Extension WNL   Right Rotation WNL   Left Rotation WNL   Right Side Bending 20   Left Side Bending 40     Passive Range of Motion (degrees):   Shoulder Right Left   Flexion 165 170   Abduction 170 170   ER at 45 80 80   ER at 0 80 80   IR at 45 70 70      Active Range of Motion (degrees):   Shoulder Right Left   Flexion 45 150   Abduction 50 150   ER at 0 35 60     Upper Extremity Strength  (R) UE  (L) UE    Elbow flexion: 4/5 Elbow flexion: 5/5   Elbow extension: 4-/5 Elbow extension: 4/5 pain   Shoulder flexion: 2/5 Shoulder flexion: 3+/5 pain   Shoulder Abduction: 2/5 Shoulder abduction: 3+/5 pain   Shoulder ER 3/5 Shoulder ER 3-/5 pain   Shoulder IR 4+/5 Shoulder IR 4+/5     Special Tests:    Rotator Cuff:     Right Left   Drop Arm Test - -   ER Lag Sign + -   IR Lag Sign - -   Empty Can Test + for weakness + for pain and weakness     AC Joint/Biceps:     Right Left   Speed's test - + for weakness     Palpation: B deltoid and supraspinatus/infraspinatus atrophy (L>R), B pec atrophy (R>L)    Flexibility: mild B pec  tightness    Scapular Control/Dyskinesis:    Normal / Subtle / Obvious  Comments    Left  obvious Scapular dyskinesia    Right  obvious Scapular dyskinesia     Limitation/Restriction for FOTO shoulder Survey    Therapist reviewed FOTO scores for Sharita Gonzalez on 4/20/2022.   FOTO documents entered into [employer] - see Media section.    Limitation Score: 46%     TREATMENT     Total Treatment time (time-based codes) separate from Evaluation: 15 minutes      Sharita received the treatments listed below:      Therapeutic exercises to develop strength, endurance, ROM, flexibility, posture and core stabilization for 15 minutes including:    Supine sh flex w dowel x 20  Wall slides w RTB x 20  Posture correction demo  Scapular retraction demo    PATIENT EDUCATION AND HOME EXERCISES     Education provided:   - importance of performing HEP to tolerance  - rotator cuff pathology    Written Home Exercises Provided: yes. Pt given red theraband for home use. Exercises were reviewed and Sharita was able to demonstrate them prior to the end of the session.  Sharita demonstrated good  understanding of the education provided. See EMR under Patient Instructions for exercises provided during therapy sessions.    ASSESSMENT     Sharita is a 76 y.o. male referred to outpatient Physical Therapy with a medical diagnosis of RCT (rotator cuff tear). Patient presents s/p R RTC repair 6-7 years ago with L shoulder pain, decreased B shoulder AROM, UE weakness, postural imbalance, muscle tightness, and decreased functional use of UE. Special tests indicate B shoulder/rotator cuff pathology. Good response to exercise program. HEP and theraband provided.     Patient prognosis is Fair.   Patient will benefit from skilled outpatient Physical Therapy to address the deficits stated above and in the chart below, provide patient /family education, and to maximize patientt's level of independence.     Plan of care discussed with patient: Yes  Patient's spiritual,  cultural and educational needs considered and patient is agreeable to the plan of care and goals as stated below:     Anticipated Barriers for therapy: Myasthenia gravis, chronicity of condition    Medical Necessity is demonstrated by the following  History  Co-morbidities and personal factors that may impact the plan of care Co-morbidities:   history of cancer and history of R RTC repair, myasthenia gravis    Personal Factors:   lifestyle     high   Examination  Body Structures and Functions, activity limitations and participation restrictions that may impact the plan of care Body Regions:   upper extremities  trunk    Body Systems:    gross symmetry  ROM  strength  gross coordinated movement  transfers  transitions  motor control  motor learning    Participation Restrictions:   ADLs, IADLs, domestic duties    Activity limitations:   Learning and applying knowledge  no deficits    General Tasks and Commands  no deficits    Communication  no deficits    Mobility  lifting and carrying objects  fine hand use (grasping/picking up)    Self care  washing oneself (bathing, drying, washing hands)  dressing    Domestic Life  shopping  cooking  doing house work (cleaning house, washing dishes, laundry)  assisting others    Interactions/Relationships  no deficits    Life Areas  no deficits    Community and Social Life  community life  recreation and leisure         high   Clinical Presentation stable and uncomplicated low   Decision Making/ Complexity Score: low     Medical necessity is demonstrated by the following IMPAIRMENTS/PROBLEM LIST:   1) Increase in pain level limiting function   2) UE weakness   3) Decreased shoulder AROM   4) Difficulty reaching overhead   5) Lack of HEP    GOALS: Short Term Goals:  6 weeks in progress  1. Report decreased L shoulder pain  <  / =  5/10 at worst to increase tolerance for doing hair.   2. Pt will be able to tolerate multi-directional UE strengthening without pain to improve  functional use of UEs.  3. Pt will increase R shoulder flexion AROM to 90 degrees to indicate improved ability to perform dress self.   4. Pt will report 50% improvement in ability to reach overhead since start of care to indicate improved functional independence.   5. Pt to tolerate HEP to improve ROM and independence with ADL's.    Long Term Goals: 12 weeks in progress  1. Report decreased L shoulder pain  <  / =  3/10 at worst to increase tolerance for doing hair.   2. Pt will be able to tolerate multi-directional UE strengthening without pain to improve functional use of UEs.  3. Pt will increase R shoulder flexion AROM to 120 degrees to indicate improved ability to perform dress self.   4. Pt will report 80% improvement in ability to reach overhead since start of care to indicate improved functional independence.   5. Pt to be Independent with HEP to improve ROM and independence with ADL's.    PLAN   Plan of care Certification: 4/20/2022 to 7/20/22.    Outpatient Physical Therapy 2 times weekly for 12 weeks to include the following interventions: Electrical Stimulation NMES, Manual Therapy, Moist Heat/ Ice, Neuromuscular Re-ed, Patient Education, Therapeutic Activities and Therapeutic Exercise.     Yolanda Marie, PT      I CERTIFY THE NEED FOR THESE SERVICES FURNISHED UNDER THIS PLAN OF TREATMENT AND WHILE UNDER MY CARE   Physician's comments:     Physician's Signature: ___________________________________________________

## 2022-05-06 ENCOUNTER — CLINICAL SUPPORT (OUTPATIENT)
Dept: REHABILITATION | Facility: HOSPITAL | Age: 77
End: 2022-05-06
Payer: MEDICARE

## 2022-05-06 DIAGNOSIS — R29.3 POSTURAL IMBALANCE: ICD-10-CM

## 2022-05-06 DIAGNOSIS — M25.611 DECREASED RANGE OF MOTION OF RIGHT SHOULDER: ICD-10-CM

## 2022-05-06 DIAGNOSIS — M62.81 MUSCLE WEAKNESS OF UPPER EXTREMITY: Primary | ICD-10-CM

## 2022-05-06 PROCEDURE — 97110 THERAPEUTIC EXERCISES: CPT | Mod: PN

## 2022-05-06 NOTE — PROGRESS NOTES
[facility]   Physical Therapy Treatment Note     Name: Sharita Gonzalez  Clinic Number: [id number]    Therapy Diagnosis: No diagnosis found.  Physician: Kari English    Visit Date: 5/6/2022    Physician: Kari English MD     Physician Orders: PT Eval and Treat - both shoulders  Medical Diagnosis from Referral: RCT (rotator cuff tear)  Evaluation Date: 4/20/2022  Authorization Period Expiration: 5/5/22  Plan of Care Expiration: 7/20/22  Progress Note Due: 5/20/221  Visit # / Visits authorized: 2/25  FOTO: 1/3    PTA Visit #: 0/5     Precautions: Myasthenia gravis, HTN, R RTC repair 6-7 years ago  *may have to unexpectedly change appts due to wife's medical condition requiring transfusions every 7-10 days      Time In: 8:00 am  Time Out: 9:00 am  Total Billable Time: 50 minutes      SUBJECTIVE     Pt reports: his pain is in his L shoulder and his R shoulder is just weak since his RCR years ago. Wall slides were painful so he was unable to perform them during HEP. Has most trouble lifting L arm overhead because of pain.  He was compliant with home exercise program.  Response to previous treatment: eval  Functional change: ongoing    Pain: 3/10  Location: left shoulder (0/10 R shoulder, weakness)       OBJECTIVE         From Eval  Passive Range of Motion (degrees):   Shoulder Right Left   Flexion 165 170   Abduction 170 170   ER at 45 80 80   ER at 0 80 80   IR at 45 70 70      Active Range of Motion (degrees):   Shoulder Right Left   Flexion 45 150   Abduction 50 150   ER at 0 35 60          TREATMENT     Sharita received therapeutic exercises to develop strength, endurance and ROM for 50 minutes including:  Supine sh flex w dowel x 20 3#  Supine chest press x 20 5#   Supine horizontal abduction x 20 RTB  Sidelying L shoulder ER- attempted with 1# but held due to pain  Supine T band ER 10x5 sec RTB   T band rows and ext 30x5 sec GTB   T band ER step outs 20x5 sec ea  YTB   Pulleys 2 min   Push Press with 1# L UE 1x5  Push Press 5# B UE 2x1  Matrix rows 3x10 25#   Wall slides w RTB x 20- held        Ice x 8 min L shoulder at end of session.         PATIENT EDUCATION AND HOME EXERCISES     Home Exercises Provided and Patient Education Provided     Education provided:   - continue HEP    Written Home Exercises Provided: yes. Exercises were reviewed and Sharita was able to demonstrate them prior to the end of the session.  Sharita demonstrated good  understanding of the education provided. See EMR under Patient Instructions for exercises provided during therapy sessions    ASSESSMENT     The patient presents for first follow up. He reports L shoulder pain and no pain in R but limited ROM on R side. Session focused on strengthening bilateral shoulders. Unable to tolerated sidelying L shoulder ER or wall slides due to pain so these were held. Difficulty with T band iso ER step outs on L due to weakness. We addressed performing a push press (dip and drive with legs) to aid in placing objects into cabinet. Good tolerance to exercises given today. Benefits from continued skilled PT to address ongoing difficulties with pain, ROM, and strength to allow for return to PLOF.       Sharita Is progressing well towards his goals.   Pt prognosis is Good.     Pt will continue to benefit from skilled outpatient physical therapy to address the deficits listed in the problem list box on initial evaluation, provide pt/family education and to maximize pt's level of independence in the home and community environment.     Pt's spiritual, cultural and educational needs considered and pt agreeable to plan of care and goals.     Anticipated Barriers for therapy: Myasthenia gravis, chronicity of condition     GOALS: Short Term Goals:  6 weeks in progress  1. Report decreased L shoulder pain  <  / =  5/10 at worst to increase tolerance for doing hair.   2. Pt will be able to tolerate multi-directional UE strengthening  without pain to improve functional use of UEs.  3. Pt will increase R shoulder flexion AROM to 90 degrees to indicate improved ability to perform dress self.   4. Pt will report 50% improvement in ability to reach overhead since start of care to indicate improved functional independence.   5. Pt to tolerate HEP to improve ROM and independence with ADL's.     Long Term Goals: 12 weeks in progress  1. Report decreased L shoulder pain  <  / =  3/10 at worst to increase tolerance for doing hair.   2. Pt will be able to tolerate multi-directional UE strengthening without pain to improve functional use of UEs.  3. Pt will increase R shoulder flexion AROM to 120 degrees to indicate improved ability to perform dress self.   4. Pt will report 80% improvement in ability to reach overhead since start of care to indicate improved functional independence.   5. Pt to be Independent with HEP to improve ROM and independence with ADL's.      PLAN     Continue with skilled PT towards POC.       RICARDO REDMOND, PT

## 2022-05-09 NOTE — PROGRESS NOTES
[facility]   Physical Therapy Treatment Note     Name: Sharita Gonzalez  Clinic Number: [id number]    Therapy Diagnosis:   Encounter Diagnoses   Name Primary?    Muscle weakness of upper extremity Yes    Decreased range of motion of right shoulder     Postural imbalance      Physician: Kari English    Visit Date: 5/10/2022    Physician: [facility] Provider Juan Antonio Denton MD     Physician Orders: PT Eval and Treat - both shoulders  Medical Diagnosis from Referral: RCT (rotator cuff tear)  Evaluation Date: 4/20/2022  Authorization Period Expiration: 5/5/22  Plan of Care Expiration: 7/20/22  Progress Note Due: 5/20/221  Visit # / Visits authorized: 3/25  FOTO: 1/3    PTA Visit #: 0/5     Precautions: Myasthenia gravis, HTN, R RTC repair 6-7 years ago  *may have to unexpectedly change appts due to wife's medical condition requiring transfusions every 7-10 days    Time In: 1513  Time Out: 1608  Total Billable Time: 45 minutes    SUBJECTIVE     Pt reports: he did some work in his yard with no significant difficulty. He still has difficulty reaching overhead and scrubbing his back.   He was compliant with home exercise program.  Response to previous treatment: good  Functional change: none to note    Pain: 0/10  Location: left shoulder (0/10 R shoulder, weakness)       OBJECTIVE       TREATMENT     Sharita received therapeutic exercises to develop strength, endurance and ROM for 45 minutes including:    Supine sh flex w dowel x 20 3#  Supine chest press x 20 5#   Supine horizontal abduction x 20 RTB  Sidelying L shoulder ER- attempted with 1# but held due to pain  Supine B sh ER 20x5 sec RTB   Rows w RTB x 20   B sh ext w RTB x 20  T band ER step outs 20x5 sec ea YTB   Pulleys flex x 3 min   Push Press with 1# L UE 1x5  Push Press 5# B UE 2x1  Matrix rows 3x10 25#   Wall slides w RTB x 20  +Seated R sh flex AROM w elbow on mat and post RTB pull x 20  +Seated sh IR w elbow on mat and RTB x  20 ea  +Wall push ups x 20  +Seated lift off x 20 ea    Ice x 10 min L shoulder at end of session.     PATIENT EDUCATION AND HOME EXERCISES     Home Exercises Provided and Patient Education Provided     Education provided:   - continue HEP    Written Home Exercises Provided: yes. Exercises were reviewed and Sharita was able to demonstrate them prior to the end of the session.  Sharita demonstrated good  understanding of the education provided. See EMR under Patient Instructions for exercises provided during therapy sessions    ASSESSMENT     Sharita had good tolerance to treatment today with no adverse effects. Post-treatment B shoulder pain rated as 0/10. Pt with good response to progression of exercise program. He corrected on how to perform wall slides properly without excessive tension in band. Able to perform with no pain today. Pt with improvement in shoulder flexion AROM with use of theraband for rotator cuff activation and serratus anterior. He continues with significant R shoulder weakness with poor scapulohumeral rhythm. Pt with better AROM on L. Benefits from continued skilled PT to address ongoing difficulties with pain, ROM, and strength to allow for return to PLOF.     Sharita Is progressing well towards his goals.   Pt prognosis is Good.     Pt will continue to benefit from skilled outpatient physical therapy to address the deficits listed in the problem list box on initial evaluation, provide pt/family education and to maximize pt's level of independence in the home and community environment.     Pt's spiritual, cultural and educational needs considered and pt agreeable to plan of care and goals.     Anticipated Barriers for therapy: Myasthenia gravis, chronicity of condition     GOALS: Short Term Goals:  6 weeks in progress  1. Report decreased L shoulder pain  <  / =  5/10 at worst to increase tolerance for doing hair.   2. Pt will be able to tolerate multi-directional UE strengthening without pain to improve  functional use of UEs.  3. Pt will increase R shoulder flexion AROM to 90 degrees to indicate improved ability to perform dress self.   4. Pt will report 50% improvement in ability to reach overhead since start of care to indicate improved functional independence.   5. Pt to tolerate HEP to improve ROM and independence with ADL's.     Long Term Goals: 12 weeks in progress  1. Report decreased L shoulder pain  <  / =  3/10 at worst to increase tolerance for doing hair.   2. Pt will be able to tolerate multi-directional UE strengthening without pain to improve functional use of UEs.  3. Pt will increase R shoulder flexion AROM to 120 degrees to indicate improved ability to perform dress self.   4. Pt will report 80% improvement in ability to reach overhead since start of care to indicate improved functional independence.   5. Pt to be Independent with HEP to improve ROM and independence with ADL's.    PLAN     Continue with skilled PT towards POC.     Yolanda Marie, PT

## 2022-05-10 ENCOUNTER — CLINICAL SUPPORT (OUTPATIENT)
Dept: REHABILITATION | Facility: HOSPITAL | Age: 77
End: 2022-05-10
Payer: MEDICARE

## 2022-05-10 DIAGNOSIS — M25.611 DECREASED RANGE OF MOTION OF RIGHT SHOULDER: ICD-10-CM

## 2022-05-10 DIAGNOSIS — M62.81 MUSCLE WEAKNESS OF UPPER EXTREMITY: Primary | ICD-10-CM

## 2022-05-10 DIAGNOSIS — R29.3 POSTURAL IMBALANCE: ICD-10-CM

## 2022-05-10 PROCEDURE — 97110 THERAPEUTIC EXERCISES: CPT | Mod: PN

## 2022-05-13 ENCOUNTER — CLINICAL SUPPORT (OUTPATIENT)
Dept: REHABILITATION | Facility: HOSPITAL | Age: 77
End: 2022-05-13
Payer: MEDICARE

## 2022-05-13 DIAGNOSIS — M25.611 DECREASED RANGE OF MOTION OF RIGHT SHOULDER: ICD-10-CM

## 2022-05-13 DIAGNOSIS — M62.81 MUSCLE WEAKNESS OF UPPER EXTREMITY: Primary | ICD-10-CM

## 2022-05-13 DIAGNOSIS — R29.3 POSTURAL IMBALANCE: ICD-10-CM

## 2022-05-13 PROCEDURE — 97110 THERAPEUTIC EXERCISES: CPT | Mod: PN

## 2022-05-13 NOTE — PROGRESS NOTES
[facility]   Physical Therapy Treatment Note     Name: Sharita Gonzalez  Clinic Number: [id number]    Therapy Diagnosis:   Encounter Diagnoses   Name Primary?    Muscle weakness of upper extremity Yes    Decreased range of motion of right shoulder     Postural imbalance      Physician: Kari English    Visit Date: 5/13/2022    Physician: [facility] Provider Juan Antonio Denton MD     Physician Orders: PT Eval and Treat - both shoulders  Medical Diagnosis from Referral: RCT (rotator cuff tear)  Evaluation Date: 4/20/2022  Authorization Period Expiration: 5/5/22  Plan of Care Expiration: 7/20/22  Progress Note Due: 5/20/221  Visit # / Visits authorized: 4/25  FOTO: 1/3    PTA Visit #: 0/5     Precautions: Myasthenia gravis, HTN, R RTC repair 6-7 years ago  *may have to unexpectedly change appts due to wife's medical condition requiring transfusions every 7-10 days    Time In: 10:00 am  Time Out: 11:00 am  Total Billable Time: 58 minutes    SUBJECTIVE     Pt reports: continues to have weakness in R shoulder. Mild pain L shoulder today.   He was compliant with home exercise program.  Response to previous treatment: good  Functional change: none to note    Pain: 2/10  Location: left shoulder (0/10 R shoulder, weakness)       OBJECTIVE       TREATMENT     Sharita received therapeutic exercises to develop strength, endurance and ROM for 58 minutes including:  Pulleys flex x 3 min   Supine sh flex w dowel x 20 3#  Supine horizontal abduction x 20 RTB  Supine B sh ER 20x5 sec RTB   Rows w RTB x 20   B sh ext w GTB 30x 5 sec   Wall slides w RTB x 20  Seated R sh flex AROM w elbow on mat and post RTB pull x 20  Seated sh IR and+ ER (modified ROM)  w elbow on mat and RTB x 20 ea  Wall push ups x 20  Seated lift off x 20 0# R, 1# L (cue to limit thoracic extension)   +prone rows 3x10 3# R, 5# L  +Unilateral Supine press with dumbbell 3x10 3# ea side    Ice x 10 min L shoulder at end of session.      PATIENT EDUCATION AND HOME EXERCISES     Home Exercises Provided and Patient Education Provided     Education provided:   - continue HEP    Written Home Exercises Provided: yes. Exercises were reviewed and Sharita was able to demonstrate them prior to the end of the session.  Sharita demonstrated good  understanding of the education provided. See EMR under Patient Instructions for exercises provided during therapy sessions    ASSESSMENT     Continues to progress exercises from last visit. Pt able to flexion R arm overhead with posterior band pull performed. Difficulty controlling supine press with 5# on L side but improved control with weight reduction to 3#. Continues to remain painful with ER and abduction movements with L shoulder and weaknes with lift off on R. Benefits from continued skilled PT to address ongoing difficulties with pain, ROM, and strength to allow for return to PLOF. Pt 1:1 with PT during entire session.      Sharita Is progressing well towards his goals.   Pt prognosis is Good.     Pt will continue to benefit from skilled outpatient physical therapy to address the deficits listed in the problem list box on initial evaluation, provide pt/family education and to maximize pt's level of independence in the home and community environment.     Pt's spiritual, cultural and educational needs considered and pt agreeable to plan of care and goals.     Anticipated Barriers for therapy: Myasthenia gravis, chronicity of condition     GOALS: Short Term Goals:  6 weeks in progress  1. Report decreased L shoulder pain  <  / =  5/10 at worst to increase tolerance for doing hair.   2. Pt will be able to tolerate multi-directional UE strengthening without pain to improve functional use of UEs.  3. Pt will increase R shoulder flexion AROM to 90 degrees to indicate improved ability to perform dress self.   4. Pt will report 50% improvement in ability to reach overhead since start of care to indicate improved functional  independence.   5. Pt to tolerate HEP to improve ROM and independence with ADL's.     Long Term Goals: 12 weeks in progress  1. Report decreased L shoulder pain  <  / =  3/10 at worst to increase tolerance for doing hair.   2. Pt will be able to tolerate multi-directional UE strengthening without pain to improve functional use of UEs.  3. Pt will increase R shoulder flexion AROM to 120 degrees to indicate improved ability to perform dress self.   4. Pt will report 80% improvement in ability to reach overhead since start of care to indicate improved functional independence.   5. Pt to be Independent with HEP to improve ROM and independence with ADL's.    PLAN     Continue with skilled PT towards POC.     RICARDO REDMOND, PT

## 2022-05-16 NOTE — PROGRESS NOTES
[facility]   Physical Therapy Treatment Note     Name: Sharita Goznalez  Clinic Number: [id number]    Therapy Diagnosis:   Encounter Diagnoses   Name Primary?    Muscle weakness of upper extremity Yes    Decreased range of motion of right shoulder     Postural imbalance      Physician: Kari English    Visit Date: 5/17/2022    Physician: Amador, Provider- Juan Antonio Denton MD     Physician Orders: PT Eval and Treat - both shoulders  Medical Diagnosis from Referral: RCT (rotator cuff tear)  Evaluation Date: 4/20/2022  Authorization Period Expiration: 5/5/22  Plan of Care Expiration: 7/20/22  Progress Note Due: 5/20/221  Visit # / Visits authorized: 5/25  FOTO: 1/3    PTA Visit #: 0/5     Precautions: Myasthenia gravis, HTN, R RTC repair 6-7 years ago  *may have to unexpectedly change appts due to wife's medical condition requiring transfusions every 7-10 days    Time In: 1516  Time Out: 1607  Total Billable Time: 41 minutes    SUBJECTIVE     Pt reports: Saturday he was doing pulleys at home for about 5 minutes and yard work. He felt good while doing it and that night but he woke up in the morning with significant soreness in R shoulder. No numbness or tingling in R UE. L shoulder has been doing good with not much pain lately.   He was compliant with home exercise program.  Response to previous treatment: good  Functional change: less L shoulder pain with daily activities    Pain: 0/10 and 4-5/10  Location: left shoulder and R shoulder    OBJECTIVE     Taken 5/17/22:  R shoulder ER MMT: 3/5 with pain  R ER lag: positive  Tenderness to posterior aspect of R acromion    TREATMENT     Sharita received therapeutic exercises to develop strength, endurance and ROM for 41 minutes including:    Pulleys flex x 3 min   +Isometric R sh ER 5 sec hold x 20  +Seated B sh ER AROM x 20 ea  +Supine pull down w RTB x 20 ea  Supine sh flex w 3# dowel x 20   Supine horizontal abduction x 20 RTB  Supine B  sh ER 20x5 sec RTB   Rows w RTB x 20   B sh ext w GTB 30x 5 sec   Wall slides w RTB x 20  Seated R sh flex AROM w elbow on mat and post RTB pull x 20  Seated sh IR w elbow on mat and RTB x 20 ea  Mat push ups x 20  Seated lift off x 20 0# R, 1# L (cue to limit thoracic extension)   Prone rows 3x10 3# R, 5# L  Unilateral Supine press with dumbbell 3x10 3# ea side    Ice x 10 min L shoulder at end of session.     PATIENT EDUCATION AND HOME EXERCISES     Home Exercises Provided and Patient Education Provided     Education provided:   - continue HEP    Written Home Exercises Provided: Patient instructed to cont prior HEP. Exercises were reviewed and Sharita was able to demonstrate them prior to the end of the session.  Sharita demonstrated good  understanding of the education provided. See EMR under Patient Instructions for exercises provided during therapy sessions    ASSESSMENT     Sharita had good tolerance to treatment today with no adverse effects. Post-treatment B shoulders pain rated as 0/10. Pt presents to clinic with increased R shoulder pain and decreased shoulder AROM. Good response to progression of exercise program. He was challenged with R shoulder ER strengthening but tolerable. Full B shoulder PROM noted with therapeutic exercises. Will continue to progress UE and postural strengthening to tolerance.     Sharita Is progressing well towards his goals.   Pt prognosis is Good.     Pt will continue to benefit from skilled outpatient physical therapy to address the deficits listed in the problem list box on initial evaluation, provide pt/family education and to maximize pt's level of independence in the home and community environment.     Pt's spiritual, cultural and educational needs considered and pt agreeable to plan of care and goals.     Anticipated Barriers for therapy: Myasthenia gravis, chronicity of condition     GOALS: Short Term Goals:  6 weeks in progress  1. Report decreased L shoulder pain  <  / =  5/10 at  worst to increase tolerance for doing hair.   2. Pt will be able to tolerate multi-directional UE strengthening without pain to improve functional use of UEs.  3. Pt will increase R shoulder flexion AROM to 90 degrees to indicate improved ability to perform dress self.   4. Pt will report 50% improvement in ability to reach overhead since start of care to indicate improved functional independence.   5. Pt to tolerate HEP to improve ROM and independence with ADL's.     Long Term Goals: 12 weeks in progress  1. Report decreased L shoulder pain  <  / =  3/10 at worst to increase tolerance for doing hair.   2. Pt will be able to tolerate multi-directional UE strengthening without pain to improve functional use of UEs.  3. Pt will increase R shoulder flexion AROM to 120 degrees to indicate improved ability to perform dress self.   4. Pt will report 80% improvement in ability to reach overhead since start of care to indicate improved functional independence.   5. Pt to be Independent with HEP to improve ROM and independence with ADL's.    PLAN     Plan of care Certification: 4/20/2022 to 7/20/22.     Outpatient Physical Therapy 2 times weekly for 12 weeks to include the following interventions: Electrical Stimulation NMES, Manual Therapy, Moist Heat/ Ice, Neuromuscular Re-ed, Patient Education, Therapeutic Activities and Therapeutic Exercise.     Continue with skilled PT towards POC. Progress UE strength.     Yolanda Marie, PT

## 2022-05-17 ENCOUNTER — CLINICAL SUPPORT (OUTPATIENT)
Dept: REHABILITATION | Facility: HOSPITAL | Age: 77
End: 2022-05-17
Payer: MEDICARE

## 2022-05-17 DIAGNOSIS — M62.81 MUSCLE WEAKNESS OF UPPER EXTREMITY: Primary | ICD-10-CM

## 2022-05-17 DIAGNOSIS — M25.611 DECREASED RANGE OF MOTION OF RIGHT SHOULDER: ICD-10-CM

## 2022-05-17 DIAGNOSIS — R29.3 POSTURAL IMBALANCE: ICD-10-CM

## 2022-05-17 PROCEDURE — 97110 THERAPEUTIC EXERCISES: CPT | Mod: PN

## 2022-05-19 NOTE — PROGRESS NOTES
[facility]   Physical Therapy Treatment Note     Name: Sharita Gonzalez  Clinic Number: [id number]    Therapy Diagnosis:   Encounter Diagnoses   Name Primary?    Muscle weakness of upper extremity Yes    Decreased range of motion of right shoulder     Postural imbalance      Physician: Kari English    Visit Date: 5/20/2022    Physician: Amador, Provider- Juan Antonio Denton MD     Physician Orders: PT Eval and Treat - both shoulders  Medical Diagnosis from Referral: RCT (rotator cuff tear)  Evaluation Date: 4/20/2022  Authorization Period Expiration: 5/5/22  Plan of Care Expiration: 7/20/22  Progress Note Due: 6/20/22  Visit # / Visits authorized: 6/25  FOTO: 1/3    PTA Visit #: 0/5     Precautions: Myasthenia gravis, HTN, R RTC repair 6-7 years ago  *may have to unexpectedly change appts due to wife's medical condition requiring transfusions every 7-10 days    Time In: 0747  Time Out: 0842  Total Billable Time: 55 minutes    SUBJECTIVE     Pt reports: he still has sore spot on posterior R shoulder. L shoulder has not been hurting but will be sore occasionally. R shoulder pain is at worst 7/10. 30% improvement in ability to reach overhead since start of care. Continues to struggle with lifting 2 saucers overhead but is capable of lifting 1.   He was compliant with home exercise program.  Response to previous treatment: good with soreness  Functional change: less L shoulder pain with daily activities    Pain: 0/10 and 5/10  Location: left shoulder and R shoulder    OBJECTIVE     Taken 5/17/22:  R shoulder ER MMT: 3/5 with pain  R ER lag: positive  Tenderness to posterior aspect of R acromion    TREATMENT     Sharita received therapeutic exercises to develop strength, endurance and ROM for 55 minutes including:    +UBE 3'F/3'B  Pulleys flex x 3 min    Isometric R sh ER 5 sec hold x 20  +Isometric R sh abd 5 sec hold x 20  Seated B sh ER AROM x 20 ea  +Supine sh abd then return down into  ext x 20  Supine pull down w RTB x 20 ea  +Supine L sh flex w RTB x 20  Supine sh flex w 1# dowel x 20   Supine horizontal abduction x 20 RTB  Supine B sh ER 20x5 sec RTB   +Supine ABCs 2# x 2 ea  Rows w RTB x 20   B sh ext w GTB 30x 5 sec   Wall slides w RTB 4 x 5  Seated R sh flex AROM w elbow on mat and post RTB pull x 20  Seated sh IR w elbow on mat and RTB x 20 ea  Mat push ups x 20  Seated lift off x 20 0# R, 1# L (cue to limit thoracic extension)   Prone rows 3x10 3# R, 5# L  Unilateral Supine press with dumbbell 3x10 3# ea side    Ice x 0 min L shoulder at end of session.     PATIENT EDUCATION AND HOME EXERCISES     Home Exercises Provided and Patient Education Provided     Education provided:   - continue HEP    Written Home Exercises Provided: Patient instructed to cont prior HEP. Exercises were reviewed and Sharita was able to demonstrate them prior to the end of the session.  Sharita demonstrated good  understanding of the education provided. See EMR under Patient Instructions for exercises provided during therapy sessions    ASSESSMENT     Sharita was re-assessed today with 3/5 STGs being met indicating improvements in L shoulder pain and tolerance for UE strengthening and HEP since start of care. Pt with recent exacerbation of R shoulder pain. He continues with UE weakness, postural imbalance, decreased shoulder AROM, and decreased functional use of UEs. Pt could benefit from continued physical therapy services to address deficits.     He had good tolerance to treatment today with no adverse effects. Post-treatment L shoulder pain rated as 0/10 and R shoulder as 2/10. He continues with R shoulder pain and UE weakness. Pt greatly challenged with R shoulder flexion against gravity versus supine. Good response to progression of exercise program. He was challenged with R shoulder stabilization. Will continue to progress UE and postural strengthening to tolerance.     Sharita Is progressing well towards his goals.   Pt  prognosis is Good.     Pt will continue to benefit from skilled outpatient physical therapy to address the deficits listed in the problem list box on initial evaluation, provide pt/family education and to maximize pt's level of independence in the home and community environment.     Pt's spiritual, cultural and educational needs considered and pt agreeable to plan of care and goals.     Anticipated Barriers for therapy: Myasthenia gravis, chronicity of condition     GOALS: Short Term Goals:  6 weeks   1. Report decreased L shoulder pain  <  / =  5/10 at worst to increase tolerance for doing hair.- met 5/20/22  2. Pt will be able to tolerate multi-directional UE strengthening without pain to improve functional use of UEs.- met 5/20/22  3. Pt will increase R shoulder flexion AROM to 90 degrees to indicate improved ability to perform dress self.- in progress   4. Pt will report 50% improvement in ability to reach overhead since start of care to indicate improved functional independence.- in progress   5. Pt to tolerate HEP to improve ROM and independence with ADL's.- met 5/20/22     Long Term Goals: 12 weeks in progress  1. Report decreased L shoulder pain  <  / =  3/10 at worst to increase tolerance for doing hair.   2. Pt will be able to tolerate multi-directional UE strengthening without pain to improve functional use of UEs.  3. Pt will increase R shoulder flexion AROM to 120 degrees to indicate improved ability to perform dress self.   4. Pt will report 80% improvement in ability to reach overhead since start of care to indicate improved functional independence.   5. Pt to be Independent with HEP to improve ROM and independence with ADL's.    PLAN     Plan of care Certification: 4/20/2022 to 7/20/22.     Outpatient Physical Therapy 2 times weekly for 12 weeks to include the following interventions: Electrical Stimulation NMES, Manual Therapy, Moist Heat/ Ice, Neuromuscular Re-ed, Patient Education, Therapeutic  Activities and Therapeutic Exercise.     Continue with skilled PT towards POC. Progress UE strength.     Yolanda Marie, PT

## 2022-05-20 ENCOUNTER — CLINICAL SUPPORT (OUTPATIENT)
Dept: REHABILITATION | Facility: HOSPITAL | Age: 77
End: 2022-05-20
Payer: MEDICARE

## 2022-05-20 DIAGNOSIS — M25.611 DECREASED RANGE OF MOTION OF RIGHT SHOULDER: ICD-10-CM

## 2022-05-20 DIAGNOSIS — M62.81 MUSCLE WEAKNESS OF UPPER EXTREMITY: Primary | ICD-10-CM

## 2022-05-20 DIAGNOSIS — R29.3 POSTURAL IMBALANCE: ICD-10-CM

## 2022-05-20 PROCEDURE — 97110 THERAPEUTIC EXERCISES: CPT | Mod: PN

## 2022-05-23 NOTE — PROGRESS NOTES
[facility]   Physical Therapy Treatment Note     Name: Sharita Gonzalez  Clinic Number: [id number]    Therapy Diagnosis:   Encounter Diagnoses   Name Primary?    Muscle weakness of upper extremity Yes    Decreased range of motion of right shoulder     Postural imbalance      Physician: Kari English    Visit Date: 5/24/2022    Physician: Amador, Provider- Juan Antonio Denton MD     Physician Orders: PT Eval and Treat - both shoulders  Medical Diagnosis from Referral: RCT (rotator cuff tear)  Evaluation Date: 4/20/2022  Authorization Period Expiration: 5/5/22  Plan of Care Expiration: 7/20/22  Progress Note Due: 6/20/22  Visit # / Visits authorized: 7/25  FOTO: 1/3    PTA Visit #: 0/5     Precautions: Myasthenia gravis, HTN, R RTC repair 6-7 years ago  *may have to unexpectedly change appts due to wife's medical condition requiring transfusions every 7-10 days    Time In: 0835- pt 5 min late to session  Time Out: 0930  Total Billable Time: 55 minutes    SUBJECTIVE     Pt reports: he tried hanging 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 Feeling some tightness in B biceps.   He was compliant with home exercise program.  Response to previous treatment: good with soreness  Functional change: less L shoulder pain with daily activities    Pain: 2/10 and 4-5/10  Location: left shoulder and R shoulder    OBJECTIVE     Taken 5/17/22:  R shoulder ER MMT: 3/5 with pain  R ER lag: positive  Tenderness to posterior aspect of R acromion    TREATMENT     Sharita received therapeutic exercises to develop strength, endurance and ROM for 40 minutes including:    UBE 2'F/2'B  Pulleys flex x 3 min    Isometric R sh ER 5 sec hold x 20  Isometric R sh abd 5 sec hold x 20  +Low biceps stretch on pole 3 x 30 sec ea  Seated B sh ER AROM x 20 ea  +Seated L horiz abd w RTB x 20  +Seated L IR w RTB x 20  +Seated L horiz add w RTB x 20  Supine B sh abd then return down into ext x 20  Supine B pull  down w RTB x 20  Supine L sh flex w RTB x 20  Supine sh flex w 1# dowel x 20   Supine horizontal abduction x 20 RTB  Supine B sh ER 20x5 sec RTB    Supine ABCs 2# x 2 ea  Rows w RTB x 20   B sh ext w GTB 30x 5 sec   Wall slides w RTB 4 x 5  Seated R sh flex AROM w elbow on mat and post RTB pull x 20  Seated sh IR w elbow on mat and RTB x 20 ea  Mat push ups x 20  Seated lift off x 20 0# R, 1# L (cue to limit thoracic extension)   Prone rows 3x10 3# R, 5# L  Unilateral Supine press with dumbbell 3x10 3# ea side    Pt received the following manual therapy techniques for 15 minutes:     Suction silicon cupping to L posterior acromion and supraspinatus/uppeer trap x 5 min ea concurrent with L shoulder strengthening + 5 min at L posterior acromion at end of session    Ice x 0 min L shoulder at end of session.     PATIENT EDUCATION AND HOME EXERCISES     Home Exercises Provided and Patient Education Provided     Education provided:   - continue HEP    Written Home Exercises Provided: Patient instructed to cont prior HEP. Exercises were reviewed and Sharita was able to demonstrate them prior to the end of the session.  Sharita demonstrated good  understanding of the education provided. See EMR under Patient Instructions for exercises provided during therapy sessions    ASSESSMENT     Sharita had good tolerance to treatment today with no adverse effects. Post-treatment L shoulder pain rated as 0/10 and R shoulder as 4/10. Pt presents to clinic with reports of increased pain due to weekend activities. Good response to cupping to R shoulder and progression of exercise program. Updated HEP provided. He continues with significant R UE weakness and difficulty reaching overhead. Frequent verbal cueing required to avoid upper trap compensations. Will continue to progress UE and postural strengthening to tolerance.     Sharita Is progressing well towards his goals.   Pt prognosis is Good.     Pt will continue to benefit from skilled outpatient  physical therapy to address the deficits listed in the problem list box on initial evaluation, provide pt/family education and to maximize pt's level of independence in the home and community environment.     Pt's spiritual, cultural and educational needs considered and pt agreeable to plan of care and goals.     Anticipated Barriers for therapy: Myasthenia gravis, chronicity of condition     GOALS: Short Term Goals:  6 weeks   1. Report decreased L shoulder pain  <  / =  5/10 at worst to increase tolerance for doing hair.- met 5/20/22  2. Pt will be able to tolerate multi-directional UE strengthening without pain to improve functional use of UEs.- met 5/20/22  3. Pt will increase R shoulder flexion AROM to 90 degrees to indicate improved ability to perform dress self.- in progress   4. Pt will report 50% improvement in ability to reach overhead since start of care to indicate improved functional independence.- in progress   5. Pt to tolerate HEP to improve ROM and independence with ADL's.- met 5/20/22     Long Term Goals: 12 weeks in progress  1. Report decreased L shoulder pain  <  / =  3/10 at worst to increase tolerance for doing hair.   2. Pt will be able to tolerate multi-directional UE strengthening without pain to improve functional use of UEs.  3. Pt will increase R shoulder flexion AROM to 120 degrees to indicate improved ability to perform dress self.   4. Pt will report 80% improvement in ability to reach overhead since start of care to indicate improved functional independence.   5. Pt to be Independent with HEP to improve ROM and independence with ADL's.    PLAN     Plan of care Certification: 4/20/2022 to 7/20/22.     Outpatient Physical Therapy 2 times weekly for 12 weeks to include the following interventions: Electrical Stimulation NMES, Manual Therapy, Moist Heat/ Ice, Neuromuscular Re-ed, Patient Education, Therapeutic Activities and Therapeutic Exercise.     Continue with skilled PT towards POC.  Progress UE strength.     Yolanda Marie, PT

## 2022-05-24 ENCOUNTER — CLINICAL SUPPORT (OUTPATIENT)
Dept: REHABILITATION | Facility: HOSPITAL | Age: 77
End: 2022-05-24
Payer: MEDICARE

## 2022-05-24 DIAGNOSIS — M25.611 DECREASED RANGE OF MOTION OF RIGHT SHOULDER: ICD-10-CM

## 2022-05-24 DIAGNOSIS — M62.81 MUSCLE WEAKNESS OF UPPER EXTREMITY: Primary | ICD-10-CM

## 2022-05-24 DIAGNOSIS — R29.3 POSTURAL IMBALANCE: ICD-10-CM

## 2022-05-24 PROCEDURE — 97110 THERAPEUTIC EXERCISES: CPT | Mod: PN

## 2022-05-24 PROCEDURE — 97140 MANUAL THERAPY 1/> REGIONS: CPT | Mod: PN

## 2022-05-26 NOTE — PROGRESS NOTES
[facility]   Physical Therapy Treatment Note     Name: Sharita Gonzalez  Clinic Number: [id number]    Therapy Diagnosis:   Encounter Diagnoses   Name Primary?    Muscle weakness of upper extremity Yes    Decreased range of motion of right shoulder     Postural imbalance      Physician: Kari English    Visit Date: 5/27/2022    Physician: Amador Provider- Juan Antonio Denton MD     Physician Orders: PT Eval and Treat - both shoulders  Medical Diagnosis from Referral: RCT (rotator cuff tear)  Evaluation Date: 4/20/2022  Authorization Period Expiration: 5/5/22  Plan of Care Expiration: 7/20/22  Progress Note Due: 6/20/22  Visit # / Visits authorized: 8/25  FOTO: 1/3    PTA Visit #: 0/5     Precautions: Myasthenia gravis, HTN, R RTC repair 6-7 years ago  *may have to unexpectedly change appts due to wife's medical condition requiring transfusions every 7-10 days    Time In: 0914  Time Out: 1015  Total Billable Time: 38 minutes    SUBJECTIVE     Pt reports: last treatment made his R shoulder really sore for 3 days. Will see MD Thursday.  He was compliant with home exercise program.  Response to previous treatment: good with increased soreness for multiple days  Functional change: less L shoulder pain with daily activities    Pain: 0/10 and 9/10  Location: left shoulder and R shoulder    OBJECTIVE     Taken 5/17/22:  R shoulder ER MMT: 3/5 with pain  R ER lag: positive  Tenderness to posterior aspect of R acromion    TREATMENT     Sharita received therapeutic exercises to develop strength, endurance and ROM for 51 minutes including:    UBE 2'F/2'B  Pulleys flex x 3 min    +L overhead press 1# 4 x 5  Isometric R sh ER 5 sec hold x 20  Isometric R sh abd 5 sec hold x 20  Low biceps stretch on pole 3 x 30 sec ea  Seated B sh ER AROM x 20 ea  Seated L horiz abd w RTB x 20  Seated L IR w RTB x 20  Seated L horiz add w RTB x 20  Supine L sh flex w RTB x 20  +Seated R table slides elevated  scaption x 20  Supine sh flex w 1# dowel x 20   Supine horizontal abduction x 20 RTB  Supine B sh ER 20x5 sec RTB    Supine ABCs 1# x 2 ea  Rows w RTB x 20   +Bicep curls w RTB x 20 ea  B sh ext w GTB 30x 5 sec   Wall slides w RTB 4 x 5  Seated R sh flex AROM w elbow on mat and post RTB pull x 20  Seated sh IR w elbow on mat and RTB x 20 ea  Mat push ups x 20  +Standing B sh ext NOT PAST BODY w RTB x 20  Seated lift off x 20 0# R, 1# L (cue to limit thoracic extension)   Prone rows 3x10 3# R, 5# L  Unilateral Supine press with dumbbell 3x10 3# ea side    Pt received the following manual therapy techniques for 0 minutes:     Suction silicon cupping to L posterior acromion and supraspinatus/uppeer trap x 5 min ea concurrent with L shoulder strengthening + 5 min at L posterior acromion at end of session    The patient received the following supervised modalities after being cleared for contradictions: TENS:  Sharita received TENS electrical stimulation for pain to the R shoulder. Pt received modulated mode for 10 minutes. Sharita tolerated treatment well without any adverse effects.     Ice x 0 min L shoulder at end of session.     PATIENT EDUCATION AND HOME EXERCISES     Home Exercises Provided and Patient Education Provided     Education provided:   - continue HEP    Written Home Exercises Provided: Patient instructed to cont prior HEP. Exercises were reviewed and Sharita was able to demonstrate them prior to the end of the session.  Sharita demonstrated good  understanding of the education provided. See EMR under Patient Instructions for exercises provided during therapy sessions    ASSESSMENT     Sharita had good tolerance to treatment today with no adverse effects. Post-treatment L shoulder pain rated as 0/10 and R shoulder as 2/10. Pt presents to clinic with reports of increased R shoulder pain following last treatment. Primary focus of strengthening on L UE. Pt with good response to AAROM and electrical stimulation to R  shoulder. Good response to progression of exercise program. He demonstrates probable R rotator cuff pathology. Some improvement in L shoulder strength noted in supine. Will continue to progress UE and postural strengthening to tolerance.     Sharita Is progressing well towards his goals.   Pt prognosis is Good.     Pt will continue to benefit from skilled outpatient physical therapy to address the deficits listed in the problem list box on initial evaluation, provide pt/family education and to maximize pt's level of independence in the home and community environment.     Pt's spiritual, cultural and educational needs considered and pt agreeable to plan of care and goals.     Anticipated Barriers for therapy: Myasthenia gravis, chronicity of condition     GOALS: Short Term Goals:  6 weeks   1. Report decreased L shoulder pain  <  / =  5/10 at worst to increase tolerance for doing hair.- met 5/20/22  2. Pt will be able to tolerate multi-directional UE strengthening without pain to improve functional use of UEs.- met 5/20/22  3. Pt will increase R shoulder flexion AROM to 90 degrees to indicate improved ability to perform dress self.- in progress   4. Pt will report 50% improvement in ability to reach overhead since start of care to indicate improved functional independence.- in progress   5. Pt to tolerate HEP to improve ROM and independence with ADL's.- met 5/20/22     Long Term Goals: 12 weeks in progress  1. Report decreased L shoulder pain  <  / =  3/10 at worst to increase tolerance for doing hair.   2. Pt will be able to tolerate multi-directional UE strengthening without pain to improve functional use of UEs.  3. Pt will increase R shoulder flexion AROM to 120 degrees to indicate improved ability to perform dress self.   4. Pt will report 80% improvement in ability to reach overhead since start of care to indicate improved functional independence.   5. Pt to be Independent with HEP to improve ROM and independence  with ADL's.    PLAN     Plan of care Certification: 4/20/2022 to 7/20/22.     Outpatient Physical Therapy 2 times weekly for 12 weeks to include the following interventions: Electrical Stimulation NMES, Manual Therapy, Moist Heat/ Ice, Neuromuscular Re-ed, Patient Education, Therapeutic Activities and Therapeutic Exercise.     Continue with skilled PT towards POC. Progress UE strength.     Yolanda Marie, PT

## 2022-05-27 ENCOUNTER — CLINICAL SUPPORT (OUTPATIENT)
Dept: REHABILITATION | Facility: HOSPITAL | Age: 77
End: 2022-05-27
Payer: MEDICARE

## 2022-05-27 DIAGNOSIS — M25.611 DECREASED RANGE OF MOTION OF RIGHT SHOULDER: ICD-10-CM

## 2022-05-27 DIAGNOSIS — M62.81 MUSCLE WEAKNESS OF UPPER EXTREMITY: Primary | ICD-10-CM

## 2022-05-27 DIAGNOSIS — R29.3 POSTURAL IMBALANCE: ICD-10-CM

## 2022-05-27 PROCEDURE — 97110 THERAPEUTIC EXERCISES: CPT | Mod: PN

## 2022-05-31 ENCOUNTER — CLINICAL SUPPORT (OUTPATIENT)
Dept: REHABILITATION | Facility: HOSPITAL | Age: 77
End: 2022-05-31
Payer: MEDICARE

## 2022-05-31 DIAGNOSIS — R29.3 POSTURAL IMBALANCE: ICD-10-CM

## 2022-05-31 DIAGNOSIS — M62.81 MUSCLE WEAKNESS OF UPPER EXTREMITY: Primary | ICD-10-CM

## 2022-05-31 DIAGNOSIS — M25.611 DECREASED RANGE OF MOTION OF RIGHT SHOULDER: ICD-10-CM

## 2022-05-31 PROCEDURE — 97140 MANUAL THERAPY 1/> REGIONS: CPT | Mod: PN

## 2022-05-31 PROCEDURE — 97110 THERAPEUTIC EXERCISES: CPT | Mod: PN

## 2022-05-31 NOTE — PROGRESS NOTES
[facility]   Physical Therapy Treatment Note     Name: Sharita Gonzalez  Clinic Number: [id number]    Therapy Diagnosis:   Encounter Diagnoses   Name Primary?    Muscle weakness of upper extremity Yes    Decreased range of motion of right shoulder     Postural imbalance      Physician: Kari English    Visit Date: 5/31/2022    Physician: Amador, Provider- Juan Antonio Denton MD     Physician Orders: PT Eval and Treat - both shoulders  Medical Diagnosis from Referral: RCT (rotator cuff tear)  Evaluation Date: 4/20/2022  Authorization Period Expiration: 5/5/22  Plan of Care Expiration: 7/20/22  Progress Note Due: 6/20/22  Visit # / Visits authorized: 9/25  FOTO: 1/3    PTA Visit #: 0/5     Precautions: Myasthenia gravis, HTN, R RTC repair 6-7 years ago  *may have to unexpectedly change appts due to wife's medical condition requiring transfusions every 7-10 days    Time In: 1000  Time Out: 1045  Total Billable Time: 45 minutes    SUBJECTIVE     Pt reports:Continue reporting bilateral UE weakness. Feels as thought PT is helping with his symptoms, however he does not feel he is getting stronger.  He was compliant with home exercise program.  Response to previous treatment: good with increased soreness for multiple days  Functional change: less L shoulder pain with daily activities    Pain: 0/10 bilaterally   Location: left shoulder and R shoulder    OBJECTIVE     None at this time    TREATMENT     Sharita received therapeutic exercises to develop strength, endurance and ROM for 37 minutes including:    SL shoulder ER w/ 2# SB 2x20 (R UE)  SL shoulder ER no weight (L UE)   Supine horizontal abduction x 20 RTB  Serratus punches in supine with 5 lb KB each side   Bicep curls w/ 4# DB 2 x 15   Shoulder rolls 1x15  Rows w RTB 2x15  Low Rows (Lat pull downs) w/ YTB 2x15  Wall slides w towel 2x10 each side      UBE 2'F/2'B  Pulleys flex x 3 min    +L overhead press 1# 4 x 5  Isometric R sh ER 5  sec hold x 20  Isometric R sh abd 5 sec hold x 20  Low biceps stretch on pole 3 x 30 sec ea  Seated B sh ER AROM x 20 ea  Seated L horiz abd w RTB x 20  Seated L IR w RTB x 20  Seated L horiz add w RTB x 20  Supine L sh flex w RTB x 20  +Seated R table slides elevated scaption x 20  Supine sh flex w 1# dowel x 20     Supine B sh ER 20x5 sec RTB    Supine ABCs 1# x 2 ea      B sh ext w GTB 30x 5 sec     Seated R sh flex AROM w elbow on mat and post RTB pull x 20  Seated sh IR w elbow on mat and RTB x 20 ea  Mat push ups x 20  +Standing B sh ext NOT PAST BODY w RTB x 20  Seated lift off x 20 0# R, 1# L (cue to limit thoracic extension)   Prone rows 3x10 3# R, 5# L  Unilateral Supine press with dumbbell 3x10 3# ea side    Pt received the following manual therapy techniques for 8 minutes:     Manual stretching and PROM of bilateral shoulders x 6 minutes  Isometric holds (ER/IR) bilaterally w/ PT applying resistance x 2 minutes      Suction silicon cupping to L posterior acromion and supraspinatus/uppeer trap x 5 min ea concurrent with L shoulder strengthening + 5 min at L posterior acromion at end of session    The patient received the following supervised modalities after being cleared for contradictions: TENS:  Sharita received TENS electrical stimulation for pain to the R shoulder. Pt received modulated mode for 10 minutes. Sharita tolerated treatment well without any adverse effects.     Ice x 0 min L shoulder at end of session.     PATIENT EDUCATION AND HOME EXERCISES     Home Exercises Provided and Patient Education Provided     Education provided:   - continue HEP    Written Home Exercises Provided: Patient instructed to cont prior HEP. Exercises were reviewed and Sharita was able to demonstrate them prior to the end of the session.  Sharita demonstrated good  understanding of the education provided. See EMR under Patient Instructions for exercises provided during therapy sessions    ASSESSMENT     Pt tolerated tx well.  Demonstrated increase weakness and pain in L shoulder when performing side lying ER, therefore no weight was used. Therapist provided verbal/tactile cues to maintain proper form. Pt reported no adverse effects to exercises. Pt will be seeing his orthopedic doctor this Thursday for a F/U.  Post-treatment L shoulder pain rated as 0/10 and R shoulder as 0/10, just some sorenes. Pt would benefit from continued skilled physical therapy in order to reach pt's goals.     Sharita Is progressing well towards his goals.   Pt prognosis is Good.     Pt will continue to benefit from skilled outpatient physical therapy to address the deficits listed in the problem list box on initial evaluation, provide pt/family education and to maximize pt's level of independence in the home and community environment.     Pt's spiritual, cultural and educational needs considered and pt agreeable to plan of care and goals.     Anticipated Barriers for therapy: Myasthenia gravis, chronicity of condition     GOALS: Short Term Goals:  6 weeks   1. Report decreased L shoulder pain  <  / =  5/10 at worst to increase tolerance for doing hair.- met 5/20/22  2. Pt will be able to tolerate multi-directional UE strengthening without pain to improve functional use of UEs.- met 5/20/22  3. Pt will increase R shoulder flexion AROM to 90 degrees to indicate improved ability to perform dress self.- in progress   4. Pt will report 50% improvement in ability to reach overhead since start of care to indicate improved functional independence.- in progress   5. Pt to tolerate HEP to improve ROM and independence with ADL's.- met 5/20/22     Long Term Goals: 12 weeks in progress  1. Report decreased L shoulder pain  <  / =  3/10 at worst to increase tolerance for doing hair.   2. Pt will be able to tolerate multi-directional UE strengthening without pain to improve functional use of UEs.  3. Pt will increase R shoulder flexion AROM to 120 degrees to indicate improved  ability to perform dress self.   4. Pt will report 80% improvement in ability to reach overhead since start of care to indicate improved functional independence.   5. Pt to be Independent with HEP to improve ROM and independence with ADL's.    PLAN     Plan of care Certification: 4/20/2022 to 7/20/22.     Outpatient Physical Therapy 2 times weekly for 12 weeks to include the following interventions: Electrical Stimulation NMES, Manual Therapy, Moist Heat/ Ice, Neuromuscular Re-ed, Patient Education, Therapeutic Activities and Therapeutic Exercise.     Continue with skilled PT towards POC. Progress UE strength.     Lázaro Kent, PT

## 2023-04-04 ENCOUNTER — HOSPITAL ENCOUNTER (OUTPATIENT)
Dept: RADIOLOGY | Facility: HOSPITAL | Age: 78
Discharge: HOME | End: 2023-04-04
Attending: PODIATRIST
Payer: MEDICARE

## 2023-04-04 ENCOUNTER — OFFICE VISIT (OUTPATIENT)
Dept: PODIATRY | Facility: CLINIC | Age: 78
End: 2023-04-04
Payer: MEDICARE

## 2023-04-04 VITALS — HEIGHT: 65 IN | WEIGHT: 189.81 LBS | BODY MASS INDEX: 31.63 KG/M2

## 2023-04-04 DIAGNOSIS — M79.671 RIGHT FOOT PAIN: Primary | ICD-10-CM

## 2023-04-04 DIAGNOSIS — M72.2 PLANTAR FASCIITIS: ICD-10-CM

## 2023-04-04 DIAGNOSIS — M79.671 RIGHT FOOT PAIN: ICD-10-CM

## 2023-04-04 PROCEDURE — 3288F PR FALLS RISK ASSESSMENT DOCUMENTED: ICD-10-PCS | Mod: CPTII,S$GLB,, | Performed by: PODIATRIST

## 2023-04-04 PROCEDURE — 1125F AMNT PAIN NOTED PAIN PRSNT: CPT | Mod: CPTII,S$GLB,, | Performed by: PODIATRIST

## 2023-04-04 PROCEDURE — 73630 X-RAY EXAM OF FOOT: CPT | Mod: 26,RT,, | Performed by: RADIOLOGY

## 2023-04-04 PROCEDURE — 73630 XR FOOT COMPLETE 3 VIEW RIGHT: ICD-10-PCS | Mod: 26,RT,, | Performed by: RADIOLOGY

## 2023-04-04 PROCEDURE — 1159F MED LIST DOCD IN RCRD: CPT | Mod: CPTII,S$GLB,, | Performed by: PODIATRIST

## 2023-04-04 PROCEDURE — 3288F FALL RISK ASSESSMENT DOCD: CPT | Mod: CPTII,S$GLB,, | Performed by: PODIATRIST

## 2023-04-04 PROCEDURE — 1125F PR PAIN SEVERITY QUANTIFIED, PAIN PRESENT: ICD-10-PCS | Mod: CPTII,S$GLB,, | Performed by: PODIATRIST

## 2023-04-04 PROCEDURE — 1101F PT FALLS ASSESS-DOCD LE1/YR: CPT | Mod: CPTII,S$GLB,, | Performed by: PODIATRIST

## 2023-04-04 PROCEDURE — 99213 OFFICE O/P EST LOW 20 MIN: CPT | Mod: S$GLB,,, | Performed by: PODIATRIST

## 2023-04-04 PROCEDURE — 99213 PR OFFICE/OUTPT VISIT, EST, LEVL III, 20-29 MIN: ICD-10-PCS | Mod: S$GLB,,, | Performed by: PODIATRIST

## 2023-04-04 PROCEDURE — 99999 PR PBB SHADOW E&M-EST. PATIENT-LVL III: ICD-10-PCS | Mod: PBBFAC,,, | Performed by: PODIATRIST

## 2023-04-04 PROCEDURE — 1101F PR PT FALLS ASSESS DOC 0-1 FALLS W/OUT INJ PAST YR: ICD-10-PCS | Mod: CPTII,S$GLB,, | Performed by: PODIATRIST

## 2023-04-04 PROCEDURE — 1159F PR MEDICATION LIST DOCUMENTED IN MEDICAL RECORD: ICD-10-PCS | Mod: CPTII,S$GLB,, | Performed by: PODIATRIST

## 2023-04-04 PROCEDURE — 73630 X-RAY EXAM OF FOOT: CPT | Mod: TC,FY,PO,RT

## 2023-04-04 PROCEDURE — 99999 PR PBB SHADOW E&M-EST. PATIENT-LVL III: CPT | Mod: PBBFAC,,, | Performed by: PODIATRIST

## 2023-04-04 RX ORDER — DICLOFENAC SODIUM 10 MG/G
2 GEL TOPICAL DAILY
Qty: 100 G | Refills: 3 | Status: SHIPPED | OUTPATIENT
Start: 2023-04-04

## 2023-04-04 RX ORDER — MELOXICAM 15 MG/1
15 TABLET ORAL DAILY
Qty: 20 TABLET | Refills: 0 | Status: SHIPPED | OUTPATIENT
Start: 2023-04-04 | End: 2024-02-09 | Stop reason: ALTCHOICE

## 2023-04-04 NOTE — PROGRESS NOTES
Subjective:     Patient ID: Sharita Gonzalez is a 77 y.o. male.    Chief Complaint: Foot Pain (Right foot)    Sharita is a 77 y.o. male who presents to the podiatry clinic  with complaint of  right foot pain. Onset of the symptoms was several weeks ago. Precipitating event: none known. Current symptoms include: ability to bear weight, but with some pain. Aggravating factors: any weight bearing. Symptoms have progressed to a point and plateaued. Patient has had no prior foot problems. Evaluation to date: none. Treatment to date: none. Patients rates pain 5/10 on pain scale.    Review of Systems   Constitutional: Negative for chills.   Cardiovascular:  Negative for chest pain and claudication.   Respiratory:  Negative for cough.    Skin:  Positive for color change, dry skin and nail changes.   Musculoskeletal:  Positive for joint pain.   Gastrointestinal:  Negative for nausea.   Neurological:  Positive for paresthesias. Negative for numbness.   Psychiatric/Behavioral:  The patient is not nervous/anxious.       Objective:     Physical Exam  Constitutional:       Appearance: He is well-developed.      Comments: Oriented to time, place, and person.   Cardiovascular:      Comments: DP and PT pulses are palpable bilaterally. 3 sec capillary refill time and toes and feet are warm to touch proximally .  There is  hair growth on the feet and toes b/l. There is no edema b/l. No spider veins or varicosities present b/l.     Musculoskeletal:      Comments: Equinus noted b/l ankles with < 10 deg DF noted. MMT 5/5 in DF/PF/Inv/Ev resistance with no reproduction of pain in any direction. Passive range of motion of ankle and pedal joints is painless b/l.    Pain on palpation plantar medial right heel, no pain with ROM or MMT or medial and lateral compression of heel, - tinel's sign     Feet:      Right foot:      Skin integrity: No callus or dry skin.      Left foot:      Skin integrity: No callus or dry skin.   Lymphadenopathy:       Comments: Negative lymphadenopathy bilateral popliteal fossa and tarsal tunnel.   Skin:     Comments: No open lesions, lacerations or wounds noted.Interdigital spaces clean, dry and intact b/l. No erythema noted to b/l foot.  Nails normal color and trophic qualities.     Neurological:      Mental Status: He is alert.      Comments: Light touch, proprioception, and sharp/dull sensation are all intact bilaterally. Protective threshold with the English-Wienstein monofilament is intact bilaterally.    Psychiatric:         Behavior: Behavior is cooperative.         Assessment:      Encounter Diagnoses   Name Primary?    Right foot pain Yes    Plantar fasciitis      Plan:     Sharita was seen today for foot pain.    Diagnoses and all orders for this visit:    Right foot pain  -     X-Ray Foot Complete Right; Future    Plantar fasciitis  -     X-Ray Foot Complete Right; Future    Other orders  -     diclofenac sodium (VOLTAREN) 1 % Gel; Apply 2 g topically once daily.  -     meloxicam (MOBIC) 15 MG tablet; Take 1 tablet (15 mg total) by mouth once daily.      I counseled the patient on his conditions, their implications and medical management.      Xray right foot     Rx Voltaren gel to be applied to affected area up to 3-4 x daily as needed for pain    Patient instructed on adequate icing techniques. Patient should ice the affected area at least once per day x 10 minutes for 10 days . I advised the patient that extra icing would also be beneficial to ensure adequate anti inflammatory effect      Mobic prescribed. Patient was instructed on dosing information. Discontinue if adverse effects occur      Discussed different treatment options for heel pain. including conservative and interventional.  I gave written and verbal instructions on heel cord stretching and this was demonstrated for the patient. Patient expressed understanding. Discussed wearing appropriate shoe gear and avoiding flats, slippers, sandals, barefoot, and  sockfeet. Recommended arch supports. My recommendation for OTC supports is [employer], [employer] tennis shoes.       Patient instructed on adequate icing techniques. Patient should ice the affected area at least once per day x 10 minutes for 10 days . I advised the  patient that extra icing would also be beneficial to ensure adequate anti inflammatory effect     Stretching handout dispensed to patient. Instructions on adequate stretching reviewed in clinic      RTC PRN

## 2023-08-04 ENCOUNTER — HOSPITAL ENCOUNTER (EMERGENCY)
Facility: HOSPITAL | Age: 78
Discharge: HOME | End: 2023-08-04
Attending: EMERGENCY MEDICINE
Payer: MEDICARE

## 2023-08-04 VITALS
TEMPERATURE: 98 F | OXYGEN SATURATION: 97 % | HEART RATE: 81 BPM | HEIGHT: 65 IN | BODY MASS INDEX: 29.99 KG/M2 | SYSTOLIC BLOOD PRESSURE: 139 MMHG | RESPIRATION RATE: 20 BRPM | WEIGHT: 180 LBS | DIASTOLIC BLOOD PRESSURE: 81 MMHG

## 2023-08-04 DIAGNOSIS — S01.81XA FACIAL LACERATION, INITIAL ENCOUNTER: Primary | ICD-10-CM

## 2023-08-04 DIAGNOSIS — S09.90XA INJURY OF HEAD, INITIAL ENCOUNTER: ICD-10-CM
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
## 2023-08-04 PROCEDURE — 12011 RPR F/E/E/N/L/M 2.5 CM/<: CPT | Mod: ER

## 2023-08-04 PROCEDURE — 99284 EMERGENCY DEPT VISIT MOD MDM: CPT | Mod: 25,ER

## 2023-08-04 PROCEDURE — 25000003 PHARM REV CODE 250: Mod: ER | Performed by: NURSE PRACTITIONER

## 2023-08-04 RX ORDER — LIDOCAINE HYDROCHLORIDE 10 MG/ML
10 INJECTION INFILTRATION; PERINEURAL
Status: COMPLETED | OUTPATIENT
Start: 2023-08-04 | End: 2023-08-04

## 2023-08-04 RX ORDER — BACITRACIN 500 [USP'U]/G
OINTMENT TOPICAL
Status: COMPLETED | OUTPATIENT
Start: 2023-08-04 | End: 2023-08-04

## 2023-08-04 RX ADMIN — LIDOCAINE HYDROCHLORIDE 10 ML: 10 INJECTION, SOLUTION INFILTRATION; PERINEURAL at 08:08

## 2023-08-04 RX ADMIN — BACITRACIN: 500 OINTMENT TOPICAL at 08:08

## 2023-08-05 NOTE — ED PROVIDER NOTES
Encounter Date: 8/4/2023    SCRIBE #1 NOTE: I, Jesus Chin, am scribing for, and in the presence of,  Thom Amaral DNP. I have scribed the following portions of the note - Other sections scribed: HPI, ROS, PE.       History     Chief Complaint   Patient presents with    Facial Laceration     Pt reports he was blowing in the yard, tripped, fell and now has a deep right eyebrow laceration (bleeding controlled). Pt reports no LOC and is only on baby aspirin daily. Unknown Tdap.      Sharita Gonzalez is a 77 y.o. male with past medical history of Hypertension presenting to the Emergency room for further evaluation of laceration to the to the right eyebrow following fall on today. The patient reports blowing his yard when he tripped and fell, hitting his head on the concrete. Denies LOC. He endorses having frequent falls secondary to myasthenia gravis, with 6 falls being within the last month. Patient notes taking baby aspirin daily. Last tetanus 12/22/2023. No other exacerbating or alleviating factors. Denies nausea, vomiting, facial tenderness, epistaxis, dental problem, or other symptoms. No further complaints at present time.     The history is provided by the patient. No  was used.     Review of patient's allergies indicates:   Allergen Reactions    Statins-hmg-coa reductase inhibitors      Back ache with Lipitor     Adhesive Rash     Pulls skin off    May use paper tape    Latex, natural rubber Rash     Past Medical History:   Diagnosis Date    Carpal tunnel syndrome     Hypertension     Polymyalgia     Prostate cancer      Past Surgical History:   Procedure Laterality Date    BRAIN SURGERY  age 27    subarrachnoid brain hemorhage     CARPAL TUNNEL RELEASE Right 7/9/2018    Procedure: RELEASE, CARPAL TUNNEL - RIGHT;  Surgeon: Christine Simpson MD;  Location: [facility];  Service: Orthopedics;  Laterality: Right;  Stretcher; Supine; Hand pan 1 & Pan 2    CATARACT EXTRACTION      EYE  SURGERY      FINGER SURGERY  9-17-13    left TFR    HEMORRHOID SURGERY      radiation seeds   2008    prostate cancer    SHOULDER SURGERY Right     rotator     Family History   Problem Relation Age of Onset    Anesthesia problems Neg Hx     Broken bones Neg Hx     Cancer Neg Hx     Clotting disorder Neg Hx     Collagen disease Neg Hx     Diabetes Neg Hx     Dislocations Neg Hx     Osteoporosis Neg Hx     Rheumatologic disease Neg Hx     Scoliosis Neg Hx     Severe sprains Neg Hx      Social History     Tobacco Use    Smoking status: Light Smoker     Current packs/day: 0.00     Types: Cigars    Smokeless tobacco: Never    Tobacco comments:     smokes a cigar once a month   Substance Use Topics    Alcohol use: No     Comment: occasional beer     Drug use: No     Review of Systems   Constitutional:  Negative for appetite change, chills, diaphoresis, fatigue and fever.   HENT:  Negative for congestion, dental problem, ear discharge, ear pain, nosebleeds, postnasal drip, rhinorrhea, sinus pressure, sneezing, sore throat and voice change.    Eyes:  Negative for discharge, itching and visual disturbance.   Respiratory:  Negative for cough, shortness of breath and wheezing.    Cardiovascular:  Negative for chest pain, palpitations and leg swelling.   Gastrointestinal:  Negative for abdominal pain, nausea and vomiting.   Endocrine: Negative for polydipsia, polyphagia and polyuria.   Genitourinary:  Negative for difficulty urinating, dysuria, frequency, hematuria, penile discharge, penile pain, penile swelling and urgency.   Musculoskeletal:  Negative for arthralgias and myalgias.   Skin:  Positive for wound. Negative for rash.   Neurological:  Negative for dizziness, seizures, syncope and weakness.   Hematological:  Negative for adenopathy. Does not bruise/bleed easily.   Psychiatric/Behavioral:  Negative for agitation and self-injury. The patient is not nervous/anxious.        Physical Exam     Initial Vitals [08/04/23  2000]   BP Pulse Resp Temp SpO2   (!) 144/88 78 18 98.5 °F (36.9 °C) 95 %      MAP       --         Physical Exam    Nursing note and vitals reviewed.  Constitutional: He appears well-developed and well-nourished. He is not diaphoretic. No distress.   HENT:   Head: Normocephalic and atraumatic. Head is without raccoon's eyes and without Pierce's sign.   Right Ear: External ear normal. No hemotympanum.   Left Ear: External ear normal. No hemotympanum.   Nose: Nose normal.   Eyes: Pupils are equal, round, and reactive to light. Right eye exhibits no discharge. Left eye exhibits no discharge. No scleral icterus.   Neck:   Normal range of motion.  Pulmonary/Chest: No respiratory distress.   Abdominal: He exhibits no distension.   Musculoskeletal:         General: Normal range of motion.      Cervical back: Normal range of motion. Normal range of motion.      Comments: Spine is atraumatic, without step-offs or tenderness.      Neurological: He is alert and oriented to person, place, and time. He has normal strength. No cranial nerve deficit or sensory deficit. He exhibits normal muscle tone. He displays a negative Romberg sign. Coordination and gait normal. GCS eye subscore is 4. GCS verbal subscore is 5. GCS motor subscore is 6.   Equal  strength bilaterally, equal bicep flexion and tricep extension strength, leg extension and flexion strength appropriate and equal, foot plantar- and dorsi-flexion equal and appropriate   Skin: Skin is dry. Capillary refill takes less than 2 seconds.   1.5 cm laceration perpendicular to the outer canvas of the right eye. No active bleeding. No facial tenderness.          ED Course   Lac Repair    Date/Time: 8/4/2023 8:41 PM    Performed by: Thom Amaral  Authorized by: Thom Amaral DNP    Consent:     Consent obtained:  Verbal    Consent given by:  Patient  Universal protocol:     Patient identity confirmed:  Verbally with patient  Anesthesia:     Anesthesia method:   Local infiltration    Local anesthetic:  Lidocaine 1% w/o epi  Laceration details:     Location:  Face    Length (cm):  1.5  Treatment:     Area cleansed with:  Chlorhexidine and saline  Skin repair:     Repair method:  Sutures    Suture size:  5-0    Number of sutures:  4    Labs Reviewed - No data to display       Imaging Results              CT Cervical Spine Without Contrast (Final result)  Result time 08/04/23 20:50:35      Final result by Cecile Blackman MD (08/04/23 20:50:35)                   Impression:      No acute intracranial hemorrhage.  Multiple age-indeterminate lacunar infarcts.    No acute cervical fracture.  Spondylitic changes.  Straightening of the normal cervical lordosis.      Electronically signed by: Cecile Blackman  Date:    08/04/2023  Time:    20:50               Narrative:    EXAMINATION:  CT OF THE HEAD WITHOUT AND CT CERVICAL SPINE    CLINICAL HISTORY:  Head trauma, minor (Age >= 65y);; Neck trauma (Age >= 65y);    TECHNIQUE:  5 mm unenhanced axial images were obtained from the skull base to the vertex.  1.25 mm axial images were obtained through the cervical spine.    COMPARISON:  CT head 11/12/2018    FINDINGS:  CT head: The ventricles, basal cisterns, and cortical sulci are within normal limits for patient's stated age.  Age-indeterminate lacunar infarcts are seen in the anterior limb of the left internal capsule, left basal ganglia and right cerebellar hemisphere.  There is no acute intracranial hemorrhage, territorial infarct or mass effect, or midline shift. The visualized paranasal sinuses and mastoid air cells are clear.  Advanced vascular calcifications are seen involving the supraclinoid portions of the internal carotid arteries and the vertebral arteries.    CT cervical spine: There is straightening of the normal cervical lordosis, which may be due to muscular spasm or the presence of a cervical neck collar.  There is no acute fracture or subluxation.  Spondylitic  changes are present.  Degenerative changes are seen at the temporomandibular joints.  Vascular calcifications are seen involving the carotid bulbs.                                       CT Head Without Contrast (Final result)  Result time 08/04/23 20:50:35      Final result by Cecile Blackman MD (08/04/23 20:50:35)                   Impression:      No acute intracranial hemorrhage.  Multiple age-indeterminate lacunar infarcts.    No acute cervical fracture.  Spondylitic changes.  Straightening of the normal cervical lordosis.      Electronically signed by: Cecile Blackman  Date:    08/04/2023  Time:    20:50               Narrative:    EXAMINATION:  CT OF THE HEAD WITHOUT AND CT CERVICAL SPINE    CLINICAL HISTORY:  Head trauma, minor (Age >= 65y);; Neck trauma (Age >= 65y);    TECHNIQUE:  5 mm unenhanced axial images were obtained from the skull base to the vertex.  1.25 mm axial images were obtained through the cervical spine.    COMPARISON:  CT head 11/12/2018    FINDINGS:  CT head: The ventricles, basal cisterns, and cortical sulci are within normal limits for patient's stated age.  Age-indeterminate lacunar infarcts are seen in the anterior limb of the left internal capsule, left basal ganglia and right cerebellar hemisphere.  There is no acute intracranial hemorrhage, territorial infarct or mass effect, or midline shift. The visualized paranasal sinuses and mastoid air cells are clear.  Advanced vascular calcifications are seen involving the supraclinoid portions of the internal carotid arteries and the vertebral arteries.    CT cervical spine: There is straightening of the normal cervical lordosis, which may be due to muscular spasm or the presence of a cervical neck collar.  There is no acute fracture or subluxation.  Spondylitic changes are present.  Degenerative changes are seen at the temporomandibular joints.  Vascular calcifications are seen involving the carotid bulbs.                                        Medications   LIDOcaine HCL 10 mg/ml (1%) injection 10 mL (10 mLs Infiltration Given 8/4/23 2020)   bacitracin ointment ( Topical (Top) Given 8/4/23 2021)     Medical Decision Making:   History:   Old Medical Records: I decided to obtain old medical records.  Initial Assessment:   77 y.o. male presents to the ER for laceration to the right eye. Patient has a history of Hypertension and frequent falls secondary to myasthenia gravis. On exam, patient had 1.5 cm laceration perpendicular to the outer canvas of the right eye. CT imaging ordered.  Differential Diagnosis:   Includes but is not limited to: Contusion, Laceration, Concussion.   Clinical Tests:   Radiological Study: Ordered and Reviewed  Medical Decision Making  Problems Addressed:  Facial laceration, initial encounter: acute illness or injury     Details: Closed with sutures per procedure note above.  Due to patient's propensity for falling I have decided against using narcotic pain medication.  He should use Tylenol or ibuprofen over-the-counter.  Fall, initial encounter: acute illness or injury     Details: Patient has frequent falls secondary to his history of myasthenia gravis.  Injury of head, initial encounter: acute illness or injury     Details: CT and C-spine negative for acute abnormality.    Amount and/or Complexity of Data Reviewed  Radiology: ordered. Decision-making details documented in ED Course.    Risk  OTC drugs.  Prescription drug management.  Minor surgery with identified risk factors.    -023      Scribe attestation: Scribe attestation: Thom HINOJOSA, DNP ACNP-BC FNP-C ENP-C,  personally performed the services described in this documentation. All medical record entries made by the scribe were at my direction and in my presence.  I have reviewed the chart and agree that the record reflects my personal performance and is accurate and complete.       Scribe Attestation:   Scribe #1: I performed the above scribed service  "and the documentation accurately describes the services I performed. I attest to the accuracy of the note.        ED Course as of 08/04/23 2308   Fri Aug 04, 2023   2039 BP(!): 144/88 [VC]   2039 Temp: 98.5 °F (36.9 °C) [VC]   2039 Temp Source: Oral [VC]   2040 Pulse: 78 [VC]   2040 Resp: 18 [VC]   2040 SpO2: 95 % [VC]   2053 CT Head Without Contrast  No acute intracranial hemorrhage.  Multiple age-indeterminate lacunar infarcts.     No acute cervical fracture.  Spondylitic changes.  Straightening of the normal cervical lordosis. [VC]      ED Course User Index  [VC] Thom Amaral DNP                   Clinical Impression:   Final diagnoses:  [S01.81XA] Facial laceration, initial encounter (Primary)  [W19.XXXA] Fall, initial encounter  [S09.90XA] Injury of head, initial encounter        ED Disposition Condition    Discharge Stable        Vital signs at the time of disposition were:  /81 (BP Location: Left arm, Patient Position: Sitting)   Pulse 81   Temp 98.2 °F (36.8 °C) (Oral)   Resp 20   Ht 5' 5" (1.651 m)   Wt 81.6 kg (180 lb)   SpO2 97%   BMI 29.95 kg/m²       See AVS for additional recommendations. Medications listed herein were prescribed after reviewing the patient's allergies, medication list, history, most recent laboratories as available.  Referrals below were provided after reviewing the patient's previous medical providers. He understands he  should return for any worsening or changes in condition.  Prior to discharge the patient was asked if he  had any additional concerns or complaints and he declined. The patient was given an opportunity to ask questions and all were answered to his satisfaction.    ED Prescriptions    None       Follow-up Information       Follow up With Specialties Details Why Contact Info    Dawood Leo MD Internal Medicine Schedule an appointment as soon as possible for a visit  As needed [place] GRAF [id number] [phone]      [facility] In 10 days For suture removal [address] [facility] [address]  [phone]             Thom Amaral, ESTEBAN  08/04/23 8616

## 2023-08-05 NOTE — DISCHARGE INSTRUCTIONS
Tylenol for pain.  Return to the Emergency Department for any worsening, change in condition, or any emergent concerns.

## 2023-11-19 NOTE — PROGRESS NOTES
Patient:  Sharita Gonzalez  [facility] #:  [id number]   Date of Note: 4/4/2018  Referring Physician: La Gallegos PA-C Dr. Sisco-Wise  Diagnosis:    Encounter Diagnoses   Name Primary?    Right arm pain Yes    S/P right rotator cuff repair     Range of motion deficit       Start Time:  9:05  am  End Time: 9:55 am  Total Time: 50 min    Group Time: -    17 of 20 visits expires 12/31/18  Surgery 1/3/18  12 weeks 0 days post op    Subjective:   Pt reports he is doing his exercises at home.  Pain: 1-2 out of 10 pre therapy more soreness then pain.    Objective:   Patient seen by OT this session. Treatment  consist of the following:  Therapeutic exercises    Treatment: Therapeutic exercises x 42 min  Sci fit with BUE instructed for forward direction only. Pt rec'd passive stretch of right shoulder with minimal to no tightness. Scapular mobilization performed as well.   Pt engaged in supine active flexion with 1/2# x 2 sets of 10 reps.   Supine scapular protraction with 2# x 2 sets of 10 reps.    Sidelying abduction to 90 x 10 reps.    Prone extension with ER 1# x 2 sets of 10 reps  Varinder stabilization on plinth step outs left x 2 sets of 10 reps. Pt unable to step out right without scapular compensation     Pt rec'd ice x 8 min after ex.   Pt encouraged to continue with his HEP.    Plan of care rec'd from Dr. Simpson for care from 1/29/18 to 3/26/18.    Assessment:  Pt weakness of right shoulder remains with poor light resistive varinder step out. Pt will continue to benefit from skilled OT intervention. Medical necessity is demonstrated by: Pt continues to be limited in functional and leisurely pursuits. Pain limits patients participation in ADL's. Pt is not able to carryout necessary vocational tasks. Patient continues to requires cues and skilled supervision to complete HEP      Plan:  Continue with plan of care 1-2 x week x 8 weeks  during the certification period from  2/28/18 to 4/25/18  in pursuit of  OT goals.  Pt  to f/u with Dr. Simpson 2/16/18.     DIFFUSE

## 2023-12-30 ENCOUNTER — HOSPITAL ENCOUNTER (INPATIENT)
Facility: HOSPITAL | Age: 78
LOS: 4 days | Discharge: HOME | DRG: 177 | End: 2024-01-03
Attending: EMERGENCY MEDICINE | Admitting: INTERNAL MEDICINE
Payer: MEDICARE

## 2023-12-30 DIAGNOSIS — R07.9 CHEST PAIN: ICD-10-CM

## 2023-12-30 DIAGNOSIS — R09.02 HYPOXIA: ICD-10-CM

## 2023-12-30 DIAGNOSIS — U07.1 COVID-19: Primary | ICD-10-CM

## 2023-12-30 DIAGNOSIS — R06.02 SOB (SHORTNESS OF BREATH): ICD-10-CM

## 2023-12-30 LAB
ALBUMIN SERPL-MCNC: 3.6 G/DL (ref 3.3–5.5)
ALLENS TEST: ABNORMAL
ALLENS TEST: ABNORMAL
ALP SERPL-CCNC: 44 U/L (ref 42–141)
BILIRUB SERPL-MCNC: 1.2 MG/DL (ref 0.2–1.6)
BUN SERPL-MCNC: 16 MG/DL (ref 7–22)
CALCIUM SERPL-MCNC: 9.3 MG/DL (ref 8–10.3)
CHLORIDE SERPL-SCNC: 96 MMOL/L (ref 98–108)
CK SERPL-CCNC: 29 U/L (ref 20–200)
CREAT SERPL-MCNC: 1.2 MG/DL (ref 0.6–1.2)
CRP SERPL-MCNC: 62.7 MG/L (ref 0–8.2)
CTP QC/QA: YES
GLUCOSE SERPL-MCNC: 105 MG/DL (ref 73–118)
HCO3 UR-SCNC: 30.4 MMOL/L (ref 24–28)
HCO3 UR-SCNC: 32.2 MMOL/L (ref 24–28)
HCT, POC: NORMAL
HGB, POC: NORMAL (ref 14–18)
INFLUENZA A ANTIGEN, POC: NEGATIVE
INFLUENZA B ANTIGEN, POC: NEGATIVE
LDH SERPL L TO P-CCNC: 1.88 MMOL/L (ref 0.5–2.2)
LDH SERPL L TO P-CCNC: 2.27 MMOL/L (ref 0.5–2.2)
MCH, POC: NORMAL
MCHC, POC: NORMAL
MCV, POC: NORMAL
MPV, POC: NORMAL
PCO2 BLDA: 49.2 MMHG (ref 35–45)
PCO2 BLDA: 54.8 MMHG (ref 35–45)
PH SMN: 7.38 [PH] (ref 7.35–7.45)
PH SMN: 7.4 [PH] (ref 7.35–7.45)
PO2 BLDA: 23 MMHG (ref 40–60)
PO2 BLDA: 36 MMHG (ref 40–60)
POC ALT (SGPT): 19 U/L (ref 10–47)
POC AST (SGOT): 24 U/L (ref 11–38)
POC B-TYPE NATRIURETIC PEPTIDE: 888 PG/ML (ref 0–100)
POC BE: 5 MMOL/L
POC BE: 5 MMOL/L
POC CARDIAC TROPONIN I: 0.18 NG/ML (ref 0–0.08)
POC PLATELET COUNT: NORMAL
POC SATURATED O2: 36 % (ref 95–100)
POC SATURATED O2: 69 % (ref 95–100)
POC TCO2: 30 MMOL/L (ref 18–33)
POC TCO2: 32 MMOL/L (ref 24–29)
POC TCO2: 34 MMOL/L (ref 24–29)
POCT GLUCOSE: 143 MG/DL (ref 70–110)
POTASSIUM BLD-SCNC: 3.6 MMOL/L (ref 3.6–5.1)
PROCALCITONIN SERPL IA-MCNC: 0.08 NG/ML
PROTEIN, POC: 6.3 G/DL (ref 6.4–8.1)
RBC, POC: NORMAL
RDW, POC: NORMAL
SAMPLE: ABNORMAL
SARS-COV-2 RDRP RESP QL NAA+PROBE: POSITIVE
SITE: ABNORMAL
SITE: ABNORMAL
SODIUM BLD-SCNC: 139 MMOL/L (ref 128–145)
WBC, POC: NORMAL

## 2023-12-30 PROCEDURE — 94761 N-INVAS EAR/PLS OXIMETRY MLT: CPT | Mod: ER,XB

## 2023-12-30 PROCEDURE — 82728 ASSAY OF FERRITIN: CPT | Performed by: EMERGENCY MEDICINE

## 2023-12-30 PROCEDURE — 25000003 PHARM REV CODE 250: Performed by: STUDENT IN AN ORGANIZED HEALTH CARE EDUCATION/TRAINING PROGRAM

## 2023-12-30 PROCEDURE — 82803 BLOOD GASES ANY COMBINATION: CPT | Mod: ER

## 2023-12-30 PROCEDURE — 93010 ELECTROCARDIOGRAM REPORT: CPT | Mod: ,,, | Performed by: INTERNAL MEDICINE

## 2023-12-30 PROCEDURE — 25000003 PHARM REV CODE 250: Mod: ER | Performed by: EMERGENCY MEDICINE

## 2023-12-30 PROCEDURE — 63600175 PHARM REV CODE 636 W HCPCS: Mod: ER | Performed by: EMERGENCY MEDICINE

## 2023-12-30 PROCEDURE — 85025 COMPLETE CBC W/AUTO DIFF WBC: CPT | Mod: ER

## 2023-12-30 PROCEDURE — 80053 COMPREHEN METABOLIC PANEL: CPT | Mod: ER

## 2023-12-30 PROCEDURE — 84145 PROCALCITONIN (PCT): CPT | Performed by: EMERGENCY MEDICINE

## 2023-12-30 PROCEDURE — 96360 HYDRATION IV INFUSION INIT: CPT | Mod: ER

## 2023-12-30 PROCEDURE — 99285 EMERGENCY DEPT VISIT HI MDM: CPT | Mod: 25,ER

## 2023-12-30 PROCEDURE — 86140 C-REACTIVE PROTEIN: CPT | Performed by: EMERGENCY MEDICINE

## 2023-12-30 PROCEDURE — 99900035 HC TECH TIME PER 15 MIN (STAT)

## 2023-12-30 PROCEDURE — 94640 AIRWAY INHALATION TREATMENT: CPT | Mod: ER,XB

## 2023-12-30 PROCEDURE — 21400001 HC TELEMETRY ROOM

## 2023-12-30 PROCEDURE — 25000242 PHARM REV CODE 250 ALT 637 W/ HCPCS: Mod: ER | Performed by: EMERGENCY MEDICINE

## 2023-12-30 PROCEDURE — 87635 SARS-COV-2 COVID-19 AMP PRB: CPT | Mod: ER | Performed by: EMERGENCY MEDICINE

## 2023-12-30 PROCEDURE — 87040 BLOOD CULTURE FOR BACTERIA: CPT | Mod: 59 | Performed by: EMERGENCY MEDICINE

## 2023-12-30 PROCEDURE — XW033E5 INTRODUCTION OF REMDESIVIR ANTI-INFECTIVE INTO PERIPHERAL VEIN, PERCUTANEOUS APPROACH, NEW TECHNOLOGY GROUP 5: ICD-10-PCS | Performed by: EMERGENCY MEDICINE

## 2023-12-30 PROCEDURE — 87502 INFLUENZA DNA AMP PROBE: CPT | Mod: ER

## 2023-12-30 PROCEDURE — 27000207 HC ISOLATION

## 2023-12-30 PROCEDURE — 93005 ELECTROCARDIOGRAM TRACING: CPT | Mod: ER

## 2023-12-30 PROCEDURE — 82550 ASSAY OF CK (CPK): CPT | Performed by: EMERGENCY MEDICINE

## 2023-12-30 PROCEDURE — 63600175 PHARM REV CODE 636 W HCPCS: Performed by: STUDENT IN AN ORGANIZED HEALTH CARE EDUCATION/TRAINING PROGRAM

## 2023-12-30 RX ORDER — SODIUM CHLORIDE 0.9 % (FLUSH) 0.9 %
10 SYRINGE (ML) INJECTION
Status: DISCONTINUED | OUTPATIENT
Start: 2023-12-30 | End: 2024-01-03 | Stop reason: HOSPADM

## 2023-12-30 RX ORDER — IBUPROFEN 200 MG
16 TABLET ORAL
Status: DISCONTINUED | OUTPATIENT
Start: 2023-12-30 | End: 2024-01-03 | Stop reason: HOSPADM

## 2023-12-30 RX ORDER — SIMETHICONE 80 MG
1 TABLET,CHEWABLE ORAL 4 TIMES DAILY PRN
Status: DISCONTINUED | OUTPATIENT
Start: 2023-12-30 | End: 2024-01-03 | Stop reason: HOSPADM

## 2023-12-30 RX ORDER — SODIUM CHLORIDE 0.9 % (FLUSH) 0.9 %
10 SYRINGE (ML) INJECTION EVERY 12 HOURS PRN
Status: DISCONTINUED | OUTPATIENT
Start: 2023-12-30 | End: 2024-01-03 | Stop reason: HOSPADM

## 2023-12-30 RX ORDER — IBUPROFEN 200 MG
24 TABLET ORAL
Status: DISCONTINUED | OUTPATIENT
Start: 2023-12-30 | End: 2024-01-03 | Stop reason: HOSPADM

## 2023-12-30 RX ORDER — ENOXAPARIN SODIUM 100 MG/ML
1 INJECTION SUBCUTANEOUS EVERY 12 HOURS
Status: DISCONTINUED | OUTPATIENT
Start: 2023-12-30 | End: 2024-01-03 | Stop reason: HOSPADM

## 2023-12-30 RX ORDER — POLYETHYLENE GLYCOL 3350 17 G/17G
17 POWDER, FOR SOLUTION ORAL DAILY
Status: DISCONTINUED | OUTPATIENT
Start: 2023-12-31 | End: 2024-01-03 | Stop reason: HOSPADM

## 2023-12-30 RX ORDER — ONDANSETRON 2 MG/ML
4 INJECTION INTRAMUSCULAR; INTRAVENOUS EVERY 8 HOURS PRN
Status: DISCONTINUED | OUTPATIENT
Start: 2023-12-30 | End: 2024-01-03 | Stop reason: HOSPADM

## 2023-12-30 RX ORDER — ASCORBIC ACID 500 MG
500 TABLET ORAL 2 TIMES DAILY
Status: DISCONTINUED | OUTPATIENT
Start: 2023-12-30 | End: 2024-01-03 | Stop reason: HOSPADM

## 2023-12-30 RX ORDER — MAG HYDROX/ALUMINUM HYD/SIMETH 200-200-20
30 SUSPENSION, ORAL (FINAL DOSE FORM) ORAL 4 TIMES DAILY PRN
Status: DISCONTINUED | OUTPATIENT
Start: 2023-12-30 | End: 2024-01-03 | Stop reason: HOSPADM

## 2023-12-30 RX ORDER — ACETAMINOPHEN 325 MG/1
650 TABLET ORAL EVERY 8 HOURS PRN
Status: DISCONTINUED | OUTPATIENT
Start: 2023-12-30 | End: 2024-01-03 | Stop reason: HOSPADM

## 2023-12-30 RX ORDER — ENOXAPARIN SODIUM 100 MG/ML
1 INJECTION SUBCUTANEOUS 2 TIMES DAILY
Status: DISCONTINUED | OUTPATIENT
Start: 2023-12-30 | End: 2023-12-30

## 2023-12-30 RX ORDER — DEXAMETHASONE SODIUM PHOSPHATE 4 MG/ML
8 INJECTION, SOLUTION INTRA-ARTICULAR; INTRALESIONAL; INTRAMUSCULAR; INTRAVENOUS; SOFT TISSUE
Status: COMPLETED | OUTPATIENT
Start: 2023-12-30 | End: 2023-12-30

## 2023-12-30 RX ORDER — SODIUM,POTASSIUM PHOSPHATES 280-250MG
2 POWDER IN PACKET (EA) ORAL
Status: DISCONTINUED | OUTPATIENT
Start: 2023-12-30 | End: 2024-01-03 | Stop reason: HOSPADM

## 2023-12-30 RX ORDER — ACETAMINOPHEN 500 MG
1000 TABLET ORAL
Status: COMPLETED | OUTPATIENT
Start: 2023-12-30 | End: 2023-12-30

## 2023-12-30 RX ORDER — GLUCAGON 1 MG
1 KIT INJECTION
Status: DISCONTINUED | OUTPATIENT
Start: 2023-12-30 | End: 2024-01-03 | Stop reason: HOSPADM

## 2023-12-30 RX ORDER — INSULIN ASPART 100 [IU]/ML
0-5 INJECTION, SOLUTION INTRAVENOUS; SUBCUTANEOUS
Status: DISCONTINUED | OUTPATIENT
Start: 2023-12-30 | End: 2024-01-03 | Stop reason: HOSPADM

## 2023-12-30 RX ORDER — ACETAMINOPHEN 325 MG/1
650 TABLET ORAL EVERY 4 HOURS PRN
Status: DISCONTINUED | OUTPATIENT
Start: 2023-12-30 | End: 2024-01-03 | Stop reason: HOSPADM

## 2023-12-30 RX ORDER — IPRATROPIUM BROMIDE AND ALBUTEROL SULFATE 2.5; .5 MG/3ML; MG/3ML
3 SOLUTION RESPIRATORY (INHALATION)
Status: COMPLETED | OUTPATIENT
Start: 2023-12-30 | End: 2023-12-30

## 2023-12-30 RX ORDER — FACIAL-BODY WIPES
10 EACH TOPICAL DAILY PRN
Status: DISCONTINUED | OUTPATIENT
Start: 2023-12-30 | End: 2024-01-03 | Stop reason: HOSPADM

## 2023-12-30 RX ORDER — IPRATROPIUM BROMIDE AND ALBUTEROL SULFATE 2.5; .5 MG/3ML; MG/3ML
3 SOLUTION RESPIRATORY (INHALATION) EVERY 4 HOURS PRN
Status: DISCONTINUED | OUTPATIENT
Start: 2023-12-30 | End: 2024-01-03 | Stop reason: HOSPADM

## 2023-12-30 RX ORDER — PROCHLORPERAZINE EDISYLATE 5 MG/ML
5 INJECTION INTRAMUSCULAR; INTRAVENOUS EVERY 6 HOURS PRN
Status: DISCONTINUED | OUTPATIENT
Start: 2023-12-30 | End: 2024-01-03 | Stop reason: HOSPADM

## 2023-12-30 RX ORDER — NALOXONE HCL 0.4 MG/ML
0.02 VIAL (ML) INJECTION
Status: DISCONTINUED | OUTPATIENT
Start: 2023-12-30 | End: 2024-01-03 | Stop reason: HOSPADM

## 2023-12-30 RX ORDER — TALC
6 POWDER (GRAM) TOPICAL NIGHTLY PRN
Status: DISCONTINUED | OUTPATIENT
Start: 2023-12-30 | End: 2024-01-03 | Stop reason: HOSPADM

## 2023-12-30 RX ADMIN — ENOXAPARIN SODIUM 90 MG: 100 INJECTION SUBCUTANEOUS at 10:12

## 2023-12-30 RX ADMIN — IPRATROPIUM BROMIDE AND ALBUTEROL SULFATE 3 ML: .5; 3 SOLUTION RESPIRATORY (INHALATION) at 02:12

## 2023-12-30 RX ADMIN — REMDESIVIR 200 MG: 100 INJECTION, POWDER, LYOPHILIZED, FOR SOLUTION INTRAVENOUS at 11:12

## 2023-12-30 RX ADMIN — ACETAMINOPHEN 1000 MG: 500 TABLET ORAL at 05:12

## 2023-12-30 RX ADMIN — OXYCODONE HYDROCHLORIDE AND ACETAMINOPHEN 500 MG: 500 TABLET ORAL at 10:12

## 2023-12-30 RX ADMIN — DEXAMETHASONE SODIUM PHOSPHATE 8 MG: 4 INJECTION INTRA-ARTICULAR; INTRALESIONAL; INTRAMUSCULAR; INTRAVENOUS; SOFT TISSUE at 04:12

## 2023-12-30 RX ADMIN — SODIUM CHLORIDE, POTASSIUM CHLORIDE, SODIUM LACTATE AND CALCIUM CHLORIDE 1845 ML: 600; 310; 30; 20 INJECTION, SOLUTION INTRAVENOUS at 03:12

## 2023-12-30 NOTE — ASSESSMENT & PLAN NOTE
-Currently on decadron, continue Prednisone 15 mg on discharge   -follow-up with outpatient Neurology Clinic

## 2023-12-30 NOTE — H&P
Hospital Medicine  History & Physical    Patient Name: Sharita Gonzalez  MRN: [id number]  Patient Class: IP- Inpatient  Admission Date: 12/30/2023  Attending Physician: Killian Gutierrez MD   Primary Care Provider: Dawood Leo MD         Patient information was obtained from patient, past medical records, and ER records.     Subjective:     Principal Problem:COVID-19 virus infection    Chief Complaint:   Chief Complaint   Patient presents with    URI     Nasal congestion, fever and weakness for 3 weeks   Temp max at home of 101        HPI: Mrs. Gonzalez he has a pleasant 78-year-old gentleman who has a past medical history of hypertension, subarachnoid hemorrhage, prostate cancer in the past, rectal cancer, unremarkable recent cardiac evaluation with a Lexiscan stress test and echo showed preserved EF 55% the patient presents to the emergency room with complaints of not feeling well for the last 3 weeks, got worse overnight.  Patient complains of nasal congestion, cough with green phlegm,  fevers, and weakness.  Patient has vital signs of the ER so oxygen such as 89% on room air, HR 87.  The patient states nothing has hernandez her feel better, shortness of breast worse with exertion.  The patient had laboratories and x-rays done in the ER.  Patient's laboratories show COVID-19 positive, sodium 139, potassium 3.6, creatinine 1.2, lactic acid 2.27, pH 7.37, pCO2 54.  Chest x-ray shows no acute changes.  The patient was recommended to be admitted to the hospital for acute respiratory failure with hypoxia due to COVID-19 infection.  Patient placed on COVID order set, Rocephin, Zithromax, Decadron, and possible to be started on remdesivir.  The patient is a full code status.    Past Medical History:   Diagnosis Date    Carpal tunnel syndrome     Hypertension     Polymyalgia     Prostate cancer        Past Surgical History:   Procedure Laterality Date    BRAIN SURGERY  age 27    subarrachnoid brain hemorhage     CARPAL  TUNNEL RELEASE Right 7/9/2018    Procedure: RELEASE, CARPAL TUNNEL - RIGHT;  Surgeon: Christine Simpson MD;  Location: [facility];  Service: Orthopedics;  Laterality: Right;  Stretcher; Supine; Hand pan 1 & Pan 2    CATARACT EXTRACTION      EYE SURGERY      FINGER SURGERY  9-17-13    left TFR    HEMORRHOID SURGERY      radiation seeds   2008    prostate cancer    SHOULDER SURGERY Right     rotator       Review of patient's allergies indicates:   Allergen Reactions    Statins-hmg-coa reductase inhibitors      Back ache with Lipitor     Adhesive Rash     Pulls skin off    May use paper tape    Latex, natural rubber Rash       No current facility-administered medications on file prior to encounter.     Current Outpatient Medications on File Prior to Encounter   Medication Sig    aspirin (ECOTRIN) 81 MG EC tablet Take 81 mg by mouth once daily.    diclofenac sodium (VOLTAREN) 1 % Gel Apply 2 g topically once daily.    gabapentin (NEURONTIN) 100 MG capsule Take 300 mg by mouth every evening.     meloxicam (MOBIC) 15 MG tablet Take 1 tablet (15 mg total) by mouth once daily.    multivitamin capsule Take 1 capsule by mouth once daily.    predniSONE (DELTASONE) 1 MG tablet Take 4 tablets (4 mg total) by mouth once daily.    predniSONE (DELTASONE) 5 MG tablet Take 2 tablets (10 mg total) by mouth once daily. (Patient taking differently: Take 20 mg by mouth once daily.)    trandolapril (MAVIK) 4 MG Tab 4 mg every morning.     venlafaxine (EFFEXOR-XR) 75 MG 24 hr capsule Take 75 mg by mouth.    verapamil (CALAN-SR) 240 MG CR tablet Take 240 mg by mouth every morning.      Family History    None       Tobacco Use    Smoking status: Light Smoker     Types: Cigars    Smokeless tobacco: Never    Tobacco comments:     smokes a cigar once a month   Substance and Sexual Activity    Alcohol use: No     Comment: occasional beer     Drug use: No    Sexual activity: Not on file     Review of Systems   Constitutional:  Positive for  chills and fever.   HENT:  Positive for congestion.    Eyes: Negative.    Respiratory:  Positive for cough and shortness of breath.    Cardiovascular: Negative.    Gastrointestinal: Negative.    Endocrine: Negative.    Genitourinary: Negative.    Musculoskeletal: Negative.    Skin: Negative.    Allergic/Immunologic: Negative.    Neurological: Negative.    Hematological: Negative.    Psychiatric/Behavioral: Negative.       Objective:     Vital Signs (Most Recent):  Temp: (!) 101.6 °F (38.7 °C) (12/30/23 1646)  Pulse: 88 (12/30/23 1646)  Resp: (!) 24 (12/30/23 1646)  BP: 133/60 (12/30/23 1646)  SpO2: 98 % (12/30/23 1646) Vital Signs (24h Range):  Temp:  [98.5 °F (36.9 °C)-101.6 °F (38.7 °C)] 101.6 °F (38.7 °C)  Pulse:  [80-88] 88  Resp:  [20-26] 24  SpO2:  [91 %-98 %] 98 %  BP: ()/(60-72) 133/60     Weight: 84.8 kg (187 lb)  Body mass index is 31.12 kg/m².     Physical Exam  Vitals reviewed.   Constitutional:       Appearance: Normal appearance. He is normal weight.   HENT:      Head: Normocephalic and atraumatic.      Right Ear: External ear normal.      Left Ear: External ear normal.      Nose: Nose normal.      Mouth/Throat:      Mouth: Mucous membranes are moist.      Pharynx: Oropharynx is clear.   Eyes:      Extraocular Movements: Extraocular movements intact.      Conjunctiva/sclera: Conjunctivae normal.      Pupils: Pupils are equal, round, and reactive to light.   Cardiovascular:      Rate and Rhythm: Normal rate and regular rhythm.      Pulses: Normal pulses.      Heart sounds: Normal heart sounds.   Pulmonary:      Effort: Pulmonary effort is normal.      Breath sounds: Rhonchi present.   Abdominal:      General: Abdomen is flat. Bowel sounds are normal.      Palpations: Abdomen is soft.   Musculoskeletal:         General: Normal range of motion.      Cervical back: Normal range of motion and neck supple.   Skin:     General: Skin is warm.      Capillary Refill: Capillary refill takes less than 2  seconds.   Neurological:      General: No focal deficit present.      Mental Status: He is alert and oriented to person, place, and time. Mental status is at baseline.      Cranial Nerves: No cranial nerve deficit.   Psychiatric:         Mood and Affect: Mood normal.         Behavior: Behavior normal.         Thought Content: Thought content normal.         Judgment: Judgment normal.              CRANIAL NERVES     CN III, IV, VI   Pupils are equal, round, and reactive to light.       Significant Labs: All pertinent labs within the past 24 hours have been reviewed.      Significant Imaging: I have reviewed all pertinent imaging results/findings within the past 24 hours.  Assessment/Plan:     * COVID-19 virus infection  Patient is identified as Severe COVID-19 based on hypoxemia with O2 saturations <94% on room air or on ambulation   Initiate standard COVID protocols; COVID-19 testing ,Infection Control notification  and isolation- respiratory, contact and droplet per protocol    Diagnostics: CBC, CMP, Ferritin, CRP, and Portable CXR    Management: Maintain oxygen saturations 92-96% via Nasal Cannula  LPM and monitor with continuous/intermittent pulse oximetry.  and Inhaled bronchodilators as needed for shortness of breath.    Advance Care Planning Current advance care plan has been discussed with patient/family/POA and patient currently wishes Full Code.     Acute respiratory failure with hypoxia due to COVID-19 infection    -admitted to the medical floor  -patient placed on COVID order set  -IV Rocephin and Zithromax  -Decadron 6 mg IV daily  -consider remdesivir  -chest x-ray showed no acute changes  -keep oxygen saturation greater than 92%    Myasthenia gravis  -continue home medications  -follow-up with outpatient Neurology Clinic      Cardiac murmur  -patient is followed by outpatient Cardiology Clinic  -patient had echo November 30, 2020 EF 55% with mild aortic stenosis  -Lexiscan nuclear stress test December  21, 2023, no acute ischemic changes.  Possible old inferobasal wall MI, versus artifact        Hypertension  Chronic, controlled. Latest blood pressure and vitals reviewed-     Temp:  [98.5 °F (36.9 °C)-101.6 °F (38.7 °C)]   Pulse:  [80-88]   Resp:  [20-26]   BP: ()/(60-72)   SpO2:  [91 %-98 %] .   Home meds for hypertension were reviewed and noted below.   Hypertension Medications               trandolapril (MAVIK) 4 MG Tab 4 mg every morning.     verapamil (CALAN-SR) 240 MG CR tablet Take 240 mg by mouth every morning.             While in the hospital, will manage blood pressure as follows; Continue home antihypertensive regimen    Will utilize p.r.n. blood pressure medication only if patient's blood pressure greater than 180/110 and he develops symptoms such as worsening chest pain or shortness of breath.      VTE Risk Mitigation (From admission, onward)      None                       AdmissionCare    Guideline: COVID-19 - INPT, Inpatient    Based on the indications selected for the patient, the bed status of Admit to Inpatient was determined to be MET    The following indications were selected as present at the time of evaluation of the patient:      Severe pulmonary manifestation, as indicated by 1 or more of the following:   -     - New room air oxygen saturation (SaO2) less than 94% due (at least in part) to COVID-19 infection (eg, not solely from underlying COPD, not patient baseline)    AdmissionCare documentation entered by: Mallory Awan    [facility] [employer], 27th edition, Copyright © 2023 [facility] [employer] All Rights Reserved.  [id number]:07:11-06:00    Killian Gutierrez MD  Department of Hospital Medicine

## 2023-12-30 NOTE — ASSESSMENT & PLAN NOTE
Chronic, controlled. Latest blood pressure and vitals reviewed-     Temp:  [98.5 °F (36.9 °C)-101.6 °F (38.7 °C)]   Pulse:  [80-88]   Resp:  [20-26]   BP: ()/(60-72)   SpO2:  [91 %-98 %] .   Home meds for hypertension were reviewed and noted below.   Hypertension Medications               trandolapril (MAVIK) 4 MG Tab 4 mg every morning.     verapamil (CALAN-SR) 240 MG CR tablet Take 240 mg by mouth every morning.             While in the hospital, will manage blood pressure as follows; Continue home antihypertensive regimen    Will utilize p.r.n. blood pressure medication only if patient's blood pressure greater than 180/110 and he develops symptoms such as worsening chest pain or shortness of breath.

## 2023-12-30 NOTE — ASSESSMENT & PLAN NOTE
-patient is followed by outpatient Cardiology Clinic  -patient had echo November 30, 2020 EF 55% with mild aortic stenosis  -Lexiscan nuclear stress test December 21, 2023, no acute ischemic changes.  Possible old inferobasal wall MI, versus artifact

## 2023-12-30 NOTE — ADMISSIONCARE
AdmissionCare    Guideline: COVID-19 - INPT, Inpatient    Based on the indications selected for the patient, the bed status of Admit to Inpatient was determined to be MET    The following indications were selected as present at the time of evaluation of the patient:      Severe pulmonary manifestation, as indicated by 1 or more of the following:   -     - New room air oxygen saturation (SaO2) less than 94% due (at least in part) to COVID-19 infection (eg, not solely from underlying COPD, not patient baseline)    AdmissionCare documentation entered by: Mallory Awan    [facility], 27th edition, Copyright © 2023 [facility], [facility] All Rights Reserved.  [id number]:07:11-06:00

## 2023-12-30 NOTE — ED PROVIDER NOTES
Encounter Date: 12/30/2023    SCRIBE #1 NOTE: I, Nga Smart, am scribing for, and in the presence of,  Chuck Zarco MD.       History     Chief Complaint   Patient presents with    URI     Nasal congestion, fever and weakness for 3 weeks   Temp max at home of 101     Sharita Gonzalez is a 78 y.o. male, with a PMHx of HTN, myasthenia gravis, subarachnoid hemorrhage, tobacco use, heart murmur, prostate and rectal cancer, and PSHx of carpal tunnel release, rotator cuff surgery, brain surgery, who presents to the ED with URI symptoms x3 weeks. Patient reports congestion, productive cough w/ green mucus, rhinorrhea w/ yellow nasal discharge, fever (temp of 101F last night) and weakness. States his fever was so bad he couldn't walk this AM which prompted him to ED. Attempted Tx last night with 325 mg acetaminophen. No known sick contact. Reports stress test done last week with no acute abnormalities noted. Denies Hx of heart failure. Patient denies EtOH, tobacco, or illicit drug use. Reports allergy to tape. No other exacerbating or alleviating factors. Denies otalgia, sore throat, nausea, vomiting, CP, SOB or other associated symptoms. Wife at bedside.     esting    12/21/23 NST  Lexiscan MPI stress test   No chest pain reported   + dyspnea   No ST changes   No arrhythmia   PERFUSION SCAN   inferobasal infarct   MIBI   inferobasal hypokinesis   EF 54%     Lexiscan MPI stress test -   suggestion inferobasal infarction   no ischemia evident     11/30/23 Echo  NSR 54 bpm   Normal right heart size   Mild TR / PAsys ~ 26 mmHg   LA appears mildly enlarged / LAD 48 mm, Robin 40 cc/m2   Mild MAC / minimal MR   LVH (mild) with EF ~ 55%   Diastolic dysfunction / grade I with elevation LVEDP   Aortic annulus / root / valve sclerosis   Sclerotic aortic valve / three leaflet   Small systolic aortic valve gradient   AVG mean 10 mmHg / peak 17 mmHg   AV peak velocity 2.0 m/sec   No AR detected   No pericardial effusion     November 9,  2023 EKG sinus rhythm 59, LVH with minor QRS widening, mild ST and T-wave changes    October 20, 2023 chest x-ray PA and lateral  Atherosclerotic calcification of the aorta   Cardiac silhouette normal   Mild degenerative changes thoracic spine   No acute pulmonary disease    February 8, 2019 CT chest with without contrast   6 x 3 mm nodule lateral segment right middle lobe  Follow-up if risk factors  Calcified plaque thoracic aorta and coronary arteries  CT reviewed-heavy calcium particularly proximal mid LAD  Calcified aortic annulus and valve       The history is provided by the patient. No  was used.     Review of patient's allergies indicates:   Allergen Reactions    Statins-hmg-coa reductase inhibitors      Back ache with Lipitor     Adhesive Rash     Pulls skin off    May use paper tape    Latex, natural rubber Rash     Past Medical History:   Diagnosis Date    Carpal tunnel syndrome     Hypertension     Polymyalgia     Prostate cancer      Past Surgical History:   Procedure Laterality Date    BRAIN SURGERY  age 27    subarrachnoid brain hemorhage     CARPAL TUNNEL RELEASE Right 7/9/2018    Procedure: RELEASE, CARPAL TUNNEL - RIGHT;  Surgeon: Christine Simpson MD;  Location: [facility];  Service: Orthopedics;  Laterality: Right;  Stretcher; Supine; Hand pan 1 & Pan 2    CATARACT EXTRACTION      EYE SURGERY      FINGER SURGERY  9-17-13    left TFR    HEMORRHOID SURGERY      radiation seeds   2008    prostate cancer    SHOULDER SURGERY Right     rotator     Family History   Problem Relation Age of Onset    Anesthesia problems Neg Hx     Broken bones Neg Hx     Cancer Neg Hx     Clotting disorder Neg Hx     Collagen disease Neg Hx     Diabetes Neg Hx     Dislocations Neg Hx     Osteoporosis Neg Hx     Rheumatologic disease Neg Hx     Scoliosis Neg Hx     Severe sprains Neg Hx      Social History     Tobacco Use    Smoking status: Light Smoker     Types: Cigars    Smokeless tobacco: Never     Tobacco comments:     smokes a cigar once a month   Substance Use Topics    Alcohol use: No     Comment: occasional beer     Drug use: No     Review of Systems   Constitutional:  Positive for fever.   HENT:  Positive for congestion and rhinorrhea. Negative for ear pain and sore throat.    Eyes:  Negative for pain and visual disturbance.   Respiratory:  Positive for cough. Negative for chest tightness and shortness of breath.    Cardiovascular:  Negative for chest pain.   Gastrointestinal:  Negative for nausea and vomiting.   Endocrine: Negative for polydipsia and polyuria.   Genitourinary:  Negative for dysuria and flank pain.   Musculoskeletal:  Negative for back pain, neck pain and neck stiffness.   Skin:  Negative for rash.   Allergic/Immunologic: Negative for immunocompromised state.   Neurological:  Positive for weakness. Negative for dizziness.   Hematological:  Does not bruise/bleed easily.   Psychiatric/Behavioral:  Negative for agitation and behavioral problems.    All other systems reviewed and are negative.      Physical Exam     Initial Vitals   BP Pulse Resp Temp SpO2   12/30/23 1401 12/30/23 1359 12/30/23 1359 12/30/23 1359 12/30/23 1401   91/72 86 20 98.5 °F (36.9 °C) (!) 91 %      MAP       --                Physical Exam    Nursing note and vitals reviewed.  Constitutional: He appears well-developed and well-nourished.   HENT:   Head: Normocephalic and atraumatic.   Right Ear: Tympanic membrane normal.   Left Ear: Tympanic membrane normal.   Mouth/Throat: Oropharynx is clear and moist.   Postnasal drip.    Eyes: EOM are normal. Pupils are equal, round, and reactive to light.   Neck:   Normal range of motion.  Cardiovascular:  Normal rate and regular rhythm.           Pulmonary/Chest: No stridor. He has decreased breath sounds (bilaterally). He has wheezes (bilaterally).   Ambulatory SpO2 92% before interventions.    Abdominal: Abdomen is soft. Bowel sounds are normal. He exhibits no distension.  There is no abdominal tenderness.   Musculoskeletal:         General: No tenderness or edema. Normal range of motion.      Cervical back: Normal range of motion.     Neurological: He is alert and oriented to person, place, and time. He has normal strength. GCS score is 15. GCS eye subscore is 4. GCS verbal subscore is 5. GCS motor subscore is 6.   Skin: Skin is warm and dry. Capillary refill takes less than 2 seconds.   Psychiatric: He has a normal mood and affect. Thought content normal.         ED Course   Procedures  Labs Reviewed   SARS-COV-2 RDRP GENE - Abnormal; Notable for the following components:       Result Value    POC Rapid COVID Positive (*)     All other components within normal limits    Narrative:     This test utilizes isothermal nucleic acid amplification technology to detect the SARS-CoV-2 RdRp nucleic acid segment. The analytical sensitivity (limit of detection) is 500 copies/swab.     A POSITIVE result is indicative of the presence of SARS-CoV-2 RNA; clinical correlation with patient history and other diagnostic information is necessary to determine patient infection status.    A NEGATIVE result means that SARS-CoV-2 nucleic acids are not present above the limit of detection. A NEGATIVE result should be treated as presumptive. It does not rule out the possibility of COVID-19 and should not be the sole basis for treatment decisions. If COVID-19 is strongly suspected based on clinical and exposure history, re-testing using an alternate molecular assay should be considered.     This test is only for use under the Food and Drug Administration s Emergency Use Authorization (EUA).     Commercial kits are provided by [employer]. Performance characteristics of the EUA have been independently verified by [facility] Department of Pathology and Laboratory Medicine.   _________________________________________________________________   The authorized Fact Sheet for Healthcare Providers  and the authorized Fact Sheet for Patients of the ID NOW COVID-19 are available on the FDA website:    https://www.fda.gov/media/147872/download      https://www.fda.gov/media/264834/download      ISTAT PROCEDURE - Abnormal; Notable for the following components:    POC PCO2 54.8 (*)     POC PO2 23 (*)     POC HCO3 32.2 (*)     POC BE 5 (*)     POC Lactate 2.27 (*)     POC TCO2 34 (*)     All other components within normal limits   POCT CMP - Abnormal; Notable for the following components:    POC Chloride 96 (*)     Protein, POC 6.3 (*)     All other components within normal limits   CULTURE, BLOOD   CULTURE, BLOOD   C-REACTIVE PROTEIN   FERRITIN   CK   PROCALCITONIN   POCT CBC   POCT INFLUENZA A/B MOLECULAR   POCT CMP   POCT RAPID INFLUENZA A/B   POCT TROPONIN   POCT B-TYPE NATRIURETIC PEPTIDE (BNP)          Imaging Results              X-Ray Chest PA And Lateral (Final result)  Result time 12/30/23 15:39:13      Final result by Jono Chew MD (12/30/23 15:39:13)                   Impression:      No acute cardiopulmonary process.      Electronically signed by: Jono Chew MD  Date:    12/30/2023  Time:    15:39               Narrative:    EXAMINATION:  XR CHEST PA AND LATERAL    CLINICAL HISTORY:  Hypoxemia    TECHNIQUE:  PA and lateral views of the chest were performed.    COMPARISON:  None.    FINDINGS:  There is no consolidation, effusion, or pneumothorax.  Cardiomediastinal silhouette is unremarkable.  Regional osseous structures are unremarkable.                                       Medications   dexAMETHasone injection 8 mg (has no administration in time range)   albuterol-ipratropium 2.5 mg-0.5 mg/3 mL nebulizer solution 3 mL (3 mLs Nebulization Given 12/30/23 1440)   lactated ringers bolus 1,845 mL (1,845 mLs Intravenous New Bag 12/30/23 1511)     Medical Decision Making  Differential diagnosis include but are not limited to: covid/flu/pna/bronchitis    Per chart review, patient had nuclear stress  test done on 12/21/2023 with conclusion as follows:    Resting ECG:     Sinus rhythm, normal ECG. 1mm downsloping  at baseline   Stress ECG:      No arrhythmias. 1mm downsloping during stress and 1mm downsloping in recovery.     Lexiscan MPI stress test   No chest pain reported   + dyspnea   No ST changes   No arrhythmia     PERFUSION SCAN   inferobasal infarct     MIBI   inferobasal hypokinesis   EF 54%     Lexiscan MPI stress test  -       suggestion inferobasal infarction      no ischemia evident     No prior study for comparison.     78-year-old male presenting secondary to 3 weeks of URI type symptoms but then things acutely worsened over the last 24 hours with new fever and new weakness and increase in symptoms.  Patient is able to ambulate here.  Afebrile here.  O2 sats and low 90s.  Patient given breathing treatments.  COVID positive.  Given fluids.  Patient was reassessed and O2 sat 89%.  Placed on 2 L.  O2 sats 94 95%.  I was able to review old O2 levels where he was normally between 94 96%.  Not on home oxygen.  Admitted further workup management.  Patient states he has had no adverse effects secondary to steroid usage.  Has had prednisone and steroids on a regular basis.    Please put in 35 minutes of critical care due to patient having a high risk of respriatory failure.   Separate from teaching and exclusive of procedure and ekg time  Includes:  Time at bedside  Time reviewing test results  Time discussing case with staff  Time documenting the medical record  Time spent with family members  Time spent with consults  Management      Amount and/or Complexity of Data Reviewed  External Data Reviewed: notes.     Details: I decided to obtain old medical records. See MDM.   Labs: ordered.    Risk  Prescription drug management.  Decision regarding hospitalization.            Scribe Attestation:   Scribe #1: I performed the above scribed service and the documentation accurately describes the services I  performed. I attest to the accuracy of the note.                         I, chuck zarco, personally performed the services described in this documentation.  All medical record entries made by the scribe were at my direction and in my presence.  I have reviewed the chart and agree that the record reflects my personal performance and is accurate and complete.        Clinical Impression:  Final diagnoses:  [R09.02] Hypoxia  [R06.02] SOB (shortness of breath)  [U07.1] COVID-19 (Primary)          ED Disposition Condition    Admit                 Chuck Zarco MD  12/30/23 [id number]

## 2023-12-30 NOTE — HPI
This is an 78-year-old male with a past medical history of hypertension,,SAH, PMR, Myasthenia gravis, history of prostate cancer, rectal cancer, tobacco use who presents with fevers.     Patient presents with fevers that started a day prior to presentation. He reports nasal congestion, rhinorrhea, and productive cough with green sputum over the last 3 weeks. He denies sick contacts.     In the ED, the patient was febrile (Tmax:  38.7), tachypneic, and saturating 91% on room air.  Labs were remarkable for positive COVID-19, elevated BNP (888), elevated troponin (0.18), elevated lactic acid (2.27 > 1.88) elevated CRP (62.7).  Chest x-ray showed no acute process.  He was given LR 1.8 L, Decadron 8 mg IV, DuoNeb x3 and Tylenol 1 g.  Patient was admitted for further management.

## 2023-12-30 NOTE — SUBJECTIVE & OBJECTIVE
Past Medical History:   Diagnosis Date    Carpal tunnel syndrome     Hypertension     Polymyalgia     Prostate cancer        Past Surgical History:   Procedure Laterality Date    BRAIN SURGERY  age 27    subarrachnoid brain hemorhage     CARPAL TUNNEL RELEASE Right 7/9/2018    Procedure: RELEASE, CARPAL TUNNEL - RIGHT;  Surgeon: Christine Simpson MD;  Location: [facility];  Service: Orthopedics;  Laterality: Right;  Stretcher; Supine; Hand pan 1 & Pan 2    CATARACT EXTRACTION      EYE SURGERY      FINGER SURGERY  9-17-13    left TFR    HEMORRHOID SURGERY      radiation seeds   2008    prostate cancer    SHOULDER SURGERY Right     rotator       Review of patient's allergies indicates:   Allergen Reactions    Statins-hmg-coa reductase inhibitors      Back ache with Lipitor     Adhesive Rash     Pulls skin off    May use paper tape    Latex, natural rubber Rash       No current facility-administered medications on file prior to encounter.     Current Outpatient Medications on File Prior to Encounter   Medication Sig    aspirin (ECOTRIN) 81 MG EC tablet Take 81 mg by mouth once daily.    diclofenac sodium (VOLTAREN) 1 % Gel Apply 2 g topically once daily.    gabapentin (NEURONTIN) 100 MG capsule Take 300 mg by mouth every evening.     meloxicam (MOBIC) 15 MG tablet Take 1 tablet (15 mg total) by mouth once daily.    multivitamin capsule Take 1 capsule by mouth once daily.    predniSONE (DELTASONE) 1 MG tablet Take 4 tablets (4 mg total) by mouth once daily.    predniSONE (DELTASONE) 5 MG tablet Take 2 tablets (10 mg total) by mouth once daily. (Patient taking differently: Take 20 mg by mouth once daily.)    trandolapril (MAVIK) 4 MG Tab 4 mg every morning.     venlafaxine (EFFEXOR-XR) 75 MG 24 hr capsule Take 75 mg by mouth.    verapamil (CALAN-SR) 240 MG CR tablet Take 240 mg by mouth every morning.      Family History    None       Tobacco Use    Smoking status: Light Smoker     Types: Cigars    Smokeless tobacco:  Never    Tobacco comments:     smokes a cigar once a month   Substance and Sexual Activity    Alcohol use: No     Comment: occasional beer     Drug use: No    Sexual activity: Not on file     Review of Systems   Constitutional:  Positive for chills and fever.   HENT:  Positive for congestion.    Eyes: Negative.    Respiratory:  Positive for cough and shortness of breath.    Cardiovascular: Negative.    Gastrointestinal: Negative.    Endocrine: Negative.    Genitourinary: Negative.    Musculoskeletal: Negative.    Skin: Negative.    Allergic/Immunologic: Negative.    Neurological: Negative.    Hematological: Negative.    Psychiatric/Behavioral: Negative.       Objective:     Vital Signs (Most Recent):  Temp: (!) 101.6 °F (38.7 °C) (12/30/23 1646)  Pulse: 88 (12/30/23 1646)  Resp: (!) 24 (12/30/23 1646)  BP: 133/60 (12/30/23 1646)  SpO2: 98 % (12/30/23 1646) Vital Signs (24h Range):  Temp:  [98.5 °F (36.9 °C)-101.6 °F (38.7 °C)] 101.6 °F (38.7 °C)  Pulse:  [80-88] 88  Resp:  [20-26] 24  SpO2:  [91 %-98 %] 98 %  BP: ()/(60-72) 133/60     Weight: 84.8 kg (187 lb)  Body mass index is 31.12 kg/m².     Physical Exam  Vitals and nursing note reviewed.   Constitutional:       General: He is not in acute distress.     Appearance: Normal appearance. He is not ill-appearing.   HENT:      Head: Normocephalic and atraumatic.      Nose: Nose normal.      Mouth/Throat:      Mouth: Mucous membranes are moist.   Eyes:      Extraocular Movements: Extraocular movements intact.   Cardiovascular:      Rate and Rhythm: Normal rate.      Pulses: Normal pulses.      Heart sounds: No murmur heard.  Pulmonary:      Effort: Pulmonary effort is normal. No respiratory distress.   Abdominal:      General: Abdomen is flat.      Palpations: Abdomen is soft.      Tenderness: There is no abdominal tenderness.   Musculoskeletal:      Right lower leg: No edema.      Left lower leg: No edema.   Skin:     General: Skin is warm.      Capillary  Refill: Capillary refill takes less than 2 seconds.   Neurological:      General: No focal deficit present.      Mental Status: He is alert.   Psychiatric:         Mood and Affect: Mood normal.                Significant Labs: All pertinent labs within the past 24 hours have been reviewed.      Significant Imaging: I have reviewed all pertinent imaging results/findings within the past 24 hours.

## 2023-12-30 NOTE — ED NOTES
Pt ambulated on rm. Air and O2 sat dropped to 89, he was placed on O2 per NC at 2L and sats are at 96%

## 2023-12-30 NOTE — ASSESSMENT & PLAN NOTE
Patient is identified as Severe COVID-19 based on hypoxemia with O2 saturations <94% on room air or on ambulation   Initiate standard COVID protocols; COVID-19 testing ,Infection Control notification  and isolation- respiratory, contact and droplet per protocol    Diagnostics: CBC, CMP, Ferritin, CRP, and Portable CXR    Management: Maintain oxygen saturations 92-96% via Nasal Cannula  LPM and monitor with continuous/intermittent pulse oximetry.  and Inhaled bronchodilators as needed for shortness of breath.      Acute respiratory failure with hypoxia due to COVID-19 infection    -admitted to the medical floor  -patient placed on COVID order set  -Decadron 6 mg IV daily  -Remdesivir

## 2023-12-31 LAB
ALBUMIN SERPL BCP-MCNC: 2.7 G/DL (ref 3.5–5.2)
ALP SERPL-CCNC: 39 U/L (ref 55–135)
ALT SERPL W/O P-5'-P-CCNC: 14 U/L (ref 10–44)
ANION GAP SERPL CALC-SCNC: 13 MMOL/L (ref 8–16)
AST SERPL-CCNC: 16 U/L (ref 10–40)
BASOPHILS # BLD AUTO: 0.02 K/UL (ref 0–0.2)
BASOPHILS NFR BLD: 0.3 % (ref 0–1.9)
BILIRUB SERPL-MCNC: 0.8 MG/DL (ref 0.1–1)
BNP SERPL-MCNC: 1070 PG/ML (ref 0–99)
BUN SERPL-MCNC: 19 MG/DL (ref 8–23)
CALCIUM SERPL-MCNC: 8.3 MG/DL (ref 8.7–10.5)
CHLORIDE SERPL-SCNC: 104 MMOL/L (ref 95–110)
CO2 SERPL-SCNC: 25 MMOL/L (ref 23–29)
CREAT SERPL-MCNC: 1.1 MG/DL (ref 0.5–1.4)
D DIMER PPP IA.FEU-MCNC: 1.7 MG/L FEU
DIFFERENTIAL METHOD BLD: ABNORMAL
EOSINOPHIL # BLD AUTO: 0 K/UL (ref 0–0.5)
EOSINOPHIL NFR BLD: 0 % (ref 0–8)
ERYTHROCYTE [DISTWIDTH] IN BLOOD BY AUTOMATED COUNT: 14.2 % (ref 11.5–14.5)
EST. GFR  (NO RACE VARIABLE): >60 ML/MIN/1.73 M^2
FERRITIN SERPL-MCNC: 365 NG/ML (ref 20–300)
GLUCOSE SERPL-MCNC: 199 MG/DL (ref 70–110)
HCT VFR BLD AUTO: 36.9 % (ref 40–54)
HGB BLD-MCNC: 11.8 G/DL (ref 14–18)
IMM GRANULOCYTES # BLD AUTO: 0.15 K/UL (ref 0–0.04)
IMM GRANULOCYTES NFR BLD AUTO: 1.9 % (ref 0–0.5)
LYMPHOCYTES # BLD AUTO: 0.4 K/UL (ref 1–4.8)
LYMPHOCYTES NFR BLD: 4.6 % (ref 18–48)
MAGNESIUM SERPL-MCNC: 2.1 MG/DL (ref 1.6–2.6)
MCH RBC QN AUTO: 31.6 PG (ref 27–31)
MCHC RBC AUTO-ENTMCNC: 32 G/DL (ref 32–36)
MCV RBC AUTO: 99 FL (ref 82–98)
MONOCYTES # BLD AUTO: 0.2 K/UL (ref 0.3–1)
MONOCYTES NFR BLD: 2.2 % (ref 4–15)
NEUTROPHILS # BLD AUTO: 7.2 K/UL (ref 1.8–7.7)
NEUTROPHILS NFR BLD: 91 % (ref 38–73)
NRBC BLD-RTO: 0 /100 WBC
PHOSPHATE SERPL-MCNC: 4.3 MG/DL (ref 2.7–4.5)
PLATELET # BLD AUTO: 214 K/UL (ref 150–450)
PMV BLD AUTO: 10.9 FL (ref 9.2–12.9)
POCT GLUCOSE: 105 MG/DL (ref 70–110)
POCT GLUCOSE: 125 MG/DL (ref 70–110)
POCT GLUCOSE: 129 MG/DL (ref 70–110)
POTASSIUM SERPL-SCNC: 4.2 MMOL/L (ref 3.5–5.1)
PROT SERPL-MCNC: 5.3 G/DL (ref 6–8.4)
RBC # BLD AUTO: 3.74 M/UL (ref 4.6–6.2)
SODIUM SERPL-SCNC: 142 MMOL/L (ref 136–145)
TROPONIN I SERPL DL<=0.01 NG/ML-MCNC: 0.17 NG/ML (ref 0–0.03)
WBC # BLD AUTO: 7.87 K/UL (ref 3.9–12.7)

## 2023-12-31 PROCEDURE — 85379 FIBRIN DEGRADATION QUANT: CPT | Performed by: INTERNAL MEDICINE

## 2023-12-31 PROCEDURE — 83880 ASSAY OF NATRIURETIC PEPTIDE: CPT | Performed by: STUDENT IN AN ORGANIZED HEALTH CARE EDUCATION/TRAINING PROGRAM

## 2023-12-31 PROCEDURE — 25000003 PHARM REV CODE 250: Performed by: INTERNAL MEDICINE

## 2023-12-31 PROCEDURE — 63600175 PHARM REV CODE 636 W HCPCS: Performed by: STUDENT IN AN ORGANIZED HEALTH CARE EDUCATION/TRAINING PROGRAM

## 2023-12-31 PROCEDURE — 36415 COLL VENOUS BLD VENIPUNCTURE: CPT | Performed by: STUDENT IN AN ORGANIZED HEALTH CARE EDUCATION/TRAINING PROGRAM

## 2023-12-31 PROCEDURE — 36415 COLL VENOUS BLD VENIPUNCTURE: CPT | Mod: XB | Performed by: INTERNAL MEDICINE

## 2023-12-31 PROCEDURE — 27000207 HC ISOLATION

## 2023-12-31 PROCEDURE — 25000003 PHARM REV CODE 250: Performed by: STUDENT IN AN ORGANIZED HEALTH CARE EDUCATION/TRAINING PROGRAM

## 2023-12-31 PROCEDURE — 84100 ASSAY OF PHOSPHORUS: CPT | Performed by: STUDENT IN AN ORGANIZED HEALTH CARE EDUCATION/TRAINING PROGRAM

## 2023-12-31 PROCEDURE — 83036 HEMOGLOBIN GLYCOSYLATED A1C: CPT | Performed by: INTERNAL MEDICINE

## 2023-12-31 PROCEDURE — 83735 ASSAY OF MAGNESIUM: CPT | Performed by: STUDENT IN AN ORGANIZED HEALTH CARE EDUCATION/TRAINING PROGRAM

## 2023-12-31 PROCEDURE — 84484 ASSAY OF TROPONIN QUANT: CPT | Performed by: STUDENT IN AN ORGANIZED HEALTH CARE EDUCATION/TRAINING PROGRAM

## 2023-12-31 PROCEDURE — 63600175 PHARM REV CODE 636 W HCPCS: Performed by: INTERNAL MEDICINE

## 2023-12-31 PROCEDURE — 85025 COMPLETE CBC W/AUTO DIFF WBC: CPT | Performed by: STUDENT IN AN ORGANIZED HEALTH CARE EDUCATION/TRAINING PROGRAM

## 2023-12-31 PROCEDURE — 21400001 HC TELEMETRY ROOM

## 2023-12-31 PROCEDURE — 80053 COMPREHEN METABOLIC PANEL: CPT | Performed by: STUDENT IN AN ORGANIZED HEALTH CARE EDUCATION/TRAINING PROGRAM

## 2023-12-31 RX ORDER — LOSARTAN POTASSIUM 25 MG/1
100 TABLET ORAL DAILY
Status: DISCONTINUED | OUTPATIENT
Start: 2023-12-31 | End: 2024-01-03 | Stop reason: HOSPADM

## 2023-12-31 RX ORDER — HYDRALAZINE HYDROCHLORIDE 20 MG/ML
10 INJECTION INTRAMUSCULAR; INTRAVENOUS EVERY 6 HOURS PRN
Status: DISCONTINUED | OUTPATIENT
Start: 2023-12-31 | End: 2024-01-01

## 2023-12-31 RX ORDER — VERAPAMIL HYDROCHLORIDE 120 MG/1
240 TABLET, FILM COATED, EXTENDED RELEASE ORAL DAILY
Status: DISCONTINUED | OUTPATIENT
Start: 2023-12-31 | End: 2024-01-03 | Stop reason: HOSPADM

## 2023-12-31 RX ORDER — LOPERAMIDE HYDROCHLORIDE 2 MG/1
2 CAPSULE ORAL 2 TIMES DAILY
Status: DISCONTINUED | OUTPATIENT
Start: 2023-12-31 | End: 2024-01-03 | Stop reason: HOSPADM

## 2023-12-31 RX ORDER — MUPIROCIN 20 MG/G
OINTMENT TOPICAL 2 TIMES DAILY
Status: DISCONTINUED | OUTPATIENT
Start: 2023-12-31 | End: 2024-01-03 | Stop reason: HOSPADM

## 2023-12-31 RX ADMIN — MUPIROCIN: 20 OINTMENT TOPICAL at 09:12

## 2023-12-31 RX ADMIN — ENOXAPARIN SODIUM 90 MG: 100 INJECTION SUBCUTANEOUS at 09:12

## 2023-12-31 RX ADMIN — CEFTRIAXONE 1 G: 1 INJECTION, POWDER, FOR SOLUTION INTRAMUSCULAR; INTRAVENOUS at 09:12

## 2023-12-31 RX ADMIN — LOPERAMIDE HYDROCHLORIDE 2 MG: 2 CAPSULE ORAL at 09:12

## 2023-12-31 RX ADMIN — HYDRALAZINE HYDROCHLORIDE 10 MG: 20 INJECTION, SOLUTION INTRAMUSCULAR; INTRAVENOUS at 06:12

## 2023-12-31 RX ADMIN — REMDESIVIR 100 MG: 100 INJECTION, POWDER, LYOPHILIZED, FOR SOLUTION INTRAVENOUS at 10:12

## 2023-12-31 RX ADMIN — DEXAMETHASONE 6 MG: 2 TABLET ORAL at 09:12

## 2023-12-31 RX ADMIN — VERAPAMIL HYDROCHLORIDE 240 MG: 120 TABLET, FILM COATED, EXTENDED RELEASE ORAL at 09:12

## 2023-12-31 RX ADMIN — OXYCODONE HYDROCHLORIDE AND ACETAMINOPHEN 500 MG: 500 TABLET ORAL at 09:12

## 2023-12-31 RX ADMIN — LOSARTAN POTASSIUM 100 MG: 25 TABLET, FILM COATED ORAL at 09:12

## 2023-12-31 RX ADMIN — LOPERAMIDE HYDROCHLORIDE 2 MG: 2 CAPSULE ORAL at 12:12

## 2023-12-31 RX ADMIN — THERA TABS 1 TABLET: TAB at 09:12

## 2023-12-31 NOTE — SUBJECTIVE & OBJECTIVE
Interval History:  Patient seen today for follow-up care.  Patient was admitted for acute respiratory failure with hypoxia due to COVID-19 infection.  Patient placed on droplet and contact precautions, remdesivir, Decadron, low-flow oxygen, and will add Rocephin 1 g q.day.    Review of Systems   Constitutional:  Positive for fatigue and fever.   HENT: Negative.     Eyes: Negative.    Respiratory:  Positive for cough.    Cardiovascular: Negative.    Gastrointestinal: Negative.    Endocrine: Negative.    Genitourinary: Negative.    Musculoskeletal: Negative.    Skin: Negative.    Allergic/Immunologic: Negative.    Neurological: Negative.    Hematological: Negative.    Psychiatric/Behavioral: Negative.       Objective:     Vital Signs (Most Recent):  Temp: 97.3 °F (36.3 °C) (12/31/23 0730)  Pulse: 79 (12/31/23 0730)  Resp: 18 (12/31/23 0730)  BP: (!) 198/95 (12/31/23 0937)  SpO2: 98 % (12/31/23 0730) Vital Signs (24h Range):  Temp:  [97.3 °F (36.3 °C)-101.6 °F (38.7 °C)] 97.3 °F (36.3 °C)  Pulse:  [60-94] 79  Resp:  [18-26] 18  SpO2:  [91 %-98 %] 98 %  BP: ()/(60-95) 198/95     Weight: 86.8 kg (191 lb 4.8 oz)  Body mass index is 31.83 kg/m².    Intake/Output Summary (Last 24 hours) at 12/31/2023 1003  Last data filed at 12/31/2023 0930  Gross per 24 hour   Intake 2325 ml   Output --   Net 2325 ml         Physical Exam  Vitals reviewed.   Constitutional:       Appearance: Normal appearance. He is normal weight.   HENT:      Head: Normocephalic and atraumatic.      Right Ear: External ear normal.      Left Ear: External ear normal.      Nose: Nose normal.      Mouth/Throat:      Mouth: Mucous membranes are moist.      Pharynx: Oropharynx is clear.   Eyes:      Extraocular Movements: Extraocular movements intact.      Conjunctiva/sclera: Conjunctivae normal.      Pupils: Pupils are equal, round, and reactive to light.   Cardiovascular:      Rate and Rhythm: Normal rate and regular rhythm.      Pulses: Normal pulses.  "     Heart sounds: Normal heart sounds.   Pulmonary:      Effort: Pulmonary effort is normal.      Breath sounds: Normal breath sounds.   Abdominal:      General: Abdomen is flat. Bowel sounds are normal.      Palpations: Abdomen is soft.   Musculoskeletal:         General: Normal range of motion.      Cervical back: Normal range of motion and neck supple.   Skin:     General: Skin is warm.      Capillary Refill: Capillary refill takes less than 2 seconds.   Neurological:      General: No focal deficit present.      Mental Status: He is alert and oriented to person, place, and time. Mental status is at baseline.      Cranial Nerves: No cranial nerve deficit.   Psychiatric:         Mood and Affect: Mood normal.         Thought Content: Thought content normal.         Judgment: Judgment normal.             Significant Labs: All pertinent labs within the past 24 hours have been reviewed.  A1C: No results for input(s): "HGBA1C" in the last 4320 hours.  Blood Culture:   Recent Labs   Lab 12/30/23  1408 12/30/23  1438   LABBLOO No Growth to date No Growth to date     BMP:   Recent Labs   Lab 12/31/23  0005   *      K 4.2      CO2 25   BUN 19   CREATININE 1.1   CALCIUM 8.3*   MG 2.1     CBC:   Recent Labs   Lab 12/31/23  0005   WBC 7.87   HGB 11.8*   HCT 36.9*        Respiratory Culture: No results for input(s): "GSRESP", "RESPIRATORYC" in the last 48 hours.  Urine Culture: No results for input(s): "LABURIN" in the last 48 hours.    Significant Imaging: I have reviewed all pertinent imaging results/findings within the past 24 hours.  "

## 2023-12-31 NOTE — NURSING
[facility] [place]  Nurses Note -- 4 Eyes      12/31/2023       Skin assessed on: Admit      [x] No Pressure Injuries Present    [x]Prevention Measures Documented    [] Yes LDA  for Pressure Injury Previously documented     [] Yes New Pressure Injury Discovered   [] LDA for New Pressure Injury Added      Attending RN:  Monserrat Cardoza RN     Second RN:  Marilyn JOHN RN

## 2023-12-31 NOTE — PLAN OF CARE
Case Management Assessment     PCP: Dawood Leo MD; prefers AM appointments  Pharmacy: [facility]  Patient Arrived From: Home  Existing Help at Home: Spouse and son  Barriers to Discharge: None    Discharge Plan:    A. Home with family; follow-ups   B. TBD    Independent at home with spouse; spouse and son can assist if needed; no assist usually needed; no current medical services; uses a cane at home as needed; no discharge needs anticipated at this time.       12/31/23 1543   Discharge Assessment   Assessment Type Discharge Planning Assessment   Confirmed/corrected address, phone number and insurance Yes   Confirmed Demographics Correct on Facesheet   Source of Information patient;health record   Communicated AILYN with patient/caregiver Date not available/Unable to determine   Reason For Admission SOB   People in Home spouse   Facility Arrived From: Home   Do you expect to return to your current living situation? Yes   Do you have help at home or someone to help you manage your care at home? Yes   Who are your caregiver(s) and their phone number(s)? Poppy-spouse: [phone]   Prior to hospitilization cognitive status: Alert/Oriented   Current cognitive status: Alert/Oriented   Walking or Climbing Stairs Difficulty no   Dressing/Bathing Difficulty no   Home Accessibility wheelchair accessible   Home Layout Able to live on 1st floor   Equipment Currently Used at Home cane, straight   Readmission within 30 days? No   Patient currently being followed by outpatient case management? No   Do you currently have service(s) that help you manage your care at home? No   Do you take prescription medications? Yes   Do you have prescription coverage? Yes   Coverage [employer]   Do you have any problems affording any of your prescribed medications? No   Is the patient taking medications as prescribed? yes   Who is going to help you get home at discharge? Poppy-spouse; Sharita-son   How do you get to  doctors appointments? car, drives self   Are you on dialysis? No   Do you take coumadin? No   Discharge Plan A Home with family  (Follow-Up)   Discharge Plan B Other  (TBD)   DME Needed Upon Discharge  other (see comments)  (TBD)   Discharge Plan discussed with: Patient   Transition of Care Barriers None     SW Role explained to patient; two patient identifiers recognized; SW contact information placed on Communication board. Discussed patient managing health care at home and discussed discharge plans A and B; determined who would be helping patient at home with recovery: Poppy, spouse and Sharita, son, will help with recovery at home.

## 2023-12-31 NOTE — HOSPITAL COURSE
Mr. Gonzalez is a pleasant 78-year-old gentleman who is admitted to the hospital for COVID-19 infection.  He presented with complaints of cough with green phlegm, fevers, congestion.  Patient's chest x-ray showed no acute infiltrates.  Patient's oxygen saturation was 89% on room air.  The patient was placed on droplet and contact precautions, remdesivir, Decadron, low-flow oxygen, and will add Rocephin 1 g q.day. patient was given hydralazine p.r.n. for hypertension, developed redness of the face, possible rash allergic reaction.  Hydralazine was discontinued and his allergy list.  Patient also had elevated D-dimer 1.70, will check CTA chest and bilateral venous Dopplers. He improved with supportive care and was found to be suitable for discharge home without oxygen.

## 2023-12-31 NOTE — PROGRESS NOTES
[facility] Medicine  Progress Note    Patient Name: Sharita Gonzalez  MRN: [id number]  Patient Class: IP- Inpatient   Admission Date: 12/30/2023  Length of Stay: 1 days  Attending Physician: Killian Gutierrez MD  Primary Care Provider: Dawood Leo MD        Subjective:     Principal Problem:COVID-19 virus infection        HPI:  This is an 78-year-old male with a past medical history of hypertension,,SAH, PMR, Myasthenia gravis, history of prostate cancer, rectal cancer, tobacco use who presents with fevers.     Patient presents with fevers that started a day prior to presentation. He reports nasal congestion, rhinorrhea, and productive cough with green sputum over the last 3 weeks. He denies sick contacts.     In the ED, the patient was febrile (Tmax:  38.7), tachypneic, and saturating 91% on room air.  Labs were remarkable for positive COVID-19, elevated BNP (888), elevated troponin (0.18), elevated lactic acid (2.27 > 1.88) elevated CRP (62.7).  Chest x-ray showed no acute process.  He was given LR 1.8 L, Decadron 8 mg IV, DuoNeb x3 and Tylenol 1 g.  Patient was admitted for further management.    Overview/Hospital Course:  Mr. Gonzalez is a pleasant 78-year-old gentleman who is admitted to the hospital for COVID-19 infection.  He presented with complaints of cough with green phlegm, fevers, congestion.  Patient's chest x-ray showed no acute infiltrates.  Patient's oxygen saturation was 89% on room air.  The patient was placed on droplet and contact precautions, remdesivir, Decadron, low-flow oxygen, and will add Rocephin 1 g q.day.    Interval History:  Patient seen today for follow-up care.  Patient was admitted for acute respiratory failure with hypoxia due to COVID-19 infection.  Patient placed on droplet and contact precautions, remdesivir, Decadron, low-flow oxygen, and will add Rocephin 1 g q.day.    Review of Systems   Constitutional:  Positive for fatigue and fever.   HENT: Negative.      Eyes: Negative.    Respiratory:  Positive for cough.    Cardiovascular: Negative.    Gastrointestinal: Negative.    Endocrine: Negative.    Genitourinary: Negative.    Musculoskeletal: Negative.    Skin: Negative.    Allergic/Immunologic: Negative.    Neurological: Negative.    Hematological: Negative.    Psychiatric/Behavioral: Negative.       Objective:     Vital Signs (Most Recent):  Temp: 97.3 °F (36.3 °C) (12/31/23 0730)  Pulse: 79 (12/31/23 0730)  Resp: 18 (12/31/23 0730)  BP: (!) 198/95 (12/31/23 0937)  SpO2: 98 % (12/31/23 0730) Vital Signs (24h Range):  Temp:  [97.3 °F (36.3 °C)-101.6 °F (38.7 °C)] 97.3 °F (36.3 °C)  Pulse:  [60-94] 79  Resp:  [18-26] 18  SpO2:  [91 %-98 %] 98 %  BP: ()/(60-95) 198/95     Weight: 86.8 kg (191 lb 4.8 oz)  Body mass index is 31.83 kg/m².    Intake/Output Summary (Last 24 hours) at 12/31/2023 1003  Last data filed at 12/31/2023 0930  Gross per 24 hour   Intake 2325 ml   Output --   Net 2325 ml         Physical Exam  Vitals reviewed.   Constitutional:       Appearance: Normal appearance. He is normal weight.   HENT:      Head: Normocephalic and atraumatic.      Right Ear: External ear normal.      Left Ear: External ear normal.      Nose: Nose normal.      Mouth/Throat:      Mouth: Mucous membranes are moist.      Pharynx: Oropharynx is clear.   Eyes:      Extraocular Movements: Extraocular movements intact.      Conjunctiva/sclera: Conjunctivae normal.      Pupils: Pupils are equal, round, and reactive to light.   Cardiovascular:      Rate and Rhythm: Normal rate and regular rhythm.      Pulses: Normal pulses.      Heart sounds: Normal heart sounds.   Pulmonary:      Effort: Pulmonary effort is normal.      Breath sounds: Normal breath sounds.   Abdominal:      General: Abdomen is flat. Bowel sounds are normal.      Palpations: Abdomen is soft.   Musculoskeletal:         General: Normal range of motion.      Cervical back: Normal range of motion and neck supple.  "  Skin:     General: Skin is warm.      Capillary Refill: Capillary refill takes less than 2 seconds.   Neurological:      General: No focal deficit present.      Mental Status: He is alert and oriented to person, place, and time. Mental status is at baseline.      Cranial Nerves: No cranial nerve deficit.   Psychiatric:         Mood and Affect: Mood normal.         Thought Content: Thought content normal.         Judgment: Judgment normal.             Significant Labs: All pertinent labs within the past 24 hours have been reviewed.  A1C: No results for input(s): "HGBA1C" in the last 4320 hours.  Blood Culture:   Recent Labs   Lab 12/30/23  1408 12/30/23  1438   LABBLOO No Growth to date No Growth to date     BMP:   Recent Labs   Lab 12/31/23  0005   *      K 4.2      CO2 25   BUN 19   CREATININE 1.1   CALCIUM 8.3*   MG 2.1     CBC:   Recent Labs   Lab 12/31/23  0005   WBC 7.87   HGB 11.8*   HCT 36.9*        Respiratory Culture: No results for input(s): "GSRESP", "RESPIRATORYC" in the last 48 hours.  Urine Culture: No results for input(s): "LABURIN" in the last 48 hours.    Significant Imaging: I have reviewed all pertinent imaging results/findings within the past 24 hours.    Assessment/Plan:      * COVID-19 virus infection  Patient is identified as Severe COVID-19 based on hypoxemia with O2 saturations <94% on room air or on ambulation   Initiate standard COVID protocols; COVID-19 testing ,Infection Control notification  and isolation- respiratory, contact and droplet per protocol    Diagnostics: CBC, CMP, Ferritin, CRP, and Portable CXR    Management: Maintain oxygen saturations 92-96% via Nasal Cannula  LPM and monitor with continuous/intermittent pulse oximetry.  and Inhaled bronchodilators as needed for shortness of breath.      Acute respiratory failure with hypoxia due to COVID-19 infection    -admitted to the medical floor  -patient placed on COVID order set  -Decadron 6 mg IV " daily  -Remdesivir    Myasthenia gravis  -Currently on decadron, continue Prednisone 15 mg on discharge   -follow-up with outpatient Neurology Clinic      Hypertension  Chronic, controlled. Latest blood pressure and vitals reviewed-     Temp:  [98.5 °F (36.9 °C)-101.6 °F (38.7 °C)]   Pulse:  [80-88]   Resp:  [20-26]   BP: ()/(60-72)   SpO2:  [91 %-98 %] .   Home meds for hypertension were reviewed and noted below.   Hypertension Medications               trandolapril (MAVIK) 4 MG Tab 4 mg every morning.     verapamil (CALAN-SR) 240 MG CR tablet Take 240 mg by mouth every morning.             While in the hospital, will manage blood pressure as follows; Continue home antihypertensive regimen    Will utilize p.r.n. blood pressure medication only if patient's blood pressure greater than 180/110 and he develops symptoms such as worsening chest pain or shortness of breath.      VTE Risk Mitigation (From admission, onward)           Ordered     enoxaparin injection 90 mg  Every 12 hours         12/30/23 2213     IP VTE HIGH RISK PATIENT  Once         12/30/23 2031     Place sequential compression device  Until discontinued         12/30/23 2031                    Discharge Planning   AILYN:      Code Status: Full Code   Is the patient medically ready for discharge?:     Reason for patient still in hospital (select all that apply): Patient unstable, Patient trending condition, Treatment, and PT / OT recommendations                     Killian Gutierrez MD  Department of Hospital Medicine   [facility] - [place] - Telemetry

## 2023-12-31 NOTE — PLAN OF CARE
Problem: Adult Inpatient Plan of Care  Goal: Plan of Care Review  Outcome: Ongoing, Progressing  Goal: Patient-Specific Goal (Individualized)  Outcome: Ongoing, Progressing  Goal: Absence of Hospital-Acquired Illness or Injury  Outcome: Ongoing, Progressing  Intervention: Identify and Manage Fall Risk  Flowsheets (Taken 12/31/2023 0608)  Safety Promotion/Fall Prevention:   assistive device/personal item within reach   side rails raised x 2  Intervention: Prevent Skin Injury  Flowsheets (Taken 12/31/2023 0608)  Body Position:   position changed independently   position maintained  Intervention: Prevent and Manage VTE (Venous Thromboembolism) Risk  Flowsheets (Taken 12/31/2023 0608)  Activity Management: Rolling - L1  Intervention: Prevent Infection  Flowsheets (Taken 12/31/2023 0608)  Infection Prevention: single patient room provided  Goal: Optimal Comfort and Wellbeing  Outcome: Ongoing, Progressing  Intervention: Monitor Pain and Promote Comfort  Flowsheets (Taken 12/31/2023 0608)  Pain Management Interventions:   relaxation techniques promoted   quiet environment facilitated  Intervention: Provide Person-Centered Care  Flowsheets (Taken 12/31/2023 0608)  Trust Relationship/Rapport:   care explained   choices provided   emotional support provided   empathic listening provided  Goal: Readiness for Transition of Care  Outcome: Ongoing, Progressing     Problem: Infection  Goal: Absence of Infection Signs and Symptoms  Outcome: Ongoing, Progressing  Intervention: Prevent or Manage Infection  Flowsheets (Taken 12/31/2023 0612)  Infection Management: aseptic technique maintained  Isolation Precautions:   precautions maintained   airborne   contact   droplet

## 2023-12-31 NOTE — H&P
[facility] Medicine  History & Physical    Patient Name: Sharita Gonzalez  MRN: [id number]  Patient Class: IP- Inpatient  Admission Date: 12/30/2023  Attending Physician: Killian Gutierrez MD   Primary Care Provider: Dawood Leo MD         Patient information was obtained from patient, spouse/SO, and ER records.     Subjective:     Principal Problem:COVID-19 virus infection    Chief Complaint:   Chief Complaint   Patient presents with    URI     Nasal congestion, fever and weakness for 3 weeks   Temp max at home of 101        HPI: This is an 78-year-old male with a past medical history of hypertension,,SAH, PMR, Myasthenia gravis, history of prostate cancer, rectal cancer, tobacco use who presents with fevers.     Patient presents with fevers that started a day prior to presentation. He reports nasal congestion, rhinorrhea, and productive cough with green sputum over the last 3 weeks. He denies sick contacts.     In the ED, the patient was febrile (Tmax:  38.7), tachypneic, and saturating 91% on room air.  Labs were remarkable for positive COVID-19, elevated BNP (888), elevated troponin (0.18), elevated lactic acid (2.27 > 1.88) elevated CRP (62.7).  Chest x-ray showed no acute process.  He was given LR 1.8 L, Decadron 8 mg IV, DuoNeb x3 and Tylenol 1 g.  Patient was admitted for further management.    Past Medical History:   Diagnosis Date    Carpal tunnel syndrome     Hypertension     Polymyalgia     Prostate cancer        Past Surgical History:   Procedure Laterality Date    BRAIN SURGERY  age 27    subarrachnoid brain hemorhage     CARPAL TUNNEL RELEASE Right 7/9/2018    Procedure: RELEASE, CARPAL TUNNEL - RIGHT;  Surgeon: Christine Simpson MD;  Location: [facility];  Service: Orthopedics;  Laterality: Right;  Stretcher; Supine; Hand pan 1 & Pan 2    CATARACT EXTRACTION      EYE SURGERY      FINGER SURGERY  9-17-13    left TFR    HEMORRHOID SURGERY      radiation seeds   2008    prostate  cancer    SHOULDER SURGERY Right     rotator       Review of patient's allergies indicates:   Allergen Reactions    Statins-hmg-coa reductase inhibitors      Back ache with Lipitor     Adhesive Rash     Pulls skin off    May use paper tape    Latex, natural rubber Rash       No current facility-administered medications on file prior to encounter.     Current Outpatient Medications on File Prior to Encounter   Medication Sig    aspirin (ECOTRIN) 81 MG EC tablet Take 81 mg by mouth once daily.    diclofenac sodium (VOLTAREN) 1 % Gel Apply 2 g topically once daily.    gabapentin (NEURONTIN) 100 MG capsule Take 300 mg by mouth every evening.     meloxicam (MOBIC) 15 MG tablet Take 1 tablet (15 mg total) by mouth once daily.    multivitamin capsule Take 1 capsule by mouth once daily.    predniSONE (DELTASONE) 1 MG tablet Take 4 tablets (4 mg total) by mouth once daily.    predniSONE (DELTASONE) 5 MG tablet Take 2 tablets (10 mg total) by mouth once daily. (Patient taking differently: Take 20 mg by mouth once daily.)    trandolapril (MAVIK) 4 MG Tab 4 mg every morning.     venlafaxine (EFFEXOR-XR) 75 MG 24 hr capsule Take 75 mg by mouth.    verapamil (CALAN-SR) 240 MG CR tablet Take 240 mg by mouth every morning.      Family History    None       Tobacco Use    Smoking status: Light Smoker     Types: Cigars    Smokeless tobacco: Never    Tobacco comments:     smokes a cigar once a month   Substance and Sexual Activity    Alcohol use: No     Comment: occasional beer     Drug use: No    Sexual activity: Not on file     Review of Systems   Constitutional:  Positive for chills and fever.   HENT:  Positive for congestion.    Eyes: Negative.    Respiratory:  Positive for cough and shortness of breath.    Cardiovascular: Negative.    Gastrointestinal: Negative.    Endocrine: Negative.    Genitourinary: Negative.    Musculoskeletal: Negative.    Skin: Negative.    Allergic/Immunologic: Negative.    Neurological: Negative.     Hematological: Negative.    Psychiatric/Behavioral: Negative.       Objective:     Vital Signs (Most Recent):  Temp: (!) 101.6 °F (38.7 °C) (12/30/23 1646)  Pulse: 88 (12/30/23 1646)  Resp: (!) 24 (12/30/23 1646)  BP: 133/60 (12/30/23 1646)  SpO2: 98 % (12/30/23 1646) Vital Signs (24h Range):  Temp:  [98.5 °F (36.9 °C)-101.6 °F (38.7 °C)] 101.6 °F (38.7 °C)  Pulse:  [80-88] 88  Resp:  [20-26] 24  SpO2:  [91 %-98 %] 98 %  BP: ()/(60-72) 133/60     Weight: 84.8 kg (187 lb)  Body mass index is 31.12 kg/m².     Physical Exam  Vitals and nursing note reviewed.   Constitutional:       General: He is not in acute distress.     Appearance: Normal appearance. He is not ill-appearing.   HENT:      Head: Normocephalic and atraumatic.      Nose: Nose normal.      Mouth/Throat:      Mouth: Mucous membranes are moist.   Eyes:      Extraocular Movements: Extraocular movements intact.   Cardiovascular:      Rate and Rhythm: Normal rate.      Pulses: Normal pulses.      Heart sounds: No murmur heard.  Pulmonary:      Effort: Pulmonary effort is normal. No respiratory distress.   Abdominal:      General: Abdomen is flat.      Palpations: Abdomen is soft.      Tenderness: There is no abdominal tenderness.   Musculoskeletal:      Right lower leg: No edema.      Left lower leg: No edema.   Skin:     General: Skin is warm.      Capillary Refill: Capillary refill takes less than 2 seconds.   Neurological:      General: No focal deficit present.      Mental Status: He is alert.   Psychiatric:         Mood and Affect: Mood normal.                Significant Labs: All pertinent labs within the past 24 hours have been reviewed.      Significant Imaging: I have reviewed all pertinent imaging results/findings within the past 24 hours.  Assessment/Plan:     * COVID-19 virus infection  Patient is identified as Severe COVID-19 based on hypoxemia with O2 saturations <94% on room air or on ambulation   Initiate standard COVID protocols;  COVID-19 testing ,Infection Control notification  and isolation- respiratory, contact and droplet per protocol    Diagnostics: CBC, CMP, Ferritin, CRP, and Portable CXR    Management: Maintain oxygen saturations 92-96% via Nasal Cannula  LPM and monitor with continuous/intermittent pulse oximetry.  and Inhaled bronchodilators as needed for shortness of breath.      Acute respiratory failure with hypoxia due to COVID-19 infection    -admitted to the medical floor  -patient placed on COVID order set  -Decadron 6 mg IV daily  -Remdesivir    Myasthenia gravis  -Currently on decadron, continue Prednisone 15 mg on discharge   -follow-up with outpatient Neurology Clinic      Hypertension  Chronic, controlled. Latest blood pressure and vitals reviewed-     Temp:  [98.5 °F (36.9 °C)-101.6 °F (38.7 °C)]   Pulse:  [80-88]   Resp:  [20-26]   BP: ()/(60-72)   SpO2:  [91 %-98 %] .   Home meds for hypertension were reviewed and noted below.   Hypertension Medications               trandolapril (MAVIK) 4 MG Tab 4 mg every morning.     verapamil (CALAN-SR) 240 MG CR tablet Take 240 mg by mouth every morning.             While in the hospital, will manage blood pressure as follows; Continue home antihypertensive regimen    Will utilize p.r.n. blood pressure medication only if patient's blood pressure greater than 180/110 and he develops symptoms such as worsening chest pain or shortness of breath.      VTE Risk Mitigation (From admission, onward)           Ordered     enoxaparin injection 90 mg  Every 12 hours         12/30/23 2213     IP VTE HIGH RISK PATIENT  Once         12/30/23 2031     Place sequential compression device  Until discontinued         12/30/23 2031                         AdmissionCare    Guideline: COVID-19 - INPT, Inpatient    Based on the indications selected for the patient, the bed status of Admit to Inpatient was determined to be MET    The following indications were selected as present at the time of  evaluation of the patient:      Severe pulmonary manifestation, as indicated by 1 or more of the following:   -     - New room air oxygen saturation (SaO2) less than 94% due (at least in part) to COVID-19 infection (eg, not solely from underlying COPD, not patient baseline)    AdmissionCare documentation entered by: Mallory Awan    [facility] [employer], 27th edition, Copyright © 2023 [facility] [employer], [facility] All Rights Reserved.  [id number]:[id number]:00    Natalio Winkler MD  Department of Hospital Medicine  [facility] [place] Tele[place]

## 2024-01-01 LAB
ANION GAP SERPL CALC-SCNC: 10 MMOL/L (ref 8–16)
BASOPHILS # BLD AUTO: 0.02 K/UL (ref 0–0.2)
BASOPHILS NFR BLD: 0.2 % (ref 0–1.9)
BUN SERPL-MCNC: 20 MG/DL (ref 8–23)
CALCIUM SERPL-MCNC: 8.5 MG/DL (ref 8.7–10.5)
CHLORIDE SERPL-SCNC: 106 MMOL/L (ref 95–110)
CO2 SERPL-SCNC: 27 MMOL/L (ref 23–29)
CREAT SERPL-MCNC: 0.8 MG/DL (ref 0.5–1.4)
CRP SERPL-MCNC: 70.6 MG/L (ref 0–8.2)
DIFFERENTIAL METHOD BLD: ABNORMAL
EOSINOPHIL # BLD AUTO: 0 K/UL (ref 0–0.5)
EOSINOPHIL NFR BLD: 0 % (ref 0–8)
ERYTHROCYTE [DISTWIDTH] IN BLOOD BY AUTOMATED COUNT: 13.8 % (ref 11.5–14.5)
EST. GFR  (NO RACE VARIABLE): >60 ML/MIN/1.73 M^2
ESTIMATED AVG GLUCOSE: 123 MG/DL (ref 68–131)
GLUCOSE SERPL-MCNC: 115 MG/DL (ref 70–110)
HBA1C MFR BLD: 5.9 % (ref 4–5.6)
HCT VFR BLD AUTO: 37.6 % (ref 40–54)
HGB BLD-MCNC: 12 G/DL (ref 14–18)
IMM GRANULOCYTES # BLD AUTO: 0.11 K/UL (ref 0–0.04)
IMM GRANULOCYTES NFR BLD AUTO: 1.3 % (ref 0–0.5)
LYMPHOCYTES # BLD AUTO: 0.6 K/UL (ref 1–4.8)
LYMPHOCYTES NFR BLD: 6.9 % (ref 18–48)
MCH RBC QN AUTO: 31.1 PG (ref 27–31)
MCHC RBC AUTO-ENTMCNC: 31.9 G/DL (ref 32–36)
MCV RBC AUTO: 97 FL (ref 82–98)
MONOCYTES # BLD AUTO: 0.5 K/UL (ref 0.3–1)
MONOCYTES NFR BLD: 5.8 % (ref 4–15)
NEUTROPHILS # BLD AUTO: 7.1 K/UL (ref 1.8–7.7)
NEUTROPHILS NFR BLD: 85.8 % (ref 38–73)
NRBC BLD-RTO: 0 /100 WBC
PLATELET # BLD AUTO: 231 K/UL (ref 150–450)
PMV BLD AUTO: 11.6 FL (ref 9.2–12.9)
POCT GLUCOSE: 102 MG/DL (ref 70–110)
POCT GLUCOSE: 124 MG/DL (ref 70–110)
POCT GLUCOSE: 128 MG/DL (ref 70–110)
POCT GLUCOSE: 169 MG/DL (ref 70–110)
POTASSIUM SERPL-SCNC: 3.9 MMOL/L (ref 3.5–5.1)
RBC # BLD AUTO: 3.86 M/UL (ref 4.6–6.2)
SODIUM SERPL-SCNC: 143 MMOL/L (ref 136–145)
WBC # BLD AUTO: 8.26 K/UL (ref 3.9–12.7)

## 2024-01-01 PROCEDURE — 97162 PT EVAL MOD COMPLEX 30 MIN: CPT | Performed by: PHYSICAL THERAPIST

## 2024-01-01 PROCEDURE — 27000207 HC ISOLATION

## 2024-01-01 PROCEDURE — 25000003 PHARM REV CODE 250: Performed by: INTERNAL MEDICINE

## 2024-01-01 PROCEDURE — 86140 C-REACTIVE PROTEIN: CPT | Performed by: INTERNAL MEDICINE

## 2024-01-01 PROCEDURE — 80048 BASIC METABOLIC PNL TOTAL CA: CPT | Performed by: INTERNAL MEDICINE

## 2024-01-01 PROCEDURE — 63600175 PHARM REV CODE 636 W HCPCS: Performed by: STUDENT IN AN ORGANIZED HEALTH CARE EDUCATION/TRAINING PROGRAM

## 2024-01-01 PROCEDURE — 63600175 PHARM REV CODE 636 W HCPCS: Performed by: INTERNAL MEDICINE

## 2024-01-01 PROCEDURE — 36415 COLL VENOUS BLD VENIPUNCTURE: CPT | Performed by: INTERNAL MEDICINE

## 2024-01-01 PROCEDURE — 85025 COMPLETE CBC W/AUTO DIFF WBC: CPT | Performed by: INTERNAL MEDICINE

## 2024-01-01 PROCEDURE — 21400001 HC TELEMETRY ROOM

## 2024-01-01 PROCEDURE — 25000003 PHARM REV CODE 250: Performed by: STUDENT IN AN ORGANIZED HEALTH CARE EDUCATION/TRAINING PROGRAM

## 2024-01-01 RX ORDER — DOXAZOSIN 1 MG/1
1 TABLET ORAL DAILY
Status: DISCONTINUED | OUTPATIENT
Start: 2024-01-01 | End: 2024-01-03 | Stop reason: HOSPADM

## 2024-01-01 RX ADMIN — DEXAMETHASONE 6 MG: 2 TABLET ORAL at 09:01

## 2024-01-01 RX ADMIN — LOSARTAN POTASSIUM 100 MG: 25 TABLET, FILM COATED ORAL at 09:01

## 2024-01-01 RX ADMIN — THERA TABS 1 TABLET: TAB at 09:01

## 2024-01-01 RX ADMIN — LOPERAMIDE HYDROCHLORIDE 2 MG: 2 CAPSULE ORAL at 09:01

## 2024-01-01 RX ADMIN — OXYCODONE HYDROCHLORIDE AND ACETAMINOPHEN 500 MG: 500 TABLET ORAL at 09:01

## 2024-01-01 RX ADMIN — Medication 6 MG: at 09:01

## 2024-01-01 RX ADMIN — VERAPAMIL HYDROCHLORIDE 240 MG: 120 TABLET, FILM COATED, EXTENDED RELEASE ORAL at 09:01

## 2024-01-01 RX ADMIN — MUPIROCIN: 20 OINTMENT TOPICAL at 09:01

## 2024-01-01 RX ADMIN — CEFTRIAXONE 1 G: 1 INJECTION, POWDER, FOR SOLUTION INTRAMUSCULAR; INTRAVENOUS at 10:01

## 2024-01-01 RX ADMIN — REMDESIVIR 100 MG: 100 INJECTION, POWDER, LYOPHILIZED, FOR SOLUTION INTRAVENOUS at 09:01

## 2024-01-01 RX ADMIN — ENOXAPARIN SODIUM 90 MG: 100 INJECTION SUBCUTANEOUS at 09:01

## 2024-01-01 RX ADMIN — DOXAZOSIN 1 MG: 1 TABLET ORAL at 10:01

## 2024-01-01 RX ADMIN — Medication 6 MG: at 12:01

## 2024-01-01 NOTE — NURSING
[facility], [facility]  Nurses Note -- 4 Eyes      1/1/2024       Skin assessed on: Q Shift      [x] No Pressure Injuries Present    [x]Prevention Measures Documented  Bruising L lower Abdomen, R anterior hand, Redness to upper back    [] Yes LDA  for Pressure Injury Previously documented     [] Yes New Pressure Injury Discovered   [] LDA for New Pressure Injury Added      Attending RN:  Nirmala Serna LPN     Second RN:  Nini GREGORY

## 2024-01-01 NOTE — PLAN OF CARE
Problem: Physical Therapy  Goal: Physical Therapy Goal  Description: Goals to be met by:  1/15/2024    Patient will increase functional independence with mobility by performin. Supine to sit independently  2. Sit to supine independently  3. Sit to stand transfer independently  4. Gait  x 200 feet Moderately independent with Rolling Walker    Recommend: Low Intensity Therapy at time of discharge to home with family.   Due to Hx of Myasthenia Gravis (Tx no more than 3 x's per week and only Functional Task & Gait Training with multiple Rest Breaks to address Covid+ without making M.Gravis worst)       Outcome: Ongoing, Progressing

## 2024-01-01 NOTE — PROGRESS NOTES
Pt placed on 2L O2 via NC as pt SATS was 88-89% on RA.    SATs 95% with 2 L O2.   Pt resting well, side rails up X 3, bed alarm set, Will continue to monitor.

## 2024-01-01 NOTE — PROGRESS NOTES
[facility], [facility] [place]  Nurses Note -- 4 Eyes      12/31/2023       Skin assessed on: Q Shift      [x] No Pressure Injuries Present    [x]Prevention Measures Documented    [] Yes LDA  for Pressure Injury Previously documented     [] Yes New Pressure Injury Discovered   [] LDA for New Pressure Injury Added      Attending RN:  Nini Haley RN     Second RN:  Govind Whitaker RN

## 2024-01-01 NOTE — PROGRESS NOTES
[facility] Medicine  Progress Note    Patient Name: Sharita Gonzalez  MRN: [id number]  Patient Class: IP- Inpatient   Admission Date: 12/30/2023  Length of Stay: 2 days  Attending Physician: Killian Gutierrez MD  Primary Care Provider: Dawood Leo MD        Subjective:     Principal Problem:COVID-19 virus infection        HPI:  This is an 78-year-old male with a past medical history of hypertension,,SAH, PMR, Myasthenia gravis, history of prostate cancer, rectal cancer, tobacco use who presents with fevers.     Patient presents with fevers that started a day prior to presentation. He reports nasal congestion, rhinorrhea, and productive cough with green sputum over the last 3 weeks. He denies sick contacts.     In the ED, the patient was febrile (Tmax:  38.7), tachypneic, and saturating 91% on room air.  Labs were remarkable for positive COVID-19, elevated BNP (888), elevated troponin (0.18), elevated lactic acid (2.27 > 1.88) elevated CRP (62.7).  Chest x-ray showed no acute process.  He was given LR 1.8 L, Decadron 8 mg IV, DuoNeb x3 and Tylenol 1 g.  Patient was admitted for further management.    Overview/Hospital Course:  Mr. Gonzalez is a pleasant 78-year-old gentleman who is admitted to the hospital for COVID-19 infection.  He presented with complaints of cough with green phlegm, fevers, congestion.  Patient's chest x-ray showed no acute infiltrates.  Patient's oxygen saturation was 89% on room air.  The patient was placed on droplet and contact precautions, remdesivir, Decadron, low-flow oxygen, and will add Rocephin 1 g q.day. patient was given hydralazine p.r.n. for hypertension, developed redness of the face, possible rash allergic reaction.  Hydralazine was discontinued and his allergy list.  Patient also had elevated D-dimer 1.70, will check CTA chest and bilateral venous Dopplers.    Interval History:  Patient is seen for follow-up care.  Patient being treated for COVID-19 infection  and URI/early pneumonia with cough and green phlegm.  Patient is on Rocephin, Decadron, remdesivir, and low-flow oxygen.  Patient had elevated D-dimer of 1.7, would check bilateral venous Dopplers and CTA chest.  Patient developed redness of the face, possibly reaction to the hydralazine.  We will discontinue hydralazine list as allergy.  Patient home blood pressure medications were resumed.  We will add Cardura 1 mg daily as needed for blood pressure management.  Patient says had Flomax in the past for BPH symptoms, but did not tolerate well, caused ED concerns.        Review of Systems   Constitutional: Negative.    HENT: Negative.     Eyes: Negative.    Respiratory:  Positive for cough and shortness of breath.    Cardiovascular: Negative.    Gastrointestinal: Negative.    Endocrine: Negative.    Genitourinary: Negative.    Musculoskeletal: Negative.    Skin: Negative.    Allergic/Immunologic: Negative.    Neurological: Negative.    Hematological: Negative.    Psychiatric/Behavioral: Negative.       Objective:     Vital Signs (Most Recent):  Temp: 97.9 °F (36.6 °C) (01/01/24 1157)  Pulse: 77 (01/01/24 1157)  Resp: 18 (01/01/24 1157)  BP: (!) 166/81 (01/01/24 1157)  SpO2: 96 % (01/01/24 1157) Vital Signs (24h Range):  Temp:  [97.3 °F (36.3 °C)-98.3 °F (36.8 °C)] 97.9 °F (36.6 °C)  Pulse:  [60-77] 77  Resp:  [18] 18  SpO2:  [94 %-99 %] 96 %  BP: (141-182)/(67-88) 166/81     Weight: 86.8 kg (191 lb 4.8 oz)  Body mass index is 31.83 kg/m².    Intake/Output Summary (Last 24 hours) at 1/1/2024 1215  Last data filed at 1/1/2024 1021  Gross per 24 hour   Intake 800 ml   Output --   Net 800 ml         Physical Exam  Vitals reviewed.   Constitutional:       Appearance: Normal appearance. He is normal weight.   HENT:      Head: Normocephalic and atraumatic.      Right Ear: External ear normal.      Left Ear: External ear normal.      Nose: Nose normal.      Mouth/Throat:      Mouth: Mucous membranes are moist.      Pharynx:  "Oropharynx is clear.   Eyes:      Extraocular Movements: Extraocular movements intact.      Conjunctiva/sclera: Conjunctivae normal.      Pupils: Pupils are equal, round, and reactive to light.   Cardiovascular:      Rate and Rhythm: Normal rate and regular rhythm.      Pulses: Normal pulses.      Heart sounds: Normal heart sounds.   Pulmonary:      Effort: Pulmonary effort is normal.      Breath sounds: Normal breath sounds.   Abdominal:      General: Abdomen is flat. Bowel sounds are normal.      Palpations: Abdomen is soft.   Musculoskeletal:         General: Normal range of motion.      Cervical back: Normal range of motion and neck supple.   Skin:     General: Skin is warm.      Capillary Refill: Capillary refill takes less than 2 seconds.   Neurological:      General: No focal deficit present.      Mental Status: He is alert and oriented to person, place, and time. Mental status is at baseline.      Cranial Nerves: No cranial nerve deficit.   Psychiatric:         Mood and Affect: Mood normal.         Behavior: Behavior normal.         Thought Content: Thought content normal.         Judgment: Judgment normal.             Significant Labs: All pertinent labs within the past 24 hours have been reviewed.  Blood Culture:   Recent Labs   Lab 12/30/23  1408 12/30/23  1438   LABBLOO No Growth to date  No Growth to date No Growth to date  No Growth to date     BMP:   Recent Labs   Lab 12/31/23  0005 01/01/24  0457   * 115*    143   K 4.2 3.9    106   CO2 25 27   BUN 19 20   CREATININE 1.1 0.8   CALCIUM 8.3* 8.5*   MG 2.1  --      CBC:   Recent Labs   Lab 12/31/23  0005 01/01/24  0457   WBC 7.87 8.26   HGB 11.8* 12.0*   HCT 36.9* 37.6*    231     Respiratory Culture: No results for input(s): "GSRESP", "RESPIRATORYC" in the last 48 hours.  Urine Culture: No results for input(s): "LABURIN" in the last 48 hours.    Significant Imaging: I have reviewed all pertinent imaging results/findings " within the past 24 hours.    Assessment/Plan:      * COVID-19 virus infection  Patient is identified as Severe COVID-19 based on hypoxemia with O2 saturations <94% on room air or on ambulation   Initiate standard COVID protocols; COVID-19 testing ,Infection Control notification  and isolation- respiratory, contact and droplet per protocol    Diagnostics: CBC, CMP, Ferritin, CRP, and Portable CXR    Management: Maintain oxygen saturations 92-96% via Nasal Cannula  LPM and monitor with continuous/intermittent pulse oximetry.  and Inhaled bronchodilators as needed for shortness of breath.      Acute respiratory failure with hypoxia due to COVID-19 infection    -admitted to the medical floor  -patient placed on COVID order set  -Decadron 6 mg IV daily  -Remdesivir, day # 3 of 5   -blood cultures no growth.  -elevated D-dimer 1.7, check CTA chest and bilateral venous Dopplers.    Myasthenia gravis  -Currently on decadron, continue Prednisone 15 mg on discharge   -follow-up with outpatient Neurology Clinic      Hypertension  Chronic, controlled. Latest blood pressure and vitals reviewed-     Temp:  [98.5 °F (36.9 °C)-101.6 °F (38.7 °C)]   Pulse:  [80-88]   Resp:  [20-26]   BP: ()/(60-72)   SpO2:  [91 %-98 %] .   Home meds for hypertension were reviewed and noted below.   Hypertension Medications               trandolapril (MAVIK) 4 MG Tab 4 mg every morning.     verapamil (CALAN-SR) 240 MG CR tablet Take 240 mg by mouth every morning.             While in the hospital, will manage blood pressure as follows; Continue home antihypertensive regimen losartan and verapamil  -discontinue hydralazine due to developing redness of the face, possible allergic reaction  -add Cardura 1 mg daily to assist with hypertensive management.  -use Flomax in the past for BPH symptoms, but did not like as caused ED concerns.    Will utilize p.r.n. blood pressure medication only if patient's blood pressure greater than 180/110 and he  develops symptoms such as worsening chest pain or shortness of breath.      VTE Risk Mitigation (From admission, onward)           Ordered     enoxaparin injection 90 mg  Every 12 hours         12/30/23 2213     IP VTE HIGH RISK PATIENT  Once         12/30/23 2031     Place sequential compression device  Until discontinued         12/30/23 2031                    Discharge Planning   AILYN:      Code Status: Full Code   Is the patient medically ready for discharge?:     Reason for patient still in hospital (select all that apply): Patient unstable, Patient trending condition, Treatment, and PT / OT recommendations  Discharge Plan A: Home with family (Follow-Up)                  Killian Gutierrez MD  Department of Hospital Medicine   [facility] - [place] - Critical access hospital

## 2024-01-01 NOTE — PT/OT/SLP EVAL
Physical Therapy Evaluation    Patient Name:  Sharita Gonzalez   MRN:  [id number]    Recommendations:     Discharge Recommendations: Low Intensity Therapy (- Recommending workinig on functional activities, ambulation, endurance, but NO Resistance, Stretching/ROM, and Deep Breaths (1 x's per week over 12 weeks) to address Covid+ issues without adversely effecting Hx of Myasthenia Gravis.)   Discharge Equipment Recommendations: bath bench   Barriers to discharge: None    Assessment:     Sharita Gonzalez is a 78 y.o. male admitted with a medical diagnosis of COVID-19 virus infection.  He presents with the following impairments/functional limitations: impaired endurance, impaired functional mobility, gait instability, decreased lower extremity function, decreased safety awareness.    Rehab Prognosis: Good; patient would benefit from acute skilled PT services to address these deficits and reach maximum level of function.    Recent Surgery: * No surgery found *      Plan:     During this hospitalization, patient to be seen 3 x/week ((MAX) working on functional Tasks:  Gait & ADLs.  No Resistive/Strength Training with [employer] at this time.) to address the identified rehab impairments via gait training, therapeutic activities and progress toward the following goals:    Plan of Care Expires:  01/15/24    Subjective     Chief Complaint: problems breathing  Patient/Family Comments/goals: to return to PLOF  Pain/Comfort:  Pain Rating 1: 0/10    Patients cultural, spiritual, Zoroastrian conflicts given the current situation: no    Living Environment:  Pt lives with spouse in a [facility] with 13 DANIEL with Gt Hand Rails.   Prior to admission, patients level of function was Mod I.  Equipment used at home: cane, quad, walker, rolling.  DME owned (not currently used): none.  Upon discharge, patient will have assistance from Spouse and Self.    Objective:     Communicated with Nurse Benson prior to session.  Patient found sitting edge of bed  with peripheral IV, telemetry, oxygen  upon PT entry to room.    General Precautions: Standard, fall  Orthopedic Precautions:Full weight bearing   Braces: N/A  Respiratory Status: Nasal cannula, flow 2.0  L/min    Exams:  Cognitive Exam:  Patient is oriented to Person, Place, and Situation  Gross Motor Coordination:  WFL  Postural Exam:  Patient presented with the following abnormalities:    -       Rounded shoulders  -       Forward head  -       Abnormal trunk flexion  Skin Integrity/Edema:      -       Skin integrity: Visible skin intact  -       Edema: None noted Gt Lower Extremity  RLE ROM: WFL  RLE Strength: WFL  LLE ROM: WFL  LLE Strength: WFL    Functional Mobility:  Bed Mobility:     Supine to Sit: minimum assistance  Sit to Supine: minimum assistance  Transfers:     Sit to Stand:  minimum assistance with rolling walker  Gait: 25' from EOB to Toilet with RW and Min A and Rest break between trials    AM-PAC 6 CLICK MOBILITY  Total Score:17       Treatment & Education:  Patient educated on POC and Agrees to Therapy upon discharge (1 x a week over 12 weeks) vs (3 x's a week for 4 weeks) without any resistance training mainly: Endurance, Breathing, ROM, and Balance Activities to address Covid Issues with the less effect on Hx of Myasthenia Gravis.      Patient left sitting edge of bed with all lines intact, call button in reach, and Spouse present.    GOALS:   Multidisciplinary Problems       Physical Therapy Goals          Problem: Physical Therapy    Goal Priority Disciplines Outcome Goal Variances Interventions   Physical Therapy Goal     PT, PT/OT Ongoing, Progressing     Description: Goals to be met by:  1/15/2024    Patient will increase functional independence with mobility by performin. Supine to sit independently  2. Sit to supine independently  3. Sit to stand transfer independently  4. Gait  x 200 feet Moderately independent with Rolling Walker    Recommend: Low Intensity Therapy at time of  discharge to home with family.   Due to Hx of Myasthenia Gravis (Tx no more than 3 x's per week and only Functional Task = TA)                            History:     Past Medical History:   Diagnosis Date    Carpal tunnel syndrome     Hypertension     Polymyalgia     Prostate cancer        Past Surgical History:   Procedure Laterality Date    BRAIN SURGERY  age 27    subarrachnoid brain hemorhage     CARPAL TUNNEL RELEASE Right 7/9/2018    Procedure: RELEASE, CARPAL TUNNEL - RIGHT;  Surgeon: Christine Simpson MD;  Location: [facility];  Service: Orthopedics;  Laterality: Right;  Stretcher; Supine; Hand pan 1 & Pan 2    CATARACT EXTRACTION      EYE SURGERY      FINGER SURGERY  9-17-13    left TFR    HEMORRHOID SURGERY      radiation seeds   2008    prostate cancer    SHOULDER SURGERY Right     rotator       Time Tracking:     PT Received On: 01/01/24  PT Start Time: 1100     PT Stop Time: 1130  PT Total Time (min): 30 min     Billable Minutes: Evaluation 20 min    Viral Bashir, PT, DPT, CDN, CMSK  01/01/2024

## 2024-01-01 NOTE — PLAN OF CARE
Problem: Infection  Goal: Absence of Infection Signs and Symptoms  1/1/2024 0418 by Nini Marlow RN  Outcome: Ongoing, Progressing  1/1/2024 0418 by Nini Marlow RN  Outcome: Ongoing, Progressing  Intervention: Prevent or Manage Infection  Flowsheets (Taken 1/1/2024 0418)  Fever Reduction/Comfort Measures:   lightweight bedding   lightweight clothing  Infection Management: aseptic technique maintained  Isolation Precautions:   precautions maintained   airborne   contact   droplet     Problem: Fall Injury Risk  Goal: Absence of Fall and Fall-Related Injury  Outcome: Ongoing, Progressing  Intervention: Identify and Manage Contributors  Flowsheets (Taken 1/1/2024 0418)  Self-Care Promotion:   independence encouraged   BADL personal objects within reach   meal set-up provided  Intervention: Promote Injury-Free Environment  Flowsheets (Taken 1/1/2024 0418)  Safety Promotion/Fall Prevention:   side rails raised x 3   medications reviewed   nonskid shoes/socks when out of bed   instructed to call staff for mobility   toileting scheduled

## 2024-01-01 NOTE — SUBJECTIVE & OBJECTIVE
Interval History:  Patient is seen for follow-up care.  Patient being treated for COVID-19 infection and URI/early pneumonia with cough and green phlegm.  Patient is on Rocephin, Decadron, remdesivir, and low-flow oxygen.  Patient had elevated D-dimer of 1.7, would check bilateral venous Dopplers and CTA chest.  Patient developed redness of the face, possibly reaction to the hydralazine.  We will discontinue hydralazine list as allergy.  Patient home blood pressure medications were resumed.  We will add Cardura 1 mg daily as needed for blood pressure management.  Patient says had Flomax in the past for BPH symptoms, but did not tolerate well, caused ED concerns.        Review of Systems   Constitutional: Negative.    HENT: Negative.     Eyes: Negative.    Respiratory:  Positive for cough and shortness of breath.    Cardiovascular: Negative.    Gastrointestinal: Negative.    Endocrine: Negative.    Genitourinary: Negative.    Musculoskeletal: Negative.    Skin: Negative.    Allergic/Immunologic: Negative.    Neurological: Negative.    Hematological: Negative.    Psychiatric/Behavioral: Negative.       Objective:     Vital Signs (Most Recent):  Temp: 97.9 °F (36.6 °C) (01/01/24 1157)  Pulse: 77 (01/01/24 1157)  Resp: 18 (01/01/24 1157)  BP: (!) 166/81 (01/01/24 1157)  SpO2: 96 % (01/01/24 1157) Vital Signs (24h Range):  Temp:  [97.3 °F (36.3 °C)-98.3 °F (36.8 °C)] 97.9 °F (36.6 °C)  Pulse:  [60-77] 77  Resp:  [18] 18  SpO2:  [94 %-99 %] 96 %  BP: (141-182)/(67-88) 166/81     Weight: 86.8 kg (191 lb 4.8 oz)  Body mass index is 31.83 kg/m².    Intake/Output Summary (Last 24 hours) at 1/1/2024 1215  Last data filed at 1/1/2024 1021  Gross per 24 hour   Intake 800 ml   Output --   Net 800 ml         Physical Exam  Vitals reviewed.   Constitutional:       Appearance: Normal appearance. He is normal weight.   HENT:      Head: Normocephalic and atraumatic.      Right Ear: External ear normal.      Left Ear: External ear  "normal.      Nose: Nose normal.      Mouth/Throat:      Mouth: Mucous membranes are moist.      Pharynx: Oropharynx is clear.   Eyes:      Extraocular Movements: Extraocular movements intact.      Conjunctiva/sclera: Conjunctivae normal.      Pupils: Pupils are equal, round, and reactive to light.   Cardiovascular:      Rate and Rhythm: Normal rate and regular rhythm.      Pulses: Normal pulses.      Heart sounds: Normal heart sounds.   Pulmonary:      Effort: Pulmonary effort is normal.      Breath sounds: Normal breath sounds.   Abdominal:      General: Abdomen is flat. Bowel sounds are normal.      Palpations: Abdomen is soft.   Musculoskeletal:         General: Normal range of motion.      Cervical back: Normal range of motion and neck supple.   Skin:     General: Skin is warm.      Capillary Refill: Capillary refill takes less than 2 seconds.   Neurological:      General: No focal deficit present.      Mental Status: He is alert and oriented to person, place, and time. Mental status is at baseline.      Cranial Nerves: No cranial nerve deficit.   Psychiatric:         Mood and Affect: Mood normal.         Behavior: Behavior normal.         Thought Content: Thought content normal.         Judgment: Judgment normal.             Significant Labs: All pertinent labs within the past 24 hours have been reviewed.  Blood Culture:   Recent Labs   Lab 12/30/23  1408 12/30/23  1438   LABBLOO No Growth to date  No Growth to date No Growth to date  No Growth to date     BMP:   Recent Labs   Lab 12/31/23  0005 01/01/24  0457   * 115*    143   K 4.2 3.9    106   CO2 25 27   BUN 19 20   CREATININE 1.1 0.8   CALCIUM 8.3* 8.5*   MG 2.1  --      CBC:   Recent Labs   Lab 12/31/23  0005 01/01/24  0457   WBC 7.87 8.26   HGB 11.8* 12.0*   HCT 36.9* 37.6*    231     Respiratory Culture: No results for input(s): "GSRESP", "RESPIRATORYC" in the last 48 hours.  Urine Culture: No results for input(s): "LABURIN" " in the last 48 hours.    Significant Imaging: I have reviewed all pertinent imaging results/findings within the past 24 hours.

## 2024-01-02 LAB
POCT GLUCOSE: 100 MG/DL (ref 70–110)
POCT GLUCOSE: 124 MG/DL (ref 70–110)
POCT GLUCOSE: 147 MG/DL (ref 70–110)
POCT GLUCOSE: 148 MG/DL (ref 70–110)

## 2024-01-02 PROCEDURE — 63600175 PHARM REV CODE 636 W HCPCS

## 2024-01-02 PROCEDURE — 25000003 PHARM REV CODE 250: Performed by: STUDENT IN AN ORGANIZED HEALTH CARE EDUCATION/TRAINING PROGRAM

## 2024-01-02 PROCEDURE — 63600175 PHARM REV CODE 636 W HCPCS: Performed by: INTERNAL MEDICINE

## 2024-01-02 PROCEDURE — 25000003 PHARM REV CODE 250: Performed by: HOSPITALIST

## 2024-01-02 PROCEDURE — 97530 THERAPEUTIC ACTIVITIES: CPT | Mod: CQ

## 2024-01-02 PROCEDURE — 27000207 HC ISOLATION

## 2024-01-02 PROCEDURE — 25500020 PHARM REV CODE 255: Performed by: HOSPITALIST

## 2024-01-02 PROCEDURE — 21400001 HC TELEMETRY ROOM

## 2024-01-02 PROCEDURE — 63600175 PHARM REV CODE 636 W HCPCS: Mod: JZ,TB | Performed by: STUDENT IN AN ORGANIZED HEALTH CARE EDUCATION/TRAINING PROGRAM

## 2024-01-02 PROCEDURE — 97110 THERAPEUTIC EXERCISES: CPT | Mod: CQ

## 2024-01-02 PROCEDURE — 25000003 PHARM REV CODE 250: Performed by: INTERNAL MEDICINE

## 2024-01-02 RX ORDER — LABETALOL HYDROCHLORIDE 5 MG/ML
10 INJECTION, SOLUTION INTRAVENOUS ONCE
Status: COMPLETED | OUTPATIENT
Start: 2024-01-02 | End: 2024-01-02

## 2024-01-02 RX ORDER — GUAIFENESIN/DEXTROMETHORPHAN 100-10MG/5
10 SYRUP ORAL EVERY 6 HOURS
Status: DISCONTINUED | OUTPATIENT
Start: 2024-01-02 | End: 2024-01-03 | Stop reason: HOSPADM

## 2024-01-02 RX ORDER — BENZONATATE 100 MG/1
100 CAPSULE ORAL 3 TIMES DAILY PRN
Status: DISCONTINUED | OUTPATIENT
Start: 2024-01-02 | End: 2024-01-03 | Stop reason: HOSPADM

## 2024-01-02 RX ORDER — HYDROCHLOROTHIAZIDE 12.5 MG/1
12.5 TABLET ORAL DAILY
Status: DISCONTINUED | OUTPATIENT
Start: 2024-01-02 | End: 2024-01-03 | Stop reason: HOSPADM

## 2024-01-02 RX ADMIN — CEFTRIAXONE 1 G: 1 INJECTION, POWDER, FOR SOLUTION INTRAMUSCULAR; INTRAVENOUS at 10:01

## 2024-01-02 RX ADMIN — LOPERAMIDE HYDROCHLORIDE 2 MG: 2 CAPSULE ORAL at 09:01

## 2024-01-02 RX ADMIN — OXYCODONE HYDROCHLORIDE AND ACETAMINOPHEN 500 MG: 500 TABLET ORAL at 08:01

## 2024-01-02 RX ADMIN — HYDROCHLOROTHIAZIDE 12.5 MG: 12.5 TABLET ORAL at 01:01

## 2024-01-02 RX ADMIN — BENZONATATE 100 MG: 100 CAPSULE ORAL at 08:01

## 2024-01-02 RX ADMIN — LABETALOL HYDROCHLORIDE 10 MG: 5 INJECTION INTRAVENOUS at 05:01

## 2024-01-02 RX ADMIN — VERAPAMIL HYDROCHLORIDE 240 MG: 120 TABLET, FILM COATED, EXTENDED RELEASE ORAL at 09:01

## 2024-01-02 RX ADMIN — ENOXAPARIN SODIUM 90 MG: 100 INJECTION SUBCUTANEOUS at 09:01

## 2024-01-02 RX ADMIN — IOHEXOL 75 ML: 350 INJECTION, SOLUTION INTRAVENOUS at 08:01

## 2024-01-02 RX ADMIN — DEXAMETHASONE 6 MG: 2 TABLET ORAL at 09:01

## 2024-01-02 RX ADMIN — GUAIFENESIN AND DEXTROMETHORPHAN 10 ML: 100; 10 SYRUP ORAL at 05:01

## 2024-01-02 RX ADMIN — LOSARTAN POTASSIUM 100 MG: 25 TABLET, FILM COATED ORAL at 09:01

## 2024-01-02 RX ADMIN — LOPERAMIDE HYDROCHLORIDE 2 MG: 2 CAPSULE ORAL at 08:01

## 2024-01-02 RX ADMIN — REMDESIVIR 100 MG: 100 INJECTION, POWDER, LYOPHILIZED, FOR SOLUTION INTRAVENOUS at 09:01

## 2024-01-02 RX ADMIN — MUPIROCIN: 20 OINTMENT TOPICAL at 08:01

## 2024-01-02 RX ADMIN — MUPIROCIN: 20 OINTMENT TOPICAL at 09:01

## 2024-01-02 RX ADMIN — ENOXAPARIN SODIUM 90 MG: 100 INJECTION SUBCUTANEOUS at 08:01

## 2024-01-02 RX ADMIN — DOXAZOSIN 1 MG: 1 TABLET ORAL at 09:01

## 2024-01-02 RX ADMIN — OXYCODONE HYDROCHLORIDE AND ACETAMINOPHEN 500 MG: 500 TABLET ORAL at 09:01

## 2024-01-02 RX ADMIN — THERA TABS 1 TABLET: TAB at 09:01

## 2024-01-02 RX ADMIN — Medication 6 MG: at 08:01

## 2024-01-02 NOTE — PROGRESS NOTES
Pt's /90 (manual) HR 74 RR 19 SPO2 99% with 2 L O2. Pt not in pain, does not complain of any kind of discomfort.     POLLO Corbin notified, read back order received to recheck BP again in  30 mins received, No pharmacological intervention at this time, as per POLLO Corbin, because pt's BP been high throughout his stay in the hospital    Post 30 mins, BP read 190/100 (manual), Inj labetolol 10 mg given as per order.     Monitor tech notified, Pt resting well on bed, wheels locked, low in ht, side rails up X 3, call bell with in reach.  Will recheck BP in  30 mins again.

## 2024-01-02 NOTE — PLAN OF CARE
Problem: Adult Inpatient Plan of Care  Goal: Optimal Comfort and Wellbeing  Outcome: Ongoing, Progressing  Goal: Readiness for Transition of Care  Outcome: Ongoing, Progressing     Problem: Infection  Goal: Absence of Infection Signs and Symptoms  Outcome: Ongoing, Progressing     Problem: Fall Injury Risk  Goal: Absence of Fall and Fall-Related Injury  Outcome: Ongoing, Progressing

## 2024-01-02 NOTE — CONSULTS
[facility]  Telemedicine Consult Note       Sharita Gonzalez has been accepted for transfer to [facility] and will be followed through telemedicine services beginning at 7 AM.      Mallory Awan MD  [facility] Medicine Staff

## 2024-01-02 NOTE — PROGRESS NOTES
[facility] [place]  Nurses Note -- 4 Eyes      1/1/2024       Skin assessed on: Q Shift      [x] No Pressure Injuries Present    [x]Prevention Measures Documented  Bruising L lower Abdomen, R anterior hand, Redness to upper back        [] Yes LDA  for Pressure Injury Previously documented     [] Yes New Pressure Injury Discovered   [] LDA for New Pressure Injury Added      Attending RN:  Nini Haley RN     Second RN:  Nirmala Serna LPN

## 2024-01-02 NOTE — PLAN OF CARE
01/02/24 1442   Medicare Message   Important Message from Medicare regarding Discharge Appeal Rights Given to patient/caregiver;Explained to patient/caregiver;Other (comments)  (CM spoke with patient via hospital room phone [phone], verbalized understanding. Copy mailed certified to address in chart. [phone])   Date IMM was signed 01/02/24   Time IMM was signed [id number]

## 2024-01-02 NOTE — PLAN OF CARE
Problem: Physical Therapy  Goal: Physical Therapy Goal  Description: Goals to be met by:  1/15/2024    Patient will increase functional independence with mobility by performin. Supine to sit independently  2. Sit to supine independently  3. Sit to stand transfer independently  4. Gait  x 200 feet Moderately independent with Rolling Walker    Recommend: Low Intensity Therapy at time of discharge to home with family.   Due to Hx of Myasthenia Gravis (Tx no more than 3 x's per week and only Functional Task = TA)       Outcome: Ongoing, Progressing       Pt ambulated ~60ft with no AD on RA, spO2 94-97% during ambulation. Pt with no LOB, dizziness or SOB during gait training.

## 2024-01-02 NOTE — PT/OT/SLP PROGRESS
"Physical Therapy Treatment    Patient Name:  Sharita Gonzalez   MRN:  [id number]    Recommendations:     Discharge Recommendations: Low Intensity Therapy (- Recommending workinig on Endurance, AROM, and Deep Breaths (1 x's per week for 12 weeks) to address Covid+ issues with effecting Hx of Myasthenia Gravis.)  Discharge Equipment Recommendations: bath bench  Barriers to discharge: None    Assessment:     Sharita Gonzalez is a 78 y.o. male admitted with a medical diagnosis of COVID-19 virus infection.  He presents with the following impairments/functional limitations: impaired endurance, impaired functional mobility, gait instability, decreased lower extremity function, decreased safety awareness.    Pt ambulated ~60ft with no AD on RA, spO2 94-97% during ambulation. Pt with no LOB, dizziness or SOB during gait training.    Rehab Prognosis: Good; patient would benefit from acute skilled PT services to address these deficits and reach maximum level of function.    Recent Surgery: * No surgery found *      Plan:     During this hospitalization, patient to be seen 3 x/week ((MAX) working on functional Task:  gait & ADLs.  No Resistive/Strength Training at this time.) to address the identified rehab impairments via gait training, therapeutic activities and progress toward the following goals:    Plan of Care Expires:  01/15/24    Subjective     Chief Complaint: "Can I disconnect my oxygen?"  Patient/Family Comments/goals: Pt agreed to therapy  Pain/Comfort:  Pain Rating 1: 0/10      Objective:     Communicated with Nirmala prior to session.  Patient found HOB elevated with oxygen, peripheral IV, pulse ox (continuous), telemetry upon PT entry to room.     General Precautions: Standard, fall  Orthopedic Precautions: Full weight bearing  Braces: N/A  Respiratory Status: Nasal cannula, flow 2 L/min     Functional Mobility: Pt performed therex and gait training on RA this date, per nursing. Pt 94-97% during therex and gait training. " Pt had no c/o dizziness or SOB, nursing aware.  Bed Mobility:     Rolling Left:  supervision  Scooting: supervision to scoot anteriorly towards EOB, pt required verbal cueing for foot placement on floor  Supine to Sit: supervision  Sit to Supine: supervision  Transfers: Gait belt donned for safety    Sit to Stand:  stand by assistance with no AD  Gait: Pt ambulated ~60ft using no AD on RA and with supervision. Pt demo decreased barbara and step length. Pt spO2 at 94-97% during ambulation  Balance: Pt with Fair dynamic standing balance      AM-PAC 6 CLICK MOBILITY  Turning over in bed (including adjusting bedclothes, sheets and blankets)?: 4  Sitting down on and standing up from a chair with arms (e.g., wheelchair, bedside commode, etc.): 4  Moving from lying on back to sitting on the side of the bed?: 4  Moving to and from a bed to a chair (including a wheelchair)?: 4  Need to walk in hospital room?: 4  Climbing 3-5 steps with a railing?: 3  Basic Mobility Total Score: 23       Treatment & Education:  Pt instructed to perform seated LAQ, HR, hip flexion and hip abd/add 2x10    Pt performed x10 STS from EOB using no AD and SBA. Pt required verbal cueing for safety technique and hand placement.    Patient left sitting edge of bed with all lines intact, call button in reach, Nirmala notified, and spouse present.    GOALS:   Multidisciplinary Problems       Physical Therapy Goals          Problem: Physical Therapy    Goal Priority Disciplines Outcome Goal Variances Interventions   Physical Therapy Goal     PT, PT/OT Ongoing, Progressing     Description: Goals to be met by:  1/15/2024    Patient will increase functional independence with mobility by performin. Supine to sit independently  2. Sit to supine independently  3. Sit to stand transfer independently  4. Gait  x 200 feet Moderately independent with Rolling Walker    Recommend: Low Intensity Therapy at time of discharge to home with family.   Due to Hx of  Myasthenia Gravis (Tx no more than 3 x's per week and only Functional Task = TA)                            Time Tracking:     PT Received On: 01/02/24  PT Start Time: 1344     PT Stop Time: 1408  PT Total Time (min): 24 min     Billable Minutes: Therapeutic Activity 15 and Therapeutic Exercise 9    Treatment Type: Treatment  PT/PTA: PTA     Number of PTA visits since last PT visit: 1 01/02/2024

## 2024-01-02 NOTE — NURSING
Patient off the unit via wheelchair, on 2L nc, no distress noted. Going to CT scan. Wife at bedside

## 2024-01-03 VITALS
HEIGHT: 65 IN | TEMPERATURE: 98 F | BODY MASS INDEX: 31.65 KG/M2 | WEIGHT: 190 LBS | SYSTOLIC BLOOD PRESSURE: 155 MMHG | HEART RATE: 74 BPM | DIASTOLIC BLOOD PRESSURE: 82 MMHG | RESPIRATION RATE: 18 BRPM | OXYGEN SATURATION: 93 %

## 2024-01-03 DIAGNOSIS — U07.1 COVID-19 VIRUS DETECTED: ICD-10-CM

## 2024-01-03 LAB
ANION GAP SERPL CALC-SCNC: 10 MMOL/L (ref 8–16)
BASOPHILS # BLD AUTO: 0.02 K/UL (ref 0–0.2)
BASOPHILS NFR BLD: 0.3 % (ref 0–1.9)
BUN SERPL-MCNC: 25 MG/DL (ref 8–23)
CALCIUM SERPL-MCNC: 8.8 MG/DL (ref 8.7–10.5)
CHLORIDE SERPL-SCNC: 104 MMOL/L (ref 95–110)
CO2 SERPL-SCNC: 26 MMOL/L (ref 23–29)
CREAT SERPL-MCNC: 1 MG/DL (ref 0.5–1.4)
DIFFERENTIAL METHOD BLD: ABNORMAL
EOSINOPHIL # BLD AUTO: 0 K/UL (ref 0–0.5)
EOSINOPHIL NFR BLD: 0 % (ref 0–8)
ERYTHROCYTE [DISTWIDTH] IN BLOOD BY AUTOMATED COUNT: 13.8 % (ref 11.5–14.5)
EST. GFR  (NO RACE VARIABLE): >60 ML/MIN/1.73 M^2
GLUCOSE SERPL-MCNC: 114 MG/DL (ref 70–110)
HCT VFR BLD AUTO: 35.4 % (ref 40–54)
HGB BLD-MCNC: 11.5 G/DL (ref 14–18)
IMM GRANULOCYTES # BLD AUTO: 0.11 K/UL (ref 0–0.04)
IMM GRANULOCYTES NFR BLD AUTO: 1.7 % (ref 0–0.5)
LYMPHOCYTES # BLD AUTO: 0.7 K/UL (ref 1–4.8)
LYMPHOCYTES NFR BLD: 10.3 % (ref 18–48)
MAGNESIUM SERPL-MCNC: 2.2 MG/DL (ref 1.6–2.6)
MCH RBC QN AUTO: 31.3 PG (ref 27–31)
MCHC RBC AUTO-ENTMCNC: 32.5 G/DL (ref 32–36)
MCV RBC AUTO: 96 FL (ref 82–98)
MONOCYTES # BLD AUTO: 0.5 K/UL (ref 0.3–1)
MONOCYTES NFR BLD: 7.9 % (ref 4–15)
NEUTROPHILS # BLD AUTO: 5.1 K/UL (ref 1.8–7.7)
NEUTROPHILS NFR BLD: 79.8 % (ref 38–73)
NRBC BLD-RTO: 0 /100 WBC
PHOSPHATE SERPL-MCNC: 3.4 MG/DL (ref 2.7–4.5)
PLATELET # BLD AUTO: 219 K/UL (ref 150–450)
PMV BLD AUTO: 11.2 FL (ref 9.2–12.9)
POCT GLUCOSE: 101 MG/DL (ref 70–110)
POCT GLUCOSE: 98 MG/DL (ref 70–110)
POTASSIUM SERPL-SCNC: 3.8 MMOL/L (ref 3.5–5.1)
RBC # BLD AUTO: 3.68 M/UL (ref 4.6–6.2)
SODIUM SERPL-SCNC: 140 MMOL/L (ref 136–145)
WBC # BLD AUTO: 6.33 K/UL (ref 3.9–12.7)

## 2024-01-03 PROCEDURE — 25000003 PHARM REV CODE 250: Performed by: STUDENT IN AN ORGANIZED HEALTH CARE EDUCATION/TRAINING PROGRAM

## 2024-01-03 PROCEDURE — 63600175 PHARM REV CODE 636 W HCPCS: Performed by: INTERNAL MEDICINE

## 2024-01-03 PROCEDURE — 25000003 PHARM REV CODE 250: Performed by: INTERNAL MEDICINE

## 2024-01-03 PROCEDURE — 85025 COMPLETE CBC W/AUTO DIFF WBC: CPT | Performed by: HOSPITALIST

## 2024-01-03 PROCEDURE — 36415 COLL VENOUS BLD VENIPUNCTURE: CPT | Performed by: HOSPITALIST

## 2024-01-03 PROCEDURE — 83735 ASSAY OF MAGNESIUM: CPT | Performed by: HOSPITALIST

## 2024-01-03 PROCEDURE — 25000003 PHARM REV CODE 250: Performed by: HOSPITALIST

## 2024-01-03 PROCEDURE — 63600175 PHARM REV CODE 636 W HCPCS: Performed by: STUDENT IN AN ORGANIZED HEALTH CARE EDUCATION/TRAINING PROGRAM

## 2024-01-03 PROCEDURE — 84100 ASSAY OF PHOSPHORUS: CPT | Performed by: HOSPITALIST

## 2024-01-03 PROCEDURE — 80048 BASIC METABOLIC PNL TOTAL CA: CPT | Performed by: HOSPITALIST

## 2024-01-03 RX ORDER — GUAIFENESIN/DEXTROMETHORPHAN 100-10MG/5
5 SYRUP ORAL EVERY 6 HOURS PRN
Qty: 118 ML | Refills: 0 | Status: SHIPPED | OUTPATIENT
Start: 2024-01-03 | End: 2024-01-13

## 2024-01-03 RX ORDER — HYDROCHLOROTHIAZIDE 12.5 MG/1
12.5 TABLET ORAL DAILY
Qty: 30 TABLET | Refills: 11 | Status: SHIPPED | OUTPATIENT
Start: 2024-01-04 | End: 2025-01-03

## 2024-01-03 RX ORDER — BENZONATATE 100 MG/1
100 CAPSULE ORAL 3 TIMES DAILY PRN
Qty: 30 CAPSULE | Refills: 0 | Status: SHIPPED | OUTPATIENT
Start: 2024-01-03 | End: 2024-01-13

## 2024-01-03 RX ORDER — DOXAZOSIN 1 MG/1
1 TABLET ORAL DAILY
Qty: 30 TABLET | Refills: 11 | Status: SHIPPED | OUTPATIENT
Start: 2024-01-04 | End: 2025-01-03

## 2024-01-03 RX ADMIN — MUPIROCIN: 20 OINTMENT TOPICAL at 09:01

## 2024-01-03 RX ADMIN — DEXAMETHASONE 6 MG: 2 TABLET ORAL at 09:01

## 2024-01-03 RX ADMIN — GUAIFENESIN AND DEXTROMETHORPHAN 10 ML: 100; 10 SYRUP ORAL at 11:01

## 2024-01-03 RX ADMIN — POLYETHYLENE GLYCOL 3350 17 G: 17 POWDER, FOR SOLUTION ORAL at 09:01

## 2024-01-03 RX ADMIN — CEFTRIAXONE 1 G: 1 INJECTION, POWDER, FOR SOLUTION INTRAMUSCULAR; INTRAVENOUS at 10:01

## 2024-01-03 RX ADMIN — HYDROCHLOROTHIAZIDE 12.5 MG: 12.5 TABLET ORAL at 09:01

## 2024-01-03 RX ADMIN — LOSARTAN POTASSIUM 100 MG: 25 TABLET, FILM COATED ORAL at 09:01

## 2024-01-03 RX ADMIN — ENOXAPARIN SODIUM 90 MG: 100 INJECTION SUBCUTANEOUS at 09:01

## 2024-01-03 RX ADMIN — THERA TABS 1 TABLET: TAB at 09:01

## 2024-01-03 RX ADMIN — LOPERAMIDE HYDROCHLORIDE 2 MG: 2 CAPSULE ORAL at 09:01

## 2024-01-03 RX ADMIN — REMDESIVIR 100 MG: 100 INJECTION, POWDER, LYOPHILIZED, FOR SOLUTION INTRAVENOUS at 09:01

## 2024-01-03 RX ADMIN — OXYCODONE HYDROCHLORIDE AND ACETAMINOPHEN 500 MG: 500 TABLET ORAL at 09:01

## 2024-01-03 RX ADMIN — DOXAZOSIN 1 MG: 1 TABLET ORAL at 09:01

## 2024-01-03 RX ADMIN — GUAIFENESIN AND DEXTROMETHORPHAN 10 ML: 100; 10 SYRUP ORAL at 05:01

## 2024-01-03 RX ADMIN — VERAPAMIL HYDROCHLORIDE 240 MG: 120 TABLET, FILM COATED, EXTENDED RELEASE ORAL at 09:01

## 2024-01-03 NOTE — PLAN OF CARE
Problem: Adult Inpatient Plan of Care  Goal: Plan of Care Review  Outcome: Ongoing, Progressing  Flowsheets (Taken 1/2/2024 1946)  Plan of Care Reviewed With:   patient   spouse     Problem: Fall Injury Risk  Goal: Absence of Fall and Fall-Related Injury  Outcome: Ongoing, Progressing

## 2024-01-03 NOTE — NURSING
[facility]  Nurses Note -- 4 Eyes      1/2/2024       Skin assessed on: Q Shift      [x] No Pressure Injuries Present    [x]Prevention Measures Documented    [] Yes LDA  for Pressure Injury Previously documented     [] Yes New Pressure Injury Discovered   [] LDA for New Pressure Injury Added      Attending RN:  Nirmala Serna LPN     Second RN:  Nini GREGORY

## 2024-01-03 NOTE — ASSESSMENT & PLAN NOTE
Patient is identified as Severe COVID-19 based on hypoxemia with O2 saturations <94% on room air or on ambulation   Initiate standard COVID protocols; COVID-19 testing ,Infection Control notification  and isolation- respiratory, contact and droplet per protocol    Diagnostics: CBC, CMP, Ferritin, CRP, and Portable CXR    Management: Maintain oxygen saturations 92-96% via Nasal Cannula  LPM and monitor with continuous/intermittent pulse oximetry.  and Inhaled bronchodilators as needed for shortness of breath.      Acute respiratory failure with hypoxia due to COVID-19 infection    -admitted to the medical floor  -patient placed on COVID order set  -Decadron 6 mg IV daily  -Remdesivir  -6 min walk test

## 2024-01-03 NOTE — PLAN OF CARE
Pt is AAOx4. Room air. Tele maintained.  No falls or injuries reported during shift, safety precautions maintained.    Problem: Adult Inpatient Plan of Care  Goal: Plan of Care Review  Outcome: Ongoing, Progressing     Problem: Adult Inpatient Plan of Care  Goal: Optimal Comfort and Wellbeing  Outcome: Ongoing, Progressing

## 2024-01-03 NOTE — NURSING
Patient is discharged, on room air, no distress noted. Tele and IV removed with tip intact. Instructions printed. To be reviewed by Virtual Nurse. Bed in lowest position, wheels locked, call light in reach. Wife at bedside.

## 2024-01-03 NOTE — SUBJECTIVE & OBJECTIVE
Interval History: Pt noted to be awake, alert, conversant and calm. HDS and afebrile. No acute events overnight. No new complaints this morning. Feels close to baseline. Wean off oxygen today. 6 min walk test today. Antitussives ordered.      Review of Systems   Constitutional:  Positive for activity change and fatigue. Negative for fever.   Respiratory:  Positive for cough. Negative for shortness of breath.    Cardiovascular:  Negative for chest pain.   Gastrointestinal:  Negative for abdominal pain.   Neurological:  Negative for dizziness and headaches.   Psychiatric/Behavioral:  Negative for confusion.    All other systems reviewed and are negative.    Objective:     Vital Signs (Most Recent):  Temp: 98.5 °F (36.9 °C) (01/02/24 2029)  Pulse: 72 (01/02/24 2029)  Resp: 18 (01/02/24 2029)  BP: 135/76 (01/02/24 2029)  SpO2: 97 % (01/02/24 2029) Vital Signs (24h Range):  Temp:  [97.4 °F (36.3 °C)-98.5 °F (36.9 °C)] 98.5 °F (36.9 °C)  Pulse:  [66-74] 72  Resp:  [18-19] 18  SpO2:  [97 %-99 %] 97 %  BP: (130-190)/() 135/76     Weight: 86.7 kg (191 lb 2.2 oz)  Body mass index is 31.81 kg/m².    Intake/Output Summary (Last 24 hours) at 1/2/2024 2325  Last data filed at 1/2/2024 1850  Gross per 24 hour   Intake 940 ml   Output --   Net 940 ml         Physical Exam  Vitals and nursing note reviewed.   Constitutional:       Appearance: Normal appearance.   HENT:      Head: Normocephalic and atraumatic.   Eyes:      Extraocular Movements: Extraocular movements intact.      Pupils: Pupils are equal, round, and reactive to light.   Cardiovascular:      Rate and Rhythm: Normal rate and regular rhythm.   Pulmonary:      Effort: Pulmonary effort is normal. No respiratory distress.   Abdominal:      General: Abdomen is flat. There is no distension.   Musculoskeletal:         General: Normal range of motion.      Cervical back: Normal range of motion.   Neurological:      General: No focal deficit present.      Mental Status:  He is alert and oriented to person, place, and time.   Psychiatric:         Mood and Affect: Mood normal.         Behavior: Behavior normal.             Significant Labs: All pertinent labs within the past 24 hours have been reviewed.  CBC:   Recent Labs   Lab 01/01/24  0457   WBC 8.26   HGB 12.0*   HCT 37.6*        CMP:   Recent Labs   Lab 01/01/24  0457      K 3.9      CO2 27   *   BUN 20   CREATININE 0.8   CALCIUM 8.5*   ANIONGAP 10       Significant Imaging: I have reviewed all pertinent imaging results/findings within the past 24 hours.

## 2024-01-03 NOTE — NURSING
[facility]  Nurses Note -- 4 Eyes      1/2/2024       Skin assessed on: Q Shift      [x] No Pressure Injuries Present    [x]Prevention Measures Documented    [] Yes LDA  for Pressure Injury Previously documented     [] Yes New Pressure Injury Discovered   [] LDA for New Pressure Injury Added      Attending RN:  SHAHNAZ FELTON, RN     Second RN:  Nirmala HICKMAN

## 2024-01-03 NOTE — ASSESSMENT & PLAN NOTE
Chronic, controlled. Latest blood pressure and vitals reviewed-     Temp:  [97.4 °F (36.3 °C)-98.5 °F (36.9 °C)]   Pulse:  [66-74]   Resp:  [18-19]   BP: (130-190)/()   SpO2:  [97 %-99 %] .   Home meds for hypertension were reviewed and noted below.   Hypertension Medications               trandolapril (MAVIK) 4 MG Tab 4 mg every morning.     verapamil (CALAN-SR) 240 MG CR tablet Take 240 mg by mouth every morning.             While in the hospital, will manage blood pressure as follows; Continue home antihypertensive regimen    Will utilize p.r.n. blood pressure medication only if patient's blood pressure greater than 180/110 and he develops symptoms such as worsening chest pain or shortness of breath.    Add HCTZ on 1/2

## 2024-01-03 NOTE — PROGRESS NOTES
[facility] Medicine  Telemedicine Progress Note    Patient Name: Sharita Gonzalez  MRN: [id number]  Patient Class: IP- Inpatient   Admission Date: 12/30/2023  Length of Stay: 3 days  Attending Physician: Mallory Awan MD  Primary Care Provider: Dawood Leo MD          Subjective:     Principal Problem:COVID-19 virus infection        HPI:  This is an 78-year-old male with a past medical history of hypertension,,SAH, PMR, Myasthenia gravis, history of prostate cancer, rectal cancer, tobacco use who presents with fevers.     Patient presents with fevers that started a day prior to presentation. He reports nasal congestion, rhinorrhea, and productive cough with green sputum over the last 3 weeks. He denies sick contacts.     In the ED, the patient was febrile (Tmax:  38.7), tachypneic, and saturating 91% on room air.  Labs were remarkable for positive COVID-19, elevated BNP (888), elevated troponin (0.18), elevated lactic acid (2.27 > 1.88) elevated CRP (62.7).  Chest x-ray showed no acute process.  He was given LR 1.8 L, Decadron 8 mg IV, DuoNeb x3 and Tylenol 1 g.  Patient was admitted for further management.    Overview/Hospital Course:  Mr. Gonzalez is a pleasant 78-year-old gentleman who is admitted to the hospital for COVID-19 infection.  He presented with complaints of cough with green phlegm, fevers, congestion.  Patient's chest x-ray showed no acute infiltrates.  Patient's oxygen saturation was 89% on room air.  The patient was placed on droplet and contact precautions, remdesivir, Decadron, low-flow oxygen, and will add Rocephin 1 g q.day. patient was given hydralazine p.r.n. for hypertension, developed redness of the face, possible rash allergic reaction.  Hydralazine was discontinued and his allergy list.  Patient also had elevated D-dimer 1.70, will check CTA chest and bilateral venous Dopplers.    Interval History: Pt noted to be awake, alert, conversant and calm. HDS and  afebrile. No acute events overnight. No new complaints this morning. Feels close to baseline. Wean off oxygen today. 6 min walk test today. Antitussives ordered.      Review of Systems   Constitutional:  Positive for activity change and fatigue. Negative for fever.   Respiratory:  Positive for cough. Negative for shortness of breath.    Cardiovascular:  Negative for chest pain.   Gastrointestinal:  Negative for abdominal pain.   Neurological:  Negative for dizziness and headaches.   Psychiatric/Behavioral:  Negative for confusion.    All other systems reviewed and are negative.    Objective:     Vital Signs (Most Recent):  Temp: 98.5 °F (36.9 °C) (01/02/24 2029)  Pulse: 72 (01/02/24 2029)  Resp: 18 (01/02/24 2029)  BP: 135/76 (01/02/24 2029)  SpO2: 97 % (01/02/24 2029) Vital Signs (24h Range):  Temp:  [97.4 °F (36.3 °C)-98.5 °F (36.9 °C)] 98.5 °F (36.9 °C)  Pulse:  [66-74] 72  Resp:  [18-19] 18  SpO2:  [97 %-99 %] 97 %  BP: (130-190)/() 135/76     Weight: 86.7 kg (191 lb 2.2 oz)  Body mass index is 31.81 kg/m².    Intake/Output Summary (Last 24 hours) at 1/2/2024 5235  Last data filed at 1/2/2024 1850  Gross per 24 hour   Intake 940 ml   Output --   Net 940 ml         Physical Exam  Vitals and nursing note reviewed.   Constitutional:       Appearance: Normal appearance.   HENT:      Head: Normocephalic and atraumatic.   Eyes:      Extraocular Movements: Extraocular movements intact.      Pupils: Pupils are equal, round, and reactive to light.   Cardiovascular:      Rate and Rhythm: Normal rate and regular rhythm.   Pulmonary:      Effort: Pulmonary effort is normal. No respiratory distress.   Abdominal:      General: Abdomen is flat. There is no distension.   Musculoskeletal:         General: Normal range of motion.      Cervical back: Normal range of motion.   Neurological:      General: No focal deficit present.      Mental Status: He is alert and oriented to person, place, and time.   Psychiatric:          Mood and Affect: Mood normal.         Behavior: Behavior normal.             Significant Labs: All pertinent labs within the past 24 hours have been reviewed.  CBC:   Recent Labs   Lab 01/01/24  0457   WBC 8.26   HGB 12.0*   HCT 37.6*        CMP:   Recent Labs   Lab 01/01/24  0457      K 3.9      CO2 27   *   BUN 20   CREATININE 0.8   CALCIUM 8.5*   ANIONGAP 10       Significant Imaging: I have reviewed all pertinent imaging results/findings within the past 24 hours.    Assessment/Plan:      * COVID-19 virus infection  Patient is identified as Severe COVID-19 based on hypoxemia with O2 saturations <94% on room air or on ambulation   Initiate standard COVID protocols; COVID-19 testing ,Infection Control notification  and isolation- respiratory, contact and droplet per protocol    Diagnostics: CBC, CMP, Ferritin, CRP, and Portable CXR    Management: Maintain oxygen saturations 92-96% via Nasal Cannula  LPM and monitor with continuous/intermittent pulse oximetry.  and Inhaled bronchodilators as needed for shortness of breath.      Acute respiratory failure with hypoxia due to COVID-19 infection    -admitted to the medical floor  -patient placed on COVID order set  -Decadron 6 mg IV daily  -Remdesivir  -6 min walk test    Myasthenia gravis  -Currently on decadron, continue Prednisone 15 mg on discharge   -follow-up with outpatient Neurology Clinic      Hypertension  Chronic, controlled. Latest blood pressure and vitals reviewed-     Temp:  [97.4 °F (36.3 °C)-98.5 °F (36.9 °C)]   Pulse:  [66-74]   Resp:  [18-19]   BP: (130-190)/()   SpO2:  [97 %-99 %] .   Home meds for hypertension were reviewed and noted below.   Hypertension Medications               trandolapril (MAVIK) 4 MG Tab 4 mg every morning.     verapamil (CALAN-SR) 240 MG CR tablet Take 240 mg by mouth every morning.             While in the hospital, will manage blood pressure as follows; Continue home antihypertensive  regimen    Will utilize p.r.n. blood pressure medication only if patient's blood pressure greater than 180/110 and he develops symptoms such as worsening chest pain or shortness of breath.    Add HCTZ on 1/2      VTE Risk Mitigation (From admission, onward)           Ordered     enoxaparin injection 90 mg  Every 12 hours         12/30/23 2213     IP VTE HIGH RISK PATIENT  Once         12/30/23 2031     Place sequential compression device  Until discontinued         12/30/23 2031                          I have completed this tele-visit without the assistance of a telepresenter.    The attending portion of this evaluation, treatment, and documentation was performed per Mallory Awan MD via Telemedicine AudioVisual using the secure Talkray software platform with 2 way audio/video. The provider was located off-site and the patient is located in the hospital. The aforementioned video software was utilized to document the relevant history and physical exam    Mallory Awan MD  Department of Hospital Medicine   [facility]

## 2024-01-03 NOTE — PLAN OF CARE
[facility] - Telemetry  Discharge Final Note    All CM needs met. Informed pt of recommendation for low intensity therapy, reviewed home health vs out patient therapy, pt stated he would prefer out patient therapy and confirmed he will have transportation to the clinic. Pt declined bath bench and stated he has a shower chair.Follow up appointment with pcp scheduled, listed on AVS. Pt's spouse to provide transportation home.     Primary Care Provider: Dawood Leo MD    Expected Discharge Date: 1/3/2024    Final Discharge Note (most recent)       Final Note - 01/03/24 1215          Final Note    Assessment Type Final Discharge Note (P)      Anticipated Discharge Disposition Home or Self Care (P)      Hospital Resources/Appts/Education Provided Provided patient/caregiver with written discharge plan information;Appointments scheduled and added to AVS (P)         Post-Acute Status    Discharge Delays None known at this time (P)                      Important Message from Medicare  Important Message from Medicare regarding Discharge Appeal Rights: Given to patient/caregiver, Explained to patient/caregiver, Other (comments) (CM spoke with patient via hospital room phone [phone], verbalized understanding. Copy mailed certified to address in chart. [phone])     Date IMM was signed: 01/02/24  Time IMM was signed: 2738    Contact Info       Dawood Leo MD   Specialty: Internal Medicine   Relationship: PCP - General    [place] [id number]   Phone: [phone]       Next Steps: Go on 1/8/2024    Instructions: Hospital follow up appointment @ 9:45am

## 2024-01-04 LAB
BACTERIA BLD CULT: NORMAL
BACTERIA BLD CULT: NORMAL

## 2024-01-04 NOTE — DISCHARGE SUMMARY
[facility] Medicine  Discharge Summary      Patient Name: Sharita Gonzalez  MRN: [id number]  Patient Class: IP- Inpatient  Admission Date: 12/30/2023  Hospital Length of Stay: 4 days  Discharge Date and Time: 1/3/2024  4:19 PM  Attending Physician: No att. providers found   Discharging Provider: Mallory Awan MD  Primary Care Provider: Dawood Leo MD      HPI:   This is an 78-year-old male with a past medical history of hypertension,,SAH, PMR, Myasthenia gravis, history of prostate cancer, rectal cancer, tobacco use who presents with fevers.     Patient presents with fevers that started a day prior to presentation. He reports nasal congestion, rhinorrhea, and productive cough with green sputum over the last 3 weeks. He denies sick contacts.     In the ED, the patient was febrile (Tmax:  38.7), tachypneic, and saturating 91% on room air.  Labs were remarkable for positive COVID-19, elevated BNP (888), elevated troponin (0.18), elevated lactic acid (2.27 > 1.88) elevated CRP (62.7).  Chest x-ray showed no acute process.  He was given LR 1.8 L, Decadron 8 mg IV, DuoNeb x3 and Tylenol 1 g.  Patient was admitted for further management.    * No surgery found *      Hospital Course:   Mr. Gonzalez is a pleasant 78-year-old gentleman who is admitted to the hospital for COVID-19 infection.  He presented with complaints of cough with green phlegm, fevers, congestion.  Patient's chest x-ray showed no acute infiltrates.  Patient's oxygen saturation was 89% on room air.  The patient was placed on droplet and contact precautions, remdesivir, Decadron, low-flow oxygen, and will add Rocephin 1 g q.day. patient was given hydralazine p.r.n. for hypertension, developed redness of the face, possible rash allergic reaction.  Hydralazine was discontinued and his allergy list.  Patient also had elevated D-dimer 1.70, will check CTA chest and bilateral venous Dopplers. He improved with supportive care and was found to  be suitable for discharge home without oxygen.      Goals of Care Treatment Preferences:  Code Status: Full Code      Consults:   Consults (From admission, onward)          Status Ordering Provider     Inpatient virtual consult to Hospital Medicine  Once        Provider:  Mallory Awan MD    Completed HOUTSON BARNHART            No new Assessment & Plan notes have been filed under this hospital service since the last note was generated.  Service: Hospital Medicine    Final Active Diagnoses:    Diagnosis Date Noted POA    PRINCIPAL PROBLEM:  COVID-19 virus infection [U07.1] 12/30/2023 Yes    Myasthenia gravis [G70.00] 07/12/2019 Yes    Hypertension [I10]  Yes      Problems Resolved During this Admission:       Discharged Condition: stable    Disposition: Home or Self Care    Follow Up:   Follow-up Information       Dawood Leo MD. Go on 1/8/2024.    Specialty: Internal Medicine  Why: Hospital follow up appointment @ 9:45am  Contact information:  [address] [place] [address]  [phone]               [facility] Follow up.    Why: Out patient physicial therapy  Contact information:  [address] [place] [address] ·   [phone]                         Patient Instructions:      Diet Cardiac     Notify your health care provider if you experience any of the following:  temperature >100.4     Notify your health care provider if you experience any of the following:  persistent nausea and vomiting or diarrhea     Notify your health care provider if you experience any of the following:  severe uncontrolled pain     Notify your health care provider if you experience any of the following:  redness, tenderness, or signs of infection (pain, swelling, redness, odor or green/yellow discharge around incision site)     Notify your health care provider if you experience any of the following:  difficulty breathing or increased cough     Notify your health care provider if you experience any  of the following:  severe persistent headache     Notify your health care provider if you experience any of the following:  worsening rash     Notify your health care provider if you experience any of the following:  persistent dizziness, light-headedness, or visual disturbances     Notify your health care provider if you experience any of the following:  increased confusion or weakness     Send follow up & questions to   Order Comments: Patient's primary care physician: Dawood Leo MD     Activity as tolerated       Significant Diagnostic Studies: Labs: CMP   Recent Labs   Lab 01/03/24  0457      K 3.8      CO2 26   *   BUN 25*   CREATININE 1.0   CALCIUM 8.8   ANIONGAP 10    and CBC   Recent Labs   Lab 01/03/24  0457   WBC 6.33   HGB 11.5*   HCT 35.4*          Pending Diagnostic Studies:       None           Medications:  Reconciled Home Medications:      Medication List        START taking these medications      benzonatate 100 MG capsule  Commonly known as: TESSALON  Take 1 capsule (100 mg total) by mouth 3 (three) times daily as needed.     dextromethorphan-guaiFENesin  mg/5 ml  mg/5 mL liquid  Commonly known as: ROBITUSSIN-DM  Take 5 mLs by mouth every 6 (six) hours as needed.     doxazosin 1 MG tablet  Commonly known as: CARDURA  Take 1 tablet (1 mg total) by mouth once daily.     hydroCHLOROthiazide 12.5 MG Tab  Commonly known as: HYDRODIURIL  Take 1 tablet (12.5 mg total) by mouth once daily.            CHANGE how you take these medications      predniSONE 5 MG tablet  Commonly known as: DELTASONE  Take 2 tablets (10 mg total) by mouth once daily.  What changed:   how much to take  Another medication with the same name was removed. Continue taking this medication, and follow the directions you see here.            CONTINUE taking these medications      aspirin 81 MG EC tablet  Commonly known as: ECOTRIN  Take 81 mg by mouth once daily.     diclofenac sodium 1 %  Gel  Commonly known as: VOLTAREN  Apply 2 g topically once daily.     gabapentin 100 MG capsule  Commonly known as: NEURONTIN  Take 300 mg by mouth every evening.     meloxicam 15 MG tablet  Commonly known as: MOBIC  Take 1 tablet (15 mg total) by mouth once daily.     multivitamin capsule  Take 1 capsule by mouth once daily.     trandolapriL 4 MG Tab  Commonly known as: MAVIK  4 mg every morning.     venlafaxine 75 MG 24 hr capsule  Commonly known as: EFFEXOR-XR  Take 75 mg by mouth.     verapamiL 240 MG CR tablet  Commonly known as: CALAN-SR  Take 240 mg by mouth every morning.              Indwelling Lines/Drains at time of discharge:   Lines/Drains/Airways       None                   Time spent on the discharge of patient: 39 minutes         The attending portion of this evaluation, treatment, and documentation was performed per Mallory Awan MD via Telemedicine AudioVisual using the secure Qoopl software platform with 2 way audio/video. The provider was located off-site and the patient is located in the hospital. The aforementioned video software was utilized to document the relevant history and physical exam    Mallory Awan MD  Department of Hospital Medicine  [facility]

## 2024-01-09 NOTE — PHYSICIAN QUERY
"PT Name: Sharita Gonzalez  MR #: [id number]     DOCUMENTATION CLARIFICATION      CDS/: Nicole Braga RN CDI        Contact information: [email or web address]    This form is a permanent document in the medical record.     Query Date: January 9, 2024    Dear Provider,  By submitting this query, we are merely seeking further clarification of documentation.  Please utilize your independent clinical judgment when addressing the question(s) below.     The Medical Record contains the following:    Supporting Clinical Findings Location in Medical Record    Patient is seen for follow-up care.  Patient being treated for COVID-19 infection and URI/early pneumonia with cough and green phlegm.  Patient is on Rocephin, Decadron, remdesivir, and low-flow oxygen.    Effort: Pulmonary effort is normal.    Breath sounds: Normal breath sounds.    H med 1/1 BRAD Sales MD   Chest x-ray showed no acute process.  H&P O Peterson [facility]      No CTA evidence of pulmonary embolus.  Otherwise negative CT of the chest.  1/2 radiology     WBC  12/31  7.87  1/1      8.26  1/3      6.33   Lab     Ceftriaxone 12/31-1/3   Med sheets     Please clarify if the " Pneumonia" diagnosis has been:    [ x ] COVID-19 Pneumonia Ruled In     [  ] Pneumonia unspecified, Ruled In     [  ] Pneumonia Ruled Out     [  ] Other/Clarification of findings (please specify): _______________      [   ] Clinically undetermined         Please document in your progress notes daily for the duration of treatment, until resolved, and include in your discharge summary.    Form No. [id number]                                                                            "

## 2024-01-09 NOTE — PHYSICIAN QUERY
PT Name: Sharita Gonzalez  MR #: [id number]     DOCUMENTATION CLARIFICATION     CDS/: Nicole Braga RN CDI          Contact information: [email or web address]  This form is a permanent document in the medical record.     Query Date: January 9, 2024    By submitting this query, we are merely seeking further clarification of documentation.  Please utilize your independent clinical judgment when addressing the question(s) below.  The Medical Record contains the following   Indicators   Supporting Clinical Findings Location in Medical Record   x Documentation of Respiratory Failure, ARDS Acute respiratory failure with hypoxia due to COVID-19 infection    H&P O Peterson [facility]Z   x Subjective Respiratory Signs/Symptoms Positive for cough and shortness of breath.   H&P O Peterson [facility]OR[facility]Z   x Objective Respiratory Signs/Symptoms  presents to the ED with URI symptoms x3 weeks. Patient reports congestion, productive cough w/ green mucus   Decreased breath sounds bilaterally, wheezes   ER provider B Haroldo MORALEZ   x RR     O2 sat     O2 use   Patient was reassessed and O2 sat 89%.  Placed on 2 L.  O2 sats 94 95%.  I was able to review old O2 levels where he was normally between 94 96%     12/30    24-18    89-98    2L NC  12/31    18         94-97    room air  1/1 18-19    96-99    1 L NC  1/2        18-20    96-97    2L NC  1/3        18         93         room air   ER provider MAYELA Zarco MD        Vitals     x Blood Gas (ABG or VBG)   12/30/23 1432 Venous gas   POC PH 7.376   POC PCO2 54.8 High    POC PO2 23 Low Panic    POC HCO3 32.2 High    POC BE 5 High    POC SATURATED O2 36    Blood gases     x Hypoxia/Hypercapnia Hypoxia   ER provider MAYELA Zarco MD    BiPAP/Intubation/Mechanical Ventilation      Home O2, Oxygen Dependence     x Acute/Chronic Illness Covid-19  Myasthenia gravis  HTN   H&P O Peterson MORALEZ   x Treatment  Placed on 2 L    . -admitted to the medical floor    -patient placed on COVID order set    -Decadron 6 mg IV daily     -Remdesivir ER provider MAYELA Zarco MD    H&P O Peterson MORALEZ      Other         The clinical guidelines noted are only a system guideline. It does not replace the providers clinical judgment.    [facility] Approved Diagnostic Criteria      Acute Respiratory Failure    Hypoxic: ABG pO2<60 mmHg or O2 sat of <91% on RA   AND/OR   Hypercapnic: ABG pCO2>50 mmHg with pH <7.35   AND   Respiratory symptoms documented (Subjective: SOB; Objective: Tachypnea, respiratory distress, increased work of breathing, unable to speak in complete sentences, labored breathing, use of accessory muscles, RR>26, cyanosis, dyspnea, wheezing, stridor, lethargy)      Chronic Respiratory Failure   Hypoxic: Continuous home oxygen    AND/OR   Hypercapnic: Normal pH with high CO2 (ex. COPD)      Acute on Chronic Respiratory Failure   Hypoxic: ABG pO2>10mmHg below baseline OR ABG pO2<60 mmHg OR SpO2<91% on usual home O2  OR O2>2L/min over baseline home O2   AND/OR   Hypercapnic: ABG pCO2>50 mmHg OR pCO2>10mmHg over baseline and pH <7.35   AND   Respiratory symptoms documented      Acute Respiratory Distress Syndrome (ARDS) - an acute, diffuse, inflammatory form of lung injury. Suspect with progressive dyspnea, hypoxemic respiratory failure, and bilateral alveolar infiltrates on chest imaging within 6 to 72 hours of an inciting event.   Acute Respiratory Distress - Generally describes less severe respiratory symptoms (tachypnea, in respiratory distress, increased work of breathing, unable to speak in complete sentences, labored breathing, use of accessory muscles, RR> 24, cyanosis, dyspnea, wheezing, stridor, lethargy) without sufficient measurements (pO2, SpO2, pH, and pCO2) to meet criteria for respiratory failure)   Acute Respiratory Insufficiency - Generally describes less severe respiratory symptoms and measurements (pO2, SpO2, pH, and pCO2) not meeting criteria for respiratory failure         Due to the conflicting clinical picture,  "please clinically validate the diagnosis of "Acute respiratory failure with hypoxia due to COVID-19 infection".    If validated, please provide additional clinical support for the diagnosis.     [  x  ] Above stated diagnosis is not confirmed and/or it has been ruled out     [    ] Above stated diagnosis is not confirmed and/or it has been ruled out,       (  ) Hypoxia ruled in      (  ) Other (please specify):_______________       [    ] Acute Respiratory Failure with Hypoxia diagnosis is confirmed and additional clinical support/decision-making indicators for the diagnosis include (please specify):_______________________________________________     [    ] Other clarification (please specify): ___________________       Please document in your progress notes daily for the duration of treatment until resolved and include in your discharge summary.     Reference:    TENZIN Patel MD. (2020, March 13). Acute respiratory distress syndrome: Clinical features, diagnosis, and complications in adults ([id number] MOE Pratt MD & [id number] FRANKIE Hicmkan MD, Eds.). Retrieved November 13, 2020, from https://www.[employer]ute[employer].com/contents/acute-respiratory-distress-syndrome-clinical-features-diagnosis-and-complications-in-adults?search=ards&source=search_result&selectedTitle=1~150&usage_type=default&display_rank=1  Form No. [id number]  "

## 2024-02-06 ENCOUNTER — TELEPHONE (OUTPATIENT)
Dept: NEUROLOGY | Facility: CLINIC | Age: 79
End: 2024-02-06
Payer: MEDICARE

## 2024-02-06 NOTE — TELEPHONE ENCOUNTER
----- Message from Suki Crowell sent at 2024  4:31 PM CST -----  Regarding: no availability  Contact: Pt @ [phone]  Pt called in to schedule an appt; no available appts in Epic. Pt is asking for a call back soon to schedule. Thanks.         Reason appt not schedule: no availability          Patient's DX: myasthenia gravis         Patient requesting call back or [facility] [phone]

## 2024-02-07 ENCOUNTER — TELEPHONE (OUTPATIENT)
Dept: NEUROLOGY | Facility: CLINIC | Age: 79
End: 2024-02-07
Payer: MEDICARE

## 2024-02-07 NOTE — TELEPHONE ENCOUNTER
"Pt dx in 2018 with MG, formerly saw Dr Cuellar, the after departure, spent last 3 yrs with Dr Dash yet feels has been "written off" by  his doctor. Pt is seeking second opinion and transfer of care.  Spoke with the patient. Offered next available appointment with Dr Muro this Friday at 9 am. Pt accepted next available, verbalized understanding, and repeated back date/time/location of scheduled appointment.     "

## 2024-02-09 ENCOUNTER — LAB VISIT (OUTPATIENT)
Dept: LAB | Facility: HOSPITAL | Age: 79
End: 2024-02-09
Attending: PSYCHIATRY & NEUROLOGY
Payer: MEDICARE

## 2024-02-09 ENCOUNTER — OFFICE VISIT (OUTPATIENT)
Dept: NEUROLOGY | Facility: CLINIC | Age: 79
End: 2024-02-09
Payer: MEDICARE

## 2024-02-09 VITALS
WEIGHT: 187.63 LBS | DIASTOLIC BLOOD PRESSURE: 81 MMHG | HEIGHT: 65 IN | SYSTOLIC BLOOD PRESSURE: 129 MMHG | HEART RATE: 67 BPM | BODY MASS INDEX: 31.26 KG/M2

## 2024-02-09 DIAGNOSIS — M25.611 DECREASED RANGE OF MOTION OF RIGHT SHOULDER: ICD-10-CM

## 2024-02-09 DIAGNOSIS — G70.00 MYASTHENIA GRAVIS: ICD-10-CM

## 2024-02-09 DIAGNOSIS — G70.00 MYASTHENIA GRAVIS: Primary | ICD-10-CM

## 2024-02-09 DIAGNOSIS — M62.81 PROXIMAL MUSCLE WEAKNESS: ICD-10-CM

## 2024-02-09 PROCEDURE — 99999 PR PBB SHADOW E&M-EST. PATIENT-LVL III: CPT | Mod: PBBFAC,,, | Performed by: PSYCHIATRY & NEUROLOGY

## 2024-02-09 PROCEDURE — 99205 OFFICE O/P NEW HI 60 MIN: CPT | Mod: S$GLB,,, | Performed by: PSYCHIATRY & NEUROLOGY

## 2024-02-09 PROCEDURE — 3288F FALL RISK ASSESSMENT DOCD: CPT | Mod: CPTII,S$GLB,, | Performed by: PSYCHIATRY & NEUROLOGY

## 2024-02-09 PROCEDURE — G2211 COMPLEX E/M VISIT ADD ON: HCPCS | Mod: S$GLB,,, | Performed by: PSYCHIATRY & NEUROLOGY

## 2024-02-09 PROCEDURE — 36415 COLL VENOUS BLD VENIPUNCTURE: CPT | Performed by: PSYCHIATRY & NEUROLOGY

## 2024-02-09 PROCEDURE — 3074F SYST BP LT 130 MM HG: CPT | Mod: CPTII,S$GLB,, | Performed by: PSYCHIATRY & NEUROLOGY

## 2024-02-09 PROCEDURE — 83519 RIA NONANTIBODY: CPT | Performed by: PSYCHIATRY & NEUROLOGY

## 2024-02-09 PROCEDURE — 3079F DIAST BP 80-89 MM HG: CPT | Mod: CPTII,S$GLB,, | Performed by: PSYCHIATRY & NEUROLOGY

## 2024-02-09 PROCEDURE — 1160F RVW MEDS BY RX/DR IN RCRD: CPT | Mod: CPTII,S$GLB,, | Performed by: PSYCHIATRY & NEUROLOGY

## 2024-02-09 PROCEDURE — 1101F PT FALLS ASSESS-DOCD LE1/YR: CPT | Mod: CPTII,S$GLB,, | Performed by: PSYCHIATRY & NEUROLOGY

## 2024-02-09 PROCEDURE — 1159F MED LIST DOCD IN RCRD: CPT | Mod: CPTII,S$GLB,, | Performed by: PSYCHIATRY & NEUROLOGY

## 2024-02-09 PROCEDURE — 86366 MUSCLE-SPECIFIC KINASE ANTB: CPT | Performed by: PSYCHIATRY & NEUROLOGY

## 2024-02-09 PROCEDURE — 1126F AMNT PAIN NOTED NONE PRSNT: CPT | Mod: CPTII,S$GLB,, | Performed by: PSYCHIATRY & NEUROLOGY

## 2024-02-09 RX ORDER — PREGABALIN 100 MG/1
100 CAPSULE ORAL NIGHTLY
COMMUNITY
End: 2024-02-23 | Stop reason: SDUPTHER

## 2024-02-09 RX ORDER — EZETIMIBE 10 MG/1
10 TABLET ORAL DAILY
COMMUNITY

## 2024-02-09 RX ORDER — LOSARTAN POTASSIUM 100 MG/1
100 TABLET ORAL DAILY
COMMUNITY

## 2024-02-09 NOTE — PATIENT INSTRUCTIONS
"Myasthenia Gravis IMPORTANT INFORMATION:     Elective intubation should be considered if serial measurements of the VC show values less than 20 mL/kg or if the NIF is worse than -30 cm H20 or is progressively worsening after serial evaluation.        Absolute Contraindicated Medications (Category 1) Contraindicated Medications (Category 2) Likely to Worsen MG Cautionary Medications  (Category 3) That May Worsen MG but are Usually Tolerated   Curare  Botulinum toxin  D-penicillamine  Interferon alpha     Aminoglycoside (Gentamycin, Kanamycin, Streptomycin, Neomycin, Tobramycin)  Macrolide (Erythromycin, Azithromycin, Telithromycin, Biaxin, Clarithromycin)  Fluoroquinolone - (Ciprofloxacin, Moxifloxacin, Levofloxacin, Delafloxacin, Norfloxacin, Prulifloxacin)  Quinine (Use only for Malaria)  Quinidine  Procainamide  Magnesium salts, IV magnesium replacement  Chloroquine  Hydroxychloroquine  Immune checkpoint inhibitors: Pembrolizumab (Keytruda), Nivolumab (Opdivo), Atezolizumab (Tecentriq), Avelumab (Bavencio), Durvalumab (Imfinzi), Ipilimumab (Yervoy)    Atropine   Corticosteroids  Desferrioxamine  Beta blockers  Statins  Iodinated radiologic contrast agents  Lithium  Benzodiazepines   Calcium Channel Blockers (verapamil)   Doxacurium  Neuromuscular blockades (Succinylcholine, Cisatracurium, Rocuronium, Vecuronium, Atracurium)  Diclomine         THIS PATIENT HAS MYASTHENIA GRAVIS     USE THESE DRUGS WITH CAUTION   1. Antibiotics of the Following Classes               A. Aminoglycosides (end in "mycin", "micin" e.g. Neomycin, tobramycin, gentamicin)               B. Flagyl (metronidazole)               C. Macrolides (e.g. Erythromycin, azithromycin (Zithromax), clarithromycin)   2. Beta Blockers (oral, parenteral and ophthalmic preparations)               A. (end in "olol" e.g. Atenolol, labetalol, metoprolol, propranolol, sotalol, timolol, etc)   3. Calcium Channel Blockers (end in "ipine" e.g. Amlodipine (Norvasc), " "nicardipine, nifedipine (Procardia), felodipine (Plendil))   4. Corticosteroids & ACTH   5. Interferons   6. Magnesium   7. Narcotic analgesics (if there is respiratory compromise e.g. Demerol, morphine)   8. Phenothiazines (end in "zine" e.g. Compazine, chlorpromazine (Thorazine), fluphenazine (Prolixin), perphenazine (Trilafon), Sparine)   9. Respiratory Depressants   10. Sedatives and Hypnotics       THESE DRUGS SHOULD *NOT* BE USED IN PATIENTS WITH MYASTHENIA GRAVIS WITHOUT PRIOR DISCUSSION WITH A NEUROMUSCULAR SPECIALIST   1. Ketolides (e.g. Telithromycin) - FDA BLACK BOX WARNING - Do NOT Use   2. Fluoroquinolones (end in "acin" e.g. Levofloxacin (Levaquin), ciprofloxacin (CIPRO), moxifloxacin (Avelox) - FDA BLACK BOX WARNING   3. Botulinum toxin   4. D-Penicillamine       NURSES -- TRIPLE CHECK BEFORE GIVING THE FOLLOWING DUE TO THE HIGH PROBABILITY OF PROLONGED WEAKNESS OR MG CRISIS   1. Neuromuscular blocking agent (both depolarizing and non-depolarizing)               A. Succinylcholine-like               B. Curare-like   2. Quinidine   3. Quinine   "

## 2024-02-09 NOTE — PROGRESS NOTES
[facility] - NEUROLOGY [address]TH [address] [facility], [place]    Date: 2/9/24  Patient Name: Sharita Gonzalez   MRN: [id number]   Referring Provider: Self, Aaareferral    Thank you so much Self, Aaareferral for your patient referral to Neuromuscular team at [facility]. We take pride in our care coordination and look forward to your feedback and questions.    Assessment:   Sharita Gonzalez is a 78 y.o. male is a very pleasant patient with proximal muscular weakness with questionable bulbar involvement with concern for myasthenia gravis who previously tried on Vyvgart with no clear response for evaluation.  He is currently on prednisone for long period of time which could be partly a reason for proximal muscle weakness.  I will complete his myasthenia gravis workup including NCS with repetitive stimulation.  I will additionally do pulmonary function test to understand his shortness of breath which could be partly related with his heart failure.    I discussed about fall precautions.I addressed his complaints. I provided information about fall precautions and healthy lifestyle. I would wish him very best for improvement/recovery in his condition.    Future direction based on feedback:    Plan:     Problem List Items Addressed This Visit          Neuro    Myasthenia gravis - Primary    Overview     Dx 2019; neuro Dr Shrestha ([facility])         Relevant Orders    Myasthenia Gravis Eval With Musk Reflex    EMG W/ ULTRASOUND AND NERVE CONDUCTION TEST 3 Extremities    Complete PFT w/ bronchodilator       Orthopedic    Proximal muscle weakness    Decreased range of motion of right shoulder       Wayne Muro MD    This evaluation was completed in >75  Minutes over 50% of the time spent on education & counseling. This includes face to face time and non-face to face time preparing to see the patient (eg, review of tests), obtaining and/or reviewing separately obtained history, documenting  "clinical information in the electronic or other health record, independently interpreting results and communicating results to the patient/family/caregiver, or care coordinator.    Visit today is associated with current or anticipated ongoing medical care related to this patient's single serious condition/complex condition (proximal muscle weakness with concern for myasthenia gravis). Follow up:  6 months    Patient note was created using MModal Dictation.  Any errors in syntax or even information may not have been identified and edited on initial review prior to signing this note.    Details provided by:    Patient  Family- Wife (Kylie)    Reason for visit:  Concern for myasthenia gravis    HISTORY OF PRESENT ILLNESS   Mr. Sharita Gonzalez is a 78 y.o. male presenting for evaluation of probable myasthenia gravis.  He has PMH of SAH, left carpal tunnel syndrome, HTN, PMR, prostate cancer (no chemotherapy), right shoulder rotator cuff injury, rectal cancer (Brachy therapy). Diagnosed with myasthenia gravis in 2018. formerly saw Dr Cuellar, then after departure, spent last 3 yrs with Dr Dash yet feels has been "written off" by  his doctor.     The detail was confirmed with the patient who mentioned left eyelid droop with left facial weakness in 2018 when he was diagnosed with myasthenia gravis but no workup is available for review.  He initially noticed difficulty in lower extremities with difficulty going upstairs especially while lifting groceries.  His house has 14 steps of stairs.  He mentioned occasional episodes of uncomfortable swallowing which started with liquids but it can happen with Solids too but no particular fatigability to meat.  He noticed shortness of breath on exertion after walking for 20 minutes and he has to stop and then he can walk again.  He has been using cane for past 1 year.  He had 4 falls a year ago but no fall so far.  He mentioned about tripping frequently.  There is no particular " mentioned of double vision.    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  There is no history of smoking or alcohol use.  He used Vyvgart twice in the past with some help.    Myasthenia Gravis Activities of Daily Living Scale     Grade   Function 0 1 2 3   Double Vision None Occasional, not every day Daily, but not constant Constant    0      Eyelid Droop None Occasional, not every day Daily, but not constant Constant    0      Talking Normal Intermittent slurring or nasal speech Constant slurring or nasal speech, but can be understood Speech difficult to understand    0      Chewing Normal Fatigues with solid food Fatigues with soft food Gastric tube    0      Swallowing Normal Chokes rarely Frequent choking requiring change of diet Gastric tube    0      Breathing Normal Shortness of breath on exertion Shortness of breath at rest Ventilator     1     Brushing teeth or hair Normal Requires extra effort but requires no rest period Rest periods needed Cannot do one or more of these functions    0      Ability to rise from chair or toilet Normal Sometimes uses arms Always uses arms Requires assistance     1     Total MG ADL Score 2        Pertinent work up based on chart review for current condition:  CBC with hemoglobin 11.5, hematocrit 35, MCV 96   Basic metabolic panel with creatinine 1.0  C-reactive protein 70   Hemoglobin A1c of 5.9   Brain natriuretic peptide 1070 high   Troponin I-- 0.1 high   CK 29   TSH 2.0     NCS/EMG on 03/23/2018   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    Carotid ultrasound on 11/30/2023.    1.   There is densely calcified plaque bilaterally as detailed above. There is no sonographic evidence of a hemodynamically significant stenosis (less than 50%).   2.   There is antegrade flow in both vertebral arteries.     CT cervical spine on 08/04/2023.    No acute intracranial hemorrhage.  Multiple age-indeterminate lacunar infarcts. No acute cervical fracture.   Spondylitic changes.  Straightening of the normal cervical lordosis.    CT head on 08/04/2023.    No acute intracranial hemorrhage.  Multiple age-indeterminate lacunar infarcts. No acute cervical fracture.  Spondylitic changes.  Straightening of the normal cervical lordosis.    Review of Systems:  12 system review of systems is negative except for the symptoms mentioned in HPI.       Past Medical History Social History   Past Medical History:   Diagnosis Date    Carpal tunnel syndrome     Hypertension     Polymyalgia     Prostate cancer       Social History     Socioeconomic History    Marital status:    Tobacco Use    Smoking status: Light Smoker     Types: Cigars    Smokeless tobacco: Never    Tobacco comments:     smokes a cigar once a month   Substance and Sexual Activity    Alcohol use: No     Comment: occasional beer     Drug use: No        Family History Past Surgical History   Family History   Problem Relation Age of Onset    Anesthesia problems Neg Hx     Broken bones Neg Hx     Cancer Neg Hx     Clotting disorder Neg Hx     Collagen disease Neg Hx     Diabetes Neg Hx     Dislocations Neg Hx     Osteoporosis Neg Hx     Rheumatologic disease Neg Hx     Scoliosis Neg Hx     Severe sprains Neg Hx      Past Surgical History:   Procedure Laterality Date    BRAIN SURGERY  age 27    subarrachnoid brain hemorhage     CARPAL TUNNEL RELEASE Right 7/9/2018    Procedure: RELEASE, CARPAL TUNNEL - RIGHT;  Surgeon: Christine Simpson MD;  Location: [facility];  Service: Orthopedics;  Laterality: Right;  Stretcher; Supine; Hand pan 1 & Pan 2    CATARACT EXTRACTION      EYE SURGERY      FINGER SURGERY  9-17-13    left TFR    HEMORRHOID SURGERY      radiation seeds   2008    prostate cancer    SHOULDER SURGERY Right     rotator        Allergies    Review of patient's allergies indicates:   Allergen Reactions    Statins-hmg-coa reductase inhibitors      Back ache with Lipitor     Adhesive Rash     Pulls skin off    May  "use paper tape    Hydralazine analogues Rash    Latex, natural rubber Rash            Medications     Current Outpatient Medications   Medication Sig Dispense Refill    aspirin (ECOTRIN) 81 MG EC tablet Take 81 mg by mouth once daily.      diclofenac sodium (VOLTAREN) 1 % Gel Apply 2 g topically once daily. 100 g 3    doxazosin (CARDURA) 1 MG tablet Take 1 tablet (1 mg total) by mouth once daily. 30 tablet 11    gabapentin (NEURONTIN) 100 MG capsule Take 300 mg by mouth every evening.       hydroCHLOROthiazide (HYDRODIURIL) 12.5 MG Tab Take 1 tablet (12.5 mg total) by mouth once daily. 30 tablet 11    meloxicam (MOBIC) 15 MG tablet Take 1 tablet (15 mg total) by mouth once daily. 20 tablet 0    multivitamin capsule Take 1 capsule by mouth once daily.      predniSONE (DELTASONE) 5 MG tablet Take 2 tablets (10 mg total) by mouth once daily. 60 tablet 2    trandolapril (MAVIK) 4 MG Tab 4 mg every morning.       venlafaxine (EFFEXOR-XR) 75 MG 24 hr capsule Take 75 mg by mouth.      verapamil (CALAN-SR) 240 MG CR tablet Take 240 mg by mouth every morning.        No current facility-administered medications for this visit.         PHYSICAL EXAMINATION     Vitals:    02/09/24 0859   BP: 129/81   Pulse: 67   Weight: 85.1 kg (187 lb 9.8 oz)   Height: 5' 5" (1.651 m)     Wt Readings from Last 3 Encounters:   01/03/24 0500 86.2 kg (190 lb)   01/02/24 0345 86.7 kg (191 lb 2.2 oz)   12/30/23 1959 86.8 kg (191 lb 4.8 oz)   12/30/23 1359 84.8 kg (187 lb)   08/04/23 2000 81.6 kg (180 lb)   04/04/23 0828 86.1 kg (189 lb 13.1 oz)     Body mass index is 31.22 kg/m².     Patient was examined in gown.    GENERAL/CONSTITUTIONAL/SYSTEMIC:    -Well appearing; well nourished    Head: Atraumatic, normocephalic  HEENT: PERRLA, EOMI, Oral mucosa moist   Neck: Supple, trachea midline  Cardiovascular: Regular rate and rhythm.  Pulmonary: CTAB, no increased work of breathing, no rhonchi or wheezing  Extremities: Warm, well-perfused, no " significant edema  Psychiatric: Normal mood & affect; behavior normal & appropriate  Skin: No jaundice, rashes    HIGHER INTEGRATIVE FUNCTIONS:   -Attention & concentration: Normal   -Orientation: Oriented to person, place & time  -Memory: Normal  -Language: Normal   -Fund of Knowledge: Normal    CRANIAL NERVES:   -CN 2: Visual fields full  -CN 2,3: PERRL  -CN 3,4,6: EOMI with no clear ptosis  -CN 5: Facial sensation intact bilaterally, jaw jerk negative  -CN 7: Facial strength/movement intact bilaterally.  No weakness on puffing of cheeks and eye closure.  -CN 8: Hearing normal bilaterally  -CN 9,10: Palate elevates symmetrically  -CN 11: Normal shoulder shrug and head turn, no neck flexion/extension strength weakness  -CN 12: Tongue protrudes midline with normal tongue movements    MOTOR:   -Tone: normal in upper and lower extremities  -UE/LE motor:     -  Upper Ext Right Left Lower Ext Right Left   Shoulder Abd 4 4 Hip flexion 5 5   Elbow flexion 5 5 Knee extension 5 5   Elbow extension 5 5 Knee flexion 5 5   Fingers abduction 5 5 Ankle dorsiflexion 5 5   Wrist extension   Ankle plantar flexion     Wrist flexion   Great toe dorsiflexion     Finger extension   Thigh adduction     Finger flexion   Thigh abduction     Thumb abduction            REFLEXES:      R L  R L   Triceps 3 3 Knee 3 2   Biceps 3 3 Ankle 3 2   BR 3 3        -Flexor plantar reflex bilaterally     SENSATION:   -Intact bilaterally to Vibration and pin prick    COORDINATION:   -FNF normal bilaterally    GAIT:   -Normal casual gait. Able to stand on heels and toes without difficulty.  -He is unable to stand after squatting.      Scheduled Follow-up :  Future Appointments   Date Time Provider Department Center   2/9/2024  9:00 AM Wayne Muro MD [facility] NEURO [facility]       After Visit Medication List :     Medication List            Accurate as of February 9, 2024  8:19 AM. If you have any questions, ask your nurse or doctor.                 CONTINUE taking these medications      aspirin 81 MG EC tablet  Commonly known as: ECOTRIN     diclofenac sodium 1 % Gel  Commonly known as: VOLTAREN  Apply 2 g topically once daily.     doxazosin 1 MG tablet  Commonly known as: CARDURA  Take 1 tablet (1 mg total) by mouth once daily.     gabapentin 100 MG capsule  Commonly known as: NEURONTIN     hydroCHLOROthiazide 12.5 MG Tab  Commonly known as: HYDRODIURIL  Take 1 tablet (12.5 mg total) by mouth once daily.     meloxicam 15 MG tablet  Commonly known as: MOBIC  Take 1 tablet (15 mg total) by mouth once daily.     multivitamin capsule     predniSONE 5 MG tablet  Commonly known as: DELTASONE  Take 2 tablets (10 mg total) by mouth once daily.     trandolapriL 4 MG Tab  Commonly known as: MAVIK     venlafaxine 75 MG 24 hr capsule  Commonly known as: EFFEXOR-XR     verapamiL 240 MG CR tablet  Commonly known as: CALAN-SR              Signing Physician:          Wayne Muro MD  , [employer] [facility] [employer] ([place]  Neurology Consultant. [facility].   [address]. [address]th [place] [address].

## 2024-02-15 LAB — MUSK ANTIBODY TEST: 0 NMOL/L (ref 0–0.02)

## 2024-02-16 LAB
ACHR BIND AB SER-SCNC: 0 NMOL/L
MG INTERPRETIVE COMMENTS: NORMAL

## 2024-02-23 RX ORDER — PREGABALIN 50 MG/1
100 CAPSULE ORAL NIGHTLY
COMMUNITY
Start: 2024-01-11 | End: 2024-02-23 | Stop reason: SDUPTHER

## 2024-02-23 RX ORDER — PREGABALIN 100 MG/1
100 CAPSULE ORAL NIGHTLY
Qty: 30 CAPSULE | Refills: 0 | Status: SHIPPED | OUTPATIENT
Start: 2024-02-23 | End: 2024-04-26

## 2024-02-24 ENCOUNTER — PATIENT MESSAGE (OUTPATIENT)
Dept: NEUROLOGY | Facility: CLINIC | Age: 79
End: 2024-02-24
Payer: MEDICARE

## 2024-02-28 ENCOUNTER — TELEPHONE (OUTPATIENT)
Dept: NEUROLOGY | Facility: CLINIC | Age: 79
End: 2024-02-28
Payer: MEDICARE

## 2024-04-26 RX ORDER — PREGABALIN 100 MG/1
CAPSULE ORAL
Qty: 30 CAPSULE | Refills: 3 | Status: SHIPPED | OUTPATIENT
Start: 2024-04-26

## 2024-08-08 ENCOUNTER — TELEPHONE (OUTPATIENT)
Dept: NEUROLOGY | Facility: CLINIC | Age: 79
End: 2024-08-08
Payer: MEDICARE

## 2024-08-13 ENCOUNTER — TELEPHONE (OUTPATIENT)
Dept: NEUROLOGY | Facility: CLINIC | Age: 79
End: 2024-08-13
Payer: MEDICARE

## 2024-08-13 NOTE — TELEPHONE ENCOUNTER
----- Message from Edwige Huang MA sent at 8/8/2024 12:08 PM CDT -----  Regarding: FW: appt  Contact: @ [phone]  Please advise when I can schedule appt for this pt . Pt is requesting virtual appt and EMG appt, Im unsure if he wanted that at the same time when I called and spoke with him but I did inform him that we will reach out directly when appt become available to schedule those appt.  ----- Message -----  From: Michelle Shelley  Sent: 8/7/2024   4:17 PM CDT  To: Jina Black Staff  Subject: appt                                             Pt requesting a appointment for the following a follow up virtual appointment as well as the 2 test order in February they are in the active request  ...Please call and adv @ [phone]

## 2024-08-22 RX ORDER — PREGABALIN 100 MG/1
CAPSULE ORAL
Qty: 30 CAPSULE | Refills: 3 | Status: SHIPPED | OUTPATIENT
Start: 2024-08-22

## 2024-10-08 ENCOUNTER — TELEPHONE (OUTPATIENT)
Dept: NEUROLOGY | Facility: CLINIC | Age: 79
End: 2024-10-08
Payer: MEDICARE

## 2024-10-08 NOTE — TELEPHONE ENCOUNTER
Left a message for patient informing they have been scheduled an appointment on 12/12 at 8:30am  And asked to call back to reschedule if that day/time does not work for them

## (undated) DEVICE — CORD BIPOLAR 12 FT STERILE

## (undated) DEVICE — SEE MEDLINE ITEM 146313

## (undated) DEVICE — BANDAGE DERMACEA 3IN STRETCH

## (undated) DEVICE — TOURNIQUET SB QC SP 18X4IN

## (undated) DEVICE — DRESSING N ADH OIL EMUL 3X3

## (undated) DEVICE — UNDERGLOVES BIOGEL PI SZ 7 LF

## (undated) DEVICE — SOL 9P NACL IRR PIC IL

## (undated) DEVICE — PACK UPPER EXTREMITY BAPTIST

## (undated) DEVICE — TRAY MINOR ORTHO

## (undated) DEVICE — FORCEP BIPOLAR ADSON 1MM TIP

## (undated) DEVICE — NDL SAFETY 22G X 1.5 ECLIPSE

## (undated) DEVICE — SYR B-D DISP CONTROL 10CC100/C

## (undated) DEVICE — SEE MEDLINE ITEM 157216

## (undated) DEVICE — APPLICATOR CHLORAPREP ORN 26ML

## (undated) DEVICE — NDL SPINAL 18GX3.5 SPINOCAN

## (undated) DEVICE — GAUZE SPONGE 4X4 12PLY

## (undated) DEVICE — PAD SHOULDER CARE POLAR

## (undated) DEVICE — GLOVE BIOGEL SKINSENSE PI 7.0

## (undated) DEVICE — DRAPE SURG W/TWL 17 5/8X23

## (undated) DEVICE — TAPE BANDAGE TIGER 7 IN

## (undated) DEVICE — Device

## (undated) DEVICE — SUPPORT SLING SHOT II MEDIUM

## (undated) DEVICE — DRAPE PLASTIC U 60X72

## (undated) DEVICE — FORCEP STRAIGHT DISP

## (undated) DEVICE — BANDAGE CONFORM 3IN STRL

## (undated) DEVICE — SYR 30CC LUER LOCK

## (undated) DEVICE — BLADE SURG CARBON STEEL SZ11

## (undated) DEVICE — SUT 4/0 18IN ETHILON BL P3

## (undated) DEVICE — PAD UNDERPAD 30X30

## (undated) DEVICE — PAD ABD 8X10 STERILE

## (undated) DEVICE — PROBE ARTHSCP EDGE  90 SUCTION

## (undated) DEVICE — SOL PVP-I SCRUB 7.5% 4OZ

## (undated) DEVICE — CORD BIPOLAR ELECTROSURGICAL

## (undated) DEVICE — SUT FIBERWIRE FIBER 2M

## (undated) DEVICE — SOL IRR NACL .9% 3000ML

## (undated) DEVICE — NDL SUREFIRE SCORPION RC

## (undated) DEVICE — BANDAGE ELASTIC ACE 2IN 10/CA

## (undated) DEVICE — NDL 18GA X1 1/2 REG BEVEL

## (undated) DEVICE — DRG DOC 8.0 X 85MM

## (undated) DEVICE — SEE MEDLINE ITEM 146268

## (undated) DEVICE — BANDAGE ELASTIC 2X5 VELCRO ST

## (undated) DEVICE — DRESSING N ADH OIL EMUL 3X8

## (undated) DEVICE — SOL BETADINE 5%

## (undated) DEVICE — CANNULA DRY DOC 7 X 85

## (undated) DEVICE — TUBE SET INFLOW/OUTFLOW

## (undated) DEVICE — SEE MEDLINE ITEM 152529

## (undated) DEVICE — DRAPE STERI U-SHAPED 47X51IN

## (undated) DEVICE — SEE MEDLINE ITEM 157160

## (undated) DEVICE — TOURNIQUET SB QC DP 18X4IN